# Patient Record
Sex: FEMALE | Race: WHITE | NOT HISPANIC OR LATINO | Employment: OTHER | URBAN - NONMETROPOLITAN AREA
[De-identification: names, ages, dates, MRNs, and addresses within clinical notes are randomized per-mention and may not be internally consistent; named-entity substitution may affect disease eponyms.]

---

## 2017-03-03 ENCOUNTER — COMMUNICATION - GICH (OUTPATIENT)
Dept: FAMILY MEDICINE | Facility: OTHER | Age: 57
End: 2017-03-03

## 2017-03-03 DIAGNOSIS — K21.9 GASTRO-ESOPHAGEAL REFLUX DISEASE WITHOUT ESOPHAGITIS: ICD-10-CM

## 2017-03-04 ENCOUNTER — COMMUNICATION - GICH (OUTPATIENT)
Dept: FAMILY MEDICINE | Facility: OTHER | Age: 57
End: 2017-03-04

## 2017-03-04 DIAGNOSIS — K21.9 GASTRO-ESOPHAGEAL REFLUX DISEASE WITHOUT ESOPHAGITIS: ICD-10-CM

## 2017-03-10 ENCOUNTER — COMMUNICATION - GICH (OUTPATIENT)
Dept: FAMILY MEDICINE | Facility: OTHER | Age: 57
End: 2017-03-10

## 2017-04-09 ENCOUNTER — COMMUNICATION - GICH (OUTPATIENT)
Dept: FAMILY MEDICINE | Facility: OTHER | Age: 57
End: 2017-04-09

## 2017-04-09 DIAGNOSIS — K21.9 GASTRO-ESOPHAGEAL REFLUX DISEASE WITHOUT ESOPHAGITIS: ICD-10-CM

## 2017-04-11 ENCOUNTER — COMMUNICATION - GICH (OUTPATIENT)
Dept: FAMILY MEDICINE | Facility: OTHER | Age: 57
End: 2017-04-11

## 2017-04-12 ENCOUNTER — AMBULATORY - GICH (OUTPATIENT)
Dept: LAB | Facility: OTHER | Age: 57
End: 2017-04-12

## 2017-04-12 DIAGNOSIS — R94.5 ABNORMAL RESULTS OF LIVER FUNCTION STUDIES: ICD-10-CM

## 2017-04-12 LAB
ALT (SGPT) - HISTORICAL: 70 IU/L (ref 7–52)
AST SERPL-CCNC: 49 IU/L (ref 13–39)

## 2017-04-29 ENCOUNTER — COMMUNICATION - GICH (OUTPATIENT)
Dept: FAMILY MEDICINE | Facility: OTHER | Age: 57
End: 2017-04-29

## 2017-04-29 DIAGNOSIS — E78.00 PURE HYPERCHOLESTEROLEMIA: ICD-10-CM

## 2017-07-31 ENCOUNTER — COMMUNICATION - GICH (OUTPATIENT)
Dept: FAMILY MEDICINE | Facility: OTHER | Age: 57
End: 2017-07-31

## 2017-07-31 DIAGNOSIS — E78.00 PURE HYPERCHOLESTEROLEMIA: ICD-10-CM

## 2017-10-01 ENCOUNTER — COMMUNICATION - GICH (OUTPATIENT)
Dept: FAMILY MEDICINE | Facility: OTHER | Age: 57
End: 2017-10-01

## 2017-10-01 DIAGNOSIS — K21.9 GASTRO-ESOPHAGEAL REFLUX DISEASE WITHOUT ESOPHAGITIS: ICD-10-CM

## 2017-10-04 ENCOUNTER — COMMUNICATION - GICH (OUTPATIENT)
Dept: FAMILY MEDICINE | Facility: OTHER | Age: 57
End: 2017-10-04

## 2017-10-04 DIAGNOSIS — I10 ESSENTIAL (PRIMARY) HYPERTENSION: ICD-10-CM

## 2017-10-10 ENCOUNTER — HISTORY (OUTPATIENT)
Dept: FAMILY MEDICINE | Facility: OTHER | Age: 57
End: 2017-10-10

## 2017-10-10 ENCOUNTER — COMMUNICATION - GICH (OUTPATIENT)
Dept: FAMILY MEDICINE | Facility: OTHER | Age: 57
End: 2017-10-10

## 2017-10-10 ENCOUNTER — OFFICE VISIT - GICH (OUTPATIENT)
Dept: FAMILY MEDICINE | Facility: OTHER | Age: 57
End: 2017-10-10

## 2017-10-10 DIAGNOSIS — R79.89 OTHER SPECIFIED ABNORMAL FINDINGS OF BLOOD CHEMISTRY: ICD-10-CM

## 2017-10-10 DIAGNOSIS — L92.0 GRANULOMA ANNULARE: ICD-10-CM

## 2017-10-10 DIAGNOSIS — J45.901 ASTHMA WITH ACUTE EXACERBATION: ICD-10-CM

## 2017-10-10 DIAGNOSIS — E78.00 PURE HYPERCHOLESTEROLEMIA: ICD-10-CM

## 2017-10-10 DIAGNOSIS — J45.20 MILD INTERMITTENT ASTHMA, UNCOMPLICATED: ICD-10-CM

## 2017-10-10 DIAGNOSIS — R79.0 ABNORMAL LEVEL OF BLOOD MINERAL: ICD-10-CM

## 2017-10-10 DIAGNOSIS — Z28.21 IMMUNIZATION NOT CARRIED OUT BECAUSE OF PATIENT REFUSAL: ICD-10-CM

## 2017-10-10 DIAGNOSIS — K21.9 GASTRO-ESOPHAGEAL REFLUX DISEASE WITHOUT ESOPHAGITIS: ICD-10-CM

## 2017-10-10 DIAGNOSIS — I10 ESSENTIAL (PRIMARY) HYPERTENSION: ICD-10-CM

## 2017-10-10 LAB
A/G RATIO - HISTORICAL: 2 (ref 1–2)
ALBUMIN SERPL-MCNC: 4.6 G/DL (ref 3.5–5.7)
ALP SERPL-CCNC: 97 IU/L (ref 34–104)
ALT (SGPT) - HISTORICAL: 83 IU/L (ref 7–52)
ANION GAP - HISTORICAL: 8 (ref 5–18)
AST SERPL-CCNC: 54 IU/L (ref 13–39)
BILIRUB SERPL-MCNC: 1 MG/DL (ref 0.3–1)
BUN SERPL-MCNC: 16 MG/DL (ref 7–25)
BUN/CREAT RATIO - HISTORICAL: 24
CALCIUM SERPL-MCNC: 9.7 MG/DL (ref 8.6–10.3)
CHLORIDE SERPLBLD-SCNC: 105 MMOL/L (ref 98–107)
CHOL/HDL RATIO - HISTORICAL: 2.96
CHOLESTEROL TOTAL: 201 MG/DL
CO2 SERPL-SCNC: 23 MMOL/L (ref 21–31)
CREAT SERPL-MCNC: 0.68 MG/DL (ref 0.7–1.3)
GFR IF NOT AFRICAN AMERICAN - HISTORICAL: >60 ML/MIN/1.73M2
GLOBULIN - HISTORICAL: 2.3 G/DL (ref 2–3.7)
GLUCOSE SERPL-MCNC: 104 MG/DL (ref 70–105)
HDLC SERPL-MCNC: 68 MG/DL (ref 23–92)
LDLC SERPL CALC-MCNC: 110 MG/DL
MAGNESIUM SERPL-MCNC: 1.8 MG/DL (ref 1.9–2.7)
NON-HDL CHOLESTEROL - HISTORICAL: 133 MG/DL
POTASSIUM SERPL-SCNC: 4 MMOL/L (ref 3.5–5.1)
PROT SERPL-MCNC: 6.9 G/DL (ref 6.4–8.9)
PROVIDER ORDERDED STATUS - HISTORICAL: ABNORMAL
SODIUM SERPL-SCNC: 136 MMOL/L (ref 133–143)
TRIGL SERPL-MCNC: 117 MG/DL

## 2017-10-17 ENCOUNTER — OFFICE VISIT - GICH (OUTPATIENT)
Dept: OTOLARYNGOLOGY | Facility: OTHER | Age: 57
End: 2017-10-17

## 2017-10-17 DIAGNOSIS — Z00.00 ENCOUNTER FOR GENERAL ADULT MEDICAL EXAMINATION WITHOUT ABNORMAL FINDINGS: ICD-10-CM

## 2017-11-02 ENCOUNTER — COMMUNICATION - GICH (OUTPATIENT)
Dept: FAMILY MEDICINE | Facility: OTHER | Age: 57
End: 2017-11-02

## 2017-11-02 DIAGNOSIS — R79.0 ABNORMAL LEVEL OF BLOOD MINERAL: ICD-10-CM

## 2017-12-27 NOTE — PROGRESS NOTES
Patient Information     Patient Name MRN Charleen Majano [id number] Female 1960      Progress Notes by Marian Loza at 10/17/2017  1:00 PM     Author:  Marian Loza Service:  (none) Author Type:  (none)     Filed:  10/24/2017  2:28 PM Encounter Date:  10/17/2017 Status:  Signed     :  Marian Loza            See scanned document.

## 2017-12-28 NOTE — TELEPHONE ENCOUNTER
Patient Information     Patient Name MRN Sex Charleen Arora [id number] Female 1960      Telephone Encounter by Marilin Velásquez RN at 2017  1:07 PM     Author:  Marilin Velásquez RN Service:  (none) Author Type:  NURS- Registered Nurse     Filed:  2017  1:15 PM Encounter Date:  2017 Status:  Signed     :  Marilin Velásquez RN (NURS- Registered Nurse)            This is a Refill request [facility]: [facility]   Name of Medication: Magnesium Oxide  Quantity requested: 90  Last fill date: 10/10/2017  Due for refill: yes  Last visit with TAYLER SEO was on: 10/10/2017 in [facility]  PCP:  Tayler Seo MD  Controlled Substance Agreement:  n/a   Diagnosis r/t this medication request: low magnesium level     MAGNESIUM      Date Value Ref Range Status   10/10/2017 1.8 (L) 1.9 - 2.7 mg/dL Final       Unable to complete prescription refill per RN Medication Refill Policy.................... Marilin Velásquez RN ....................  2017   1:07 PM

## 2017-12-28 NOTE — TELEPHONE ENCOUNTER
Patient Information     Patient Name MRN Charleen Majano [id number] Female 1960      Telephone Encounter by Elisa Perez RN at 2017  2:10 PM     Author:  Elisa Perez RN Service:  (none) Author Type:  NURS- Registered Nurse     Filed:  2017  2:16 PM Encounter Date:  2017 Status:  Signed     :  Elisa Perez RN (NURS- Registered Nurse)            Statins    Office visit in the past 12 months.    Last visit with TAYLER PAZ was on: 2016 in [facility]  Next visit with TAYLER PAZ is on: No future appointment listed with this provider  Next visit with Family Practice is on: No future appointment listed in this department    Lab testing requirements:  Lipids annually.  Repeat lipids 6-8 weeks after dosage or drug change.    Last Lipids:  Chol: 194    2016  T    2016  HDL:   64    2016  LDL:  112    2016  LDL DIRECT:  No results found in past 5 years    .    Concommitant use of fibrates and statins-If it is an addition to the medication list, review note and/or discuss with provider.  If already on medication list, refill.    Max refills 12 months from last office visit.      Patient is due for medication management appointment. Limited refill provided at this time. [facility] message and/or letter sent for reminder to patient. Prescription refilled per RN Medication Refill Policy.................... Elisa Perez RN ....................  2017   2:15 PM

## 2017-12-28 NOTE — PATIENT INSTRUCTIONS
Patient Information     Patient Name MRN Sex Charleen Arora [id number] Female 1960      Patient Instructions by Evelina Seo MD at 10/10/2017 11:25 AM     Author:  Evelina Seo MD  Service:  (none) Author Type:  Physician     Filed:  10/10/2017  6:21 PM  Encounter Date:  10/10/2017 Status:  Addendum     :  Evelina Seo MD (Physician)        Related Notes: Original Note by Evelina Seo MD (Physician) filed at 10/10/2017  6:20 PM            Car Safety tips:   Wear your seatbelt at all times.   Do not drive when tired.  If drinking alcohol, find a designated .  Do NOT EVER text and drive!   Consider DRIVE MODE carolyn for your phone.     Patient refused mammogram    Labs will be placed in your [employer] account  Work on following  a mediterranean diet; Use monosaturated fats ( olive , canola and peanut   oil; nuts, avocados), abundance of plant foods which are minimally processed, fish (salmon).  Exercise 30 minutes every day     Try to get 7-8 hours sleep each night.  Rest is important!    lipitor ; hold for 2 weeks   Referral placed to Dr. Bowman for hepatic steatosis and use of statin     Reviewed with patient problems with long term use of PPI including but not limited to change of microbia of gut sadaf due to acid changes, interstitial nephritis, decrease calcium and magnesium absorption.   Black box warning for individuals with Osteoporosis;  Omeprazole not refilled.  Trial zantac 300 mg a day

## 2017-12-28 NOTE — TELEPHONE ENCOUNTER
Patient Information     Patient Name MRN Charleen Majano [id number] Female 1960      Telephone Encounter by Elisa Perez RN at 10/3/2017  4:08 PM     Author:  Elisa Perez RN Service:  (none) Author Type:  NURS- Registered Nurse     Filed:  10/3/2017  4:19 PM Encounter Date:  10/1/2017 Status:  Signed     :  Elisa Perez RN (NURS- Registered Nurse)            Proton Pump Inhibitors    Office visit in the past 12 months or per provider note.    Last visit with TAYLER PAZ was on: 2016 in [facility]  Next visit with TAYLER PAZ is on: 10/10/2017 in [facility]  Next visit with Family Practice is on: 10/10/2017 in [facility]    Max refill for 12 months from last office visit or per provider note.    Patient is due for medication management and lab appointment. Limited refill provided at this time. [employer] message and/or letter sent for reminder to patient. Prescription refilled per RN Medication Refill Policy.................... Elisa Perez RN ....................  10/3/2017   4:15 PM

## 2017-12-28 NOTE — PROGRESS NOTES
Patient Information     Patient Name MRN Sex Charleen Arora [id number] Female 1960      Progress Notes by Evelina Seo MD at 10/10/2017 10:15 AM     Author:  Evelina Seo MD Service:  (none) Author Type:  Physician     Filed:  10/10/2017  6:24 PM Encounter Date:  10/10/2017 Status:  Signed     :  Evelina Seo MD (Physician)            Nursing Notes:   Yanet Virgen  10/10/2017 10:40 AM  Signed  Patient presents to the clinic to get her medications refilled.  She knows she's due for a mammogram and PAP but declines at this time, they don't have good insurance so she would have to pay a large amount out of pocket, she will have them done as soon as things turn around for her.    She declines a flu shot at this time as well.    Yanet Virgen LPN....................10/10/2017 10:39 AM        Subjective:  Charleen Huddleston is a 57 y.o. female who presents fo medication refill     High cholesterol   patient is here for follow-up of her cholesterol. She is on Lipitor 20 mg a day. She has history of elevated liver enzyme. Denies any chest pain or palpitations         HTN (hypertension)   Patient appears well, alert and oriented x 3, pleasant, cooperative.  MALLORIE. TM's clear, Oral pharynx with good dentition, without lesion, erythema or exudate. Moist mucous membranes. Neck supple and free of adenopathy, or masses. No thyromegaly. Lungs are clear, without wheezes, rhonchi or rales. Heart sounds are normal, no murmurs, clicks, gallops or rubs. Abdomen is soft, no tenderness, masses or organomegaly. No CVAT.  Extremities are without edema. Peripheral pulses are normal. Screening neurological exam is normal without focal findings. Skin is normal without suspicious lesions noted.    Elevated liver enzymes  patient has had elevated liver enzymes which prompted ultrasound last year which suggested hepatic steatosis as noted below.    COMPARISON: None.       FINDINGS:     LIVER: There is increased echogenicity to the liver parenchyma, suggesting hepatic steatosis. There is no biliary ductal dilatation.      GALLBLADDER: The gallbladder is unremarkable, without cholelithiasis, wall thickening or pericholecystic fluid.      COMMON BILE DUCT: 2 mm     PANCREAS: The visualized portions of the pancreas are unremarkable.     KIDNEYS: The right kidney is normal in echotexture without hydronephrosis.     ABDOMINAL AORTA AND IVC: Visualized portions of the IVC and abdominal aorta are unremarkable. Note that the distal abdominal aorta was obscured by bowel gas.        IMPRESSION: Hepatic steatosis.     Electronically Signed By: Dorothea Jones M.D. on 10/20/2016 10:19 AM          Low magnesium level     patient has had a history of low magnesium on a proton pump inhibitor. Denies any muscle aches or cramping     Gastroesophageal reflux disease without esophagitis   patient with a history of reflux disease she is on a proton pump inhibitor would like refill. While on PPI she is not having symptoms     Reactive airway disease with acute exacerbation, unspecified asthma severity, unspecified whether persistent  patient needs refill of her Advair and her Ventolin;  She refuses flu shot        Problem List/PMH: reviewed in EMR  No Known Allergies  Current Outpatient Prescriptions on File Prior to Visit       Medication  Sig Dispense Refill     atorvastatin (LIPITOR) 20 mg tablet TAKE ONE TABLET BY MOUTH NIGHTLY AT BEDTIME 90 tablet 0     omeprazole (PRILOSEC) 20 mg Delayed-Release capsule TAKE 1 CAPSULE BY MOUTH ONCE DAILY BEFORE A MEAL. 90 capsule 0     No current facility-administered medications on file prior to visit.        Social Hx:  Social History        Substance Use Topics          Smoking status:   Current Every Day Smoker      Packs/day:  1.00      Years:  25.00      Types:  Cigarettes      Last attempt to quit:  10/1/2012      Smokeless tobacco:   Never Used        "Comment: requesting chantex       Alcohol use   1.8 oz/week     3 Standard drinks or equivalent per week       Social History Narrative    .  Works at the school [employer].   works at [email or web address].      She has 3 grown children.    L[place] on [address]. Quit smoking 9/2012        **pre upload 02/11/2013**                                Family Hx:   Family History       Problem   Relation Age of Onset     Hyperlipidemia  Mother      Hyperlipidemia       Stroke  Mother      CVA 03/07       Heart Disease  Mother      aortic and mitral valve replacements, rheumatic fever       Diabetes  Father      Heart Disease  Sister      Valvular heart disease and diffuse athersclerosis on brain MRI       Other  Sister      rheumatic fever       Stroke  Maternal Grandmother      CVA       Cancer-prostate  Paternal Grandfather        Objective:  /84  Ht 1.575 m (5' 2\")  Wt 85 kg (187 lb 6.4 oz)  Breastfeeding? No  BMI 34.28 kg/m2   Patient appears well, alert and oriented x 3, pleasant, cooperative.  MALLORIE. TM's clear, Oral pharynx with good dentition, without lesion, erythema or exudate. Moist mucous membranes. Neck supple and free of adenopathy, or masses. No thyromegaly. Lungs are clear, without wheezes, rhonchi or rales. Heart sounds are normal, no murmurs, clicks, gallops or rubs. Abdomen is soft, obese, no tenderness, masses or organomegaly. No CVAT.  Extremities are without edema. Peripheral pulses are normal. Screening neurological exam is normal without focal findings. Skin is normal without suspicious lesions noted.        Results for orders placed or performed in visit on 10/10/17      LIPID PANEL      Result  Value Ref Range    CHOLESTEROL,TOTAL 201 (H) <200 mg/dL    TRIGLYCERIDES 117 <150 mg/dL    HDL CHOLESTEROL 68 23 - 92 mg/dL    NON-HDL CHOLESTEROL 133 <145 mg/dl    CHOL/HDL RATIO            2.96 <4.50                    LDL CHOLESTEROL 110 (H) <100 mg/dL    PROVIDER ORDERED STATUS RANDOM    COMP " METABOLIC PANEL      Result  Value Ref Range    SODIUM 136 133 - 143 mmol/L    POTASSIUM 4.0 3.5 - 5.1 mmol/L    CHLORIDE 105 98 - 107 mmol/L    CO2,TOTAL 23 21 - 31 mmol/L    ANION GAP 8 5 - 18                    GLUCOSE 104 70 - 105 mg/dL    CALCIUM 9.7 8.6 - 10.3 mg/dL    BUN 16 7 - 25 mg/dL    CREATININE 0.68 (L) 0.70 - 1.30 mg/dL    BUN/CREAT RATIO           24                    GFR if African American >60 >60 ml/min/1.73m2    GFR if not African American >60 >60 ml/min/1.73m2    ALBUMIN 4.6 3.5 - 5.7 g/dL    PROTEIN,TOTAL 6.9 6.4 - 8.9 g/dL    GLOBULIN                  2.3 2.0 - 3.7 g/dL    A/G RATIO 2.0 1.0 - 2.0                    BILIRUBIN,TOTAL 1.0 0.3 - 1.0 mg/dL    ALK PHOSPHATASE 97 34 - 104 IU/L    ALT (SGPT) 83 (H) 7 - 52 IU/L    AST (SGOT) 54 (H) 13 - 39 IU/L   MAGNESIUM      Result  Value Ref Range    MAGNESIUM 1.8 (L) 1.9 - 2.7 mg/dL         Assessment:    ICD-10-CM    1. High cholesterol E78.00 LIPID PANEL      COMP METABOLIC PANEL      LIPID PANEL      COMP METABOLIC PANEL   2. HTN (hypertension) I10 COMP METABOLIC PANEL      lisinopril-hydrochlorothiazide (10-12.5 mg) tablet (PRINZIDE; ZESTORETIC)      COMP METABOLIC PANEL      TSH   3. Elevated LFTs R79.89 AMB CONSULT TO INTERNAL MEDICINE   4. Low magnesium level R79.0 MAGNESIUM      MAGNESIUM      magnesium oxide 400 mg capsule   5. Gastroesophageal reflux disease without esophagitis K21.9 ranitidine (ZANTAC) 300 mg tablet   6. Reactive airway disease with acute exacerbation, unspecified asthma severity, unspecified whether persistent J45.901 fluticasone-salmeterol (ADVAIR DISKUS) 250-50 mcg/Dose diskus inhaler   7. Reactive airway disease, mild intermittent, uncomplicated J45.20 albuterol HFA (VENTOLIN HFA) 90 mcg/actuation inhaler   8. Granuloma annulare L92.0 TSH   9. Refused influenza vaccine Z28.21         Ms. Huddleston's Body mass index is 34.28 kg/(m^2). This is out of the normal range for a 57 y.o. Normal range for ages 18+ is between  18.5 and 24.9. To lose weight we reviewed risks and benefits of appropriate options such as diet, exercise, and medications. Patient's strategy will be  self-directed nutrition plan and self-directed exercise program  Referral to Dr. Bowman for persistent LFT/ hepatic steatosis  Encouraged flu shot, patient declined.  She declined breast/ pelvic exam.   Plan:   -- Expected clinical course discussed   -- Medications and their side effects discussed  Patient Instructions   Car Safety tips:   Wear your seatbelt at all times.   Do not drive when tired.  If drinking alcohol, find a designated .  Do NOTEVER text and drive!   Consider DRIVE MODE carolyn for your phone.     Patient refused mammogram    Labs will be placed in your [employer] account  Work on following  a mediterranean diet; Use monosaturated fats ( olive , canola and peanut   oil; nuts, avocados), abundance of plant foods which are minimally processed, fish (salmon).  Exercise 30 minutes every day     Try to get 7-8 hours sleep each night.  Rest is important!    lipitor ; hold for 2 weeks   Referral placed to Dr. Bowman for hepatic steatosis and use of statin     Reviewed with patient problems with long term use of PPI including but not limited to change of microbia of gut sadaf due to acid changes, interstitial nephritis, decrease calcium and magnesium absorption.   Black box warning for individuals with Osteoporosis;  Omeprazole not refilled.  Trial zantac 300 mg a day            Electronically signed by Evelina Seo MD

## 2017-12-28 NOTE — TELEPHONE ENCOUNTER
Patient Information     Patient Name MRN Charleen Majano [id number] Female 1960      Telephone Encounter by Elisa Perez RN at 10/5/2017  4:36 PM     Author:  Elisa Perez RN Service:  (none) Author Type:  NURS- Registered Nurse     Filed:  10/5/2017  4:37 PM Encounter Date:  10/4/2017 Status:  Signed     :  Elisa Perez RN (NURS- Registered Nurse)            Diuretic Combinations    Office visit in the past 12 months or per provider note.    Last visit with TAYLER PAZ was on: 2016 in [facility]  Next visit with TAYLER PAZ is on: 10/10/2017 in [facility]  Next visit with Family Practice is on: 10/10/2017 in [facility]    Lab test requirements:  Creatinine and Potassium annually, if ordering lab, order BMP.  CREATININE (mg/dL)    Date Value   2016 0.75     POTASSIUM (mmol/L)    Date Value   2016 4.3       Max refill for 12 months from last office visit or per provider note.  BP Readings from Last 4 Encounters:    16 138/80   16 138/76   16 138/80   08/20/15 140/78         Prescription refilled per RN Medication Refill Policy.................... Elisa Perez RN ....................  10/5/2017   4:36 PM

## 2017-12-28 NOTE — TELEPHONE ENCOUNTER
Patient Information     Patient Name MRN Charleen Majano [id number] Female 1960      Telephone Encounter by Elisa Perez RN at 10/11/2017  4:09 PM     Author:  Elisa Perez RN Service:  (none) Author Type:  NURS- Registered Nurse     Filed:  10/11/2017  4:11 PM Encounter Date:  10/10/2017 Status:  Signed     :  Elisa Perez RN (NURS- Registered Nurse)            Statins    Office visit in the past 12 months.    Last visit with TAYLER PAZ was on: 10/10/2017 in [facility]  Next visit with TAYLER PAZ is on: No future appointment listed with this provider  Next visit with Family Practice is on: No future appointment listed in this department    Lab testing requirements:  Lipids annually.  Repeat lipids 6-8 weeks after dosage or drug change.    Last Lipids:  Chol: 201    10/10/2017  T    10/10/2017  HDL:   68    10/10/2017  LDL:  110    10/10/2017  LDL DIRECT:  No results found in past 5 years    .    Concommitant use of fibrates and statins-If it is an addition to the medication list, review note and/or discuss with provider.  If already on medication list, refill.    Max refills 12 months from last office visit.      Prescription refilled per RN Medication Refill Policy.................... Elias Perez RN ....................  10/11/2017   4:09 PM

## 2017-12-30 NOTE — NURSING NOTE
Patient Information     Patient Name MRCharleen Dahl [id number] Female 1960      Nursing Note by Yanet Virgen at 10/10/2017 10:15 AM     Author:  Yanet Virgen Service:  (none) Author Type:  (none)     Filed:  10/10/2017 10:40 AM Encounter Date:  10/10/2017 Status:  Signed     :  Yanet Virgen            Patient presents to the clinic to get her medications refilled.  She knows she's due for a mammogram and PAP but declines at this time, they don't have good insurance so she would have to pay a large amount out of pocket, she will have them done as soon as things turn around for her.    She declines a flu shot at this time as well.    Yanet Virgen LPN....................10/10/2017 10:39 AM

## 2018-01-03 NOTE — TELEPHONE ENCOUNTER
Patient Information     Patient Name MRN Charleen Majano [id number] Female 1960      Telephone Encounter by Princess Gallo at 3/10/2017  4:04 PM     Author:  Princess Gallo Service:  (none) Author Type:  (none)     Filed:  3/10/2017  4:12 PM Encounter Date:  3/10/2017 Status:  Signed     :  Princess Gallo            Patient was calling in regards to her omeprazole. Explained she only received a 30 day supply due to needing to be seen. She states she will not come in until august.  Princess Gallo LPN.......................... 3/10/2017  4:12 PM

## 2018-01-03 NOTE — TELEPHONE ENCOUNTER
Patient Information     Patient Name MRN Charleen Majano [id number] Female 1960      Telephone Encounter by Angelika Desai RN at 3/3/2017  8:36 AM     Author:  Angelika Desai RN Service:  (none) Author Type:  (none)     Filed:  3/3/2017  8:43 AM Encounter Date:  3/3/2017 Status:  Signed     :  Angelika Desai RN (NURS- Registered Nurse)            Proton Pump Inhibitors    Office visit in the past 12 months or per provider note.    Last visit with TAYLER PAZ was on: 2016 in [facility]  Next visit with TAYLER PAZ is on: No future appointment listed with this provider  Next visit with Family Practice is on: No future appointment listed in this department    Max refill for 12 months from last office visit or per provider note.    Patient is due for medication management appointment. Limited refill provided at this time and letter sent for reminder to patient. Prescription refilled per RN Medication Refill Policy.................... Angelika Desai ....................  3/3/2017   8:43 AM

## 2018-01-03 NOTE — TELEPHONE ENCOUNTER
Patient Information     Patient Name MRN Charleen Majano [id number] Female 1960      Telephone Encounter by Angelika Desai RN at 3/6/2017 10:18 AM     Author:  Angelika Desai RN Service:  (none) Author Type:  (none)     Filed:  3/6/2017 10:20 AM Encounter Date:  3/4/2017 Status:  Signed     :  Angelika Desai RN (NURS- Registered Nurse)            Proton Pump Inhibitors    Office visit in the past 12 months or per provider note.    Last visit with TAYLER PAZ was on: 2016 in [facility]  Next visit with TAYLER PAZ is on: No future appointment listed with this provider  Next visit with Family Practice is on: No future appointment listed in this department    Max refill for 12 months from last office visit or per provider note.    Prescription was filled for a limited quantity and patient advised to be seen for further refills on 3/3/2017.    Unable to complete prescription refill per RN Medication Refill Policy.................... Angelika Desai ....................  3/6/2017   10:20 AM

## 2018-01-04 NOTE — TELEPHONE ENCOUNTER
Patient Information     Patient Name MRN Charleen Majano [id number] Female 1960      Telephone Encounter by Kelli Bahena at 2017  8:46 AM     Author:  Kelli Bahena Service:  (none) Author Type:  (none)     Filed:  2017  8:53 AM Encounter Date:  2017 Status:  Signed     :  Kelli Bahena            FYI:     Spoke with patient and she was told by our Triage/ RN nurse that she only needed to have a lab only to have her Liver Enzymes rechecked.    Current orders have . I did extend these if this is a problem just let me know.      Note from RN on previous Phone note.     Marilin Velásquez, RN       4:03 PM   Note      Patient returned call. She agreed to make a lab only appointment and was transferred to scheduling.  Marilin Velásquez RN........4/10/2017 4:03 PM           Kelli Bahena LPN........................2017  8:50 AM

## 2018-01-04 NOTE — TELEPHONE ENCOUNTER
Patient Information     Patient Name MRN Charleen Majano [id number] Female 1960      Telephone Encounter by Marilin Velásquez RN at 2017 10:18 AM     Author:  Marilin Velásquez RN Service:  (none) Author Type:  NURS- Registered Nurse     Filed:  2017 10:19 AM Encounter Date:  2017 Status:  Signed     :  Marilin Velásquez RN (NURS- Registered Nurse)            Statins    Office visit in the past 12 months.    Last visit with TAYLER PAZ was on: 2016 in [facility]  Next visit with TAYLER PAZ is on: No future appointment listed with this provider  Next visit with Family Practice is on: No future appointment listed in this department    Lab testing requirements:  Lipids annually.  Repeat lipids 6-8 weeks after dosage or drug change.    Last Lipids:  Chol: 194    2016  T    2016  HDL:   64    2016  LDL:  112    2016  LDL DIRECT:  No results found in past 5 years    .    Concommitant use of fibrates and statins-If it is an addition to the medication list, review note and/or discuss with provider.  If already on medication list, refill.    Max refills 12 months from last office visit.      Due for medication management in August. Filled until that time.    Marilin Velásquez RN........2017 10:19 AM

## 2018-01-04 NOTE — TELEPHONE ENCOUNTER
Patient Information     Patient Name MRN Charleen Majano [id number] Female 1960      Telephone Encounter by Marilin Velásquez RN at 4/10/2017  4:03 PM     Author:  Marilin Velásquez RN Service:  (none) Author Type:  NURS- Registered Nurse     Filed:  4/10/2017  4:03 PM Encounter Date:  2017 Status:  Signed     :  Marilin Velásquez RN (NURS- Registered Nurse)            Patient returned call. She agreed to make a lab only appointment and was transferred to scheduling.  Marilin Velásquez RN........4/10/2017 4:03 PM

## 2018-01-04 NOTE — TELEPHONE ENCOUNTER
Patient Information     Patient Name MRN Charleen Majano [id number] Female 1960      Telephone Encounter by Angelika Desai RN at 4/10/2017 10:39 AM     Author:  Angelika Desai RN Service:  (none) Author Type:  (none)     Filed:  4/10/2017 10:44 AM Encounter Date:  2017 Status:  Signed     :  Angelika Desai RN (NURS- Registered Nurse)            Proton Pump Inhibitors    Office visit in the past 12 months or per provider note.    [facility]  Next visit with TAYLER PAZ is on: No future appointment listed with this provider  Next visit with Family Practice is on: No future appointment listed in this department    Max refill for 12 months from last office visit or per provider note.    Patient is due for labs per last office visit (elevated LFTs). Refills will be provided as the medication is not related to these labs.    Attempted to reach patient, unable. Left message to call back.    Prescription refilled per RN Medication Refill Policy.................... Angelika Desai ....................  4/10/2017   10:43 AM

## 2018-01-04 NOTE — TELEPHONE ENCOUNTER
Patient Information     Patient Name MRN Sex Charleen Arora [id number] Female 1960      Telephone Encounter by Eboni Chino at 2017  7:29 AM     Author:  Eboni Chino Service:  (none) Author Type:  (none)     Filed:  2017  7:30 AM Encounter Date:  2017 Status:  Signed     :  Eboni Chino            Pt coming  @ [phone] for lab work. Please place orders. Thanks lab

## 2018-01-24 ENCOUNTER — DOCUMENTATION ONLY (OUTPATIENT)
Dept: FAMILY MEDICINE | Facility: OTHER | Age: 58
End: 2018-01-24

## 2018-01-24 PROBLEM — E66.9 OBESITY: Status: ACTIVE | Noted: 2018-01-24

## 2018-01-24 RX ORDER — ALBUTEROL SULFATE 90 UG/1
1-2 AEROSOL, METERED RESPIRATORY (INHALATION) EVERY 4 HOURS PRN
COMMUNITY
Start: 2017-10-10 | End: 2018-08-24

## 2018-01-24 RX ORDER — CALCIUM CARBONATE/VITAMIN D3 500-10/5ML
400 LIQUID (ML) ORAL DAILY
COMMUNITY
Start: 2017-11-02 | End: 2018-08-24

## 2018-01-24 RX ORDER — ATORVASTATIN CALCIUM 20 MG/1
20 TABLET, FILM COATED ORAL AT BEDTIME
COMMUNITY
Start: 2017-10-11 | End: 2018-08-24

## 2018-01-24 RX ORDER — LISINOPRIL/HYDROCHLOROTHIAZIDE 10-12.5 MG
1 TABLET ORAL DAILY
COMMUNITY
Start: 2017-10-10 | End: 2018-08-24

## 2018-01-26 VITALS
SYSTOLIC BLOOD PRESSURE: 128 MMHG | WEIGHT: 187.4 LBS | BODY MASS INDEX: 34.48 KG/M2 | DIASTOLIC BLOOD PRESSURE: 84 MMHG | HEIGHT: 62 IN

## 2018-02-02 ENCOUNTER — COMMUNICATION - GICH (OUTPATIENT)
Dept: FAMILY MEDICINE | Facility: OTHER | Age: 58
End: 2018-02-02

## 2018-02-02 DIAGNOSIS — R79.0 ABNORMAL LEVEL OF BLOOD MINERAL: ICD-10-CM

## 2018-02-03 ENCOUNTER — COMMUNICATION - GICH (OUTPATIENT)
Dept: FAMILY MEDICINE | Facility: OTHER | Age: 58
End: 2018-02-03

## 2018-02-03 DIAGNOSIS — K21.9 GASTRO-ESOPHAGEAL REFLUX DISEASE WITHOUT ESOPHAGITIS: ICD-10-CM

## 2018-02-13 NOTE — TELEPHONE ENCOUNTER
Patient Information     Patient Name MRN Charleen Majano [id number] Female 1960      Telephone Encounter by Isabel Cervantes RN at 2018 11:00 AM     Author:  Isabel Cervantes RN Service:  (none) Author Type:  NURS- Registered Nurse     Filed:  2018 11:02 AM Encounter Date:  2018 Status:  Signed     :  Isabel Cervantes RN (NURS- Registered Nurse)            [facility]  Name of Medication: magnesium oxide (MAG-) 400 mg tablet  TAKE 1 TABLET BY MOUTH ONCE DAILY.  Quantity requested: 90  Last fill date: 17  Due for refill: yes  Last visit with EVELINA SEO was on: 10/10/2017 in [facility]  PCP:  Evelina Seo MD  Controlled Substance Agreement:  na   Diagnosis r/t this medication request: low magnesium  Last med review appt was 10/10/17, refill appropriate, Mg was 1.8 on 10/10/17     Unable to complete prescription refill per RN Medication Refill Policy.................... ISABEL CERVANTES RN ....................  2018   11:00 AM

## 2018-02-13 NOTE — TELEPHONE ENCOUNTER
Patient Information     Patient Name MRN Charleen Majano [id number] Female 1960      Telephone Encounter by Isabel Cervantes RN at 2018 12:34 PM     Author:  Isabel Cervantes RN Service:  (none) Author Type:  NURS- Registered Nurse     Filed:  2018 12:36 PM Encounter Date:  2/3/2018 Status:  Signed     :  Isabel Cervantes RN (NURS- Registered Nurse)            This is a [facility]  Name of Medication: omeprazole (PRILOSEC) 20 mg Delayed-Release capsule  TAKE 1 CAPSULE BY MOUTH ONCE DAILY BEFORE A MEAL.  Quantity requested: 90  Last fill date: 10/3/17  Due for refill: unsure  Last visit with EVELINA SEO was on: 10/10/2017 in [facility]  PCP:  Evelina Seo MD  Controlled Substance Agreement:  na   Diagnosis r/t this medication request: GERD    Patient was seen for med review on 10/10/17 and Prilosec was not refilled and was given #90 day trial of Zantac instead to Evelina Seo MD to address refill     Unable to complete prescription refill per RN Medication Refill Policy.................... ISABEL CERVANTES RN ....................  2018   12:34 PM

## 2018-03-25 ENCOUNTER — HEALTH MAINTENANCE LETTER (OUTPATIENT)
Age: 58
End: 2018-03-25

## 2018-05-07 DIAGNOSIS — K21.9 GASTROESOPHAGEAL REFLUX DISEASE WITHOUT ESOPHAGITIS: Primary | ICD-10-CM

## 2018-05-10 NOTE — TELEPHONE ENCOUNTER
Prescription approved per [facility] Refill Protocol.  Elisa Perez RN.............................5/10/2018 2:59 PM

## 2018-05-11 DIAGNOSIS — K21.9 GASTROESOPHAGEAL REFLUX DISEASE WITHOUT ESOPHAGITIS: ICD-10-CM

## 2018-05-14 NOTE — TELEPHONE ENCOUNTER
Transmission failed on 5/10/18. Will resend. Prescription refilled per RN Medication RefillPolicy.................... Yuki Callejas ....................  5/14/2018   2:04 PM      omeprazole (PRILOSEC) 20 MG CR capsule 90 capsule 0 5/10/2018  No   Sig: TAKE 1 CAPSULE BY MOUTH ONCE DAILY BEFORE A MEAL.   Class: E-Prescribe   Order: [id number]   E-Prescribing Status: Transmission to pharmacy failed (5/10/2018  2:58 PM CDT)   Printout Tracking   External Result Report   Pharmacy   [facility] [id number] [facility] - [place], [place] - [address]

## 2018-07-23 NOTE — PROGRESS NOTES
Patient Information     Patient Name  Charleen Huddleston MRN  [id number] Sex  Female   1960      Letter by Evelina Seo MD at      Author:  Evelina Seo MD Service:  (none) Author Type:  (none)    Filed:   Encounter Date:  3/3/2017 Status:  (Other)           Charleen Huddleston  [address] [facility] [address]          March 3, 2017    Dear Ms. Huddleston:    A LIMITED refill of omeprazole (PRILOSEC) 20 mg Delayed-Release capsule has been called into your pharmacy.    Additional refills require a medication management and follow up lab appointment with Evelina Seo MD. Please call the clinic at [phone] to schedule your appointment.    Thank you,    The Refill Nurse  [facility]

## 2018-07-23 NOTE — PROGRESS NOTES
Patient Information     Patient Name  Charleen Huddleston MRN  [id number] Sex  Female   1960      Letter by Evelina Seo MD at      Author:  Evelina Seo MD Service:  (none) Author Type:  (none)    Filed:   Encounter Date:  2017 Status:  (Other)           Charleen Huddleston  [address] [facility] [address]          2017    Dear Ms. Huddleston:    This letter is to remind you that you are due for your annual exam with Evelina Seo MD. Your last comprehensive visit was more than 12 months ago.    A LIMITED refill of   Orders Placed This Encounter      atorvastatin (LIPITOR) 20 mg tablet has been called into your pharmacy. Additional refills require you to complete this appointment.    Please call the clinic at [phone] to schedule your appointment.    If you should require additional refills before your scheduled appointment, please contact your pharmacy and we will refill your medication until the date of your appointment.    If you are no longer seeing Evelina Seo MD for primary care, please call to let us know. Doing so will remove you from our call/contact list.      Thank you for choosing [facility] and [facility] for your health care needs.    Sincerely,    Refill RN  [facility]

## 2018-07-24 NOTE — PROGRESS NOTES
Patient Information     Patient Name  Charleen Huddleston MRN  [id number] Sex  Female   1960      Letter by Evelina Seo MD at      Author:  Evelina Seo MD Service:  (none) Author Type:  (none)    Filed:   Encounter Date:  10/1/2017 Status:  (Other)           Charleen Huddleston  [address] [facility] [address]          October 3, 2017    Dear Ms. Huddleston:    This letter is to remind you that you are due for your annual exam and labs with Evelina Seo MD. Your last comprehensive visit was more than 12 months ago.    A LIMITED refill of omeprazole (PRILOSEC) 20 mg Delayed-Release capsule has been called into your pharmacy. Additional refills require you to complete this appointment.    Please call the clinic at [phone] to schedule your appointment.    If you should require additional refills before your scheduled appointment, please contact your pharmacy and we will refill your medication until the date of your appointment.    If you are no longer seeing Evelina Seo MD for primary care, please call to let us know. Doing so will remove you from our call/contact list.      Thank you for choosing [facility] and [facility] for your health care needs.    Sincerely,    Refill RN  [facility]

## 2018-08-10 DIAGNOSIS — K21.9 GASTROESOPHAGEAL REFLUX DISEASE WITHOUT ESOPHAGITIS: ICD-10-CM

## 2018-08-14 NOTE — TELEPHONE ENCOUNTER
"Refill request from [facility] for:  omeprazole (PRILOSEC) 20 MG CR capsule  LOV 10/10/2017 with Evelina Seo MD  \"Reviewed with patient problems with long term use of PPI including but not limited to change of microbia of gut sadaf due to acid changes, interstitial nephritis, decrease calcium and magnesium absorption.   Black box warning for individuals with Osteoporosis;  Omeprazole not refilled.  Trial zantac 300 mg a day\"    Medication refused and note sent to pharmacy.  Requested Prescriptions   Pending Prescriptions Disp Refills     omeprazole (PRILOSEC) 20 MG CR capsule [Pharmacy Med Name: OMEPRAZOLE DR 20 MG CAPSULE] 90 capsule 0     Sig: TAKE 1 CAPSULE BY MOUTH EVERY DAY BEFORE A MEAL    PPI Protocol Passed    8/10/2018 10:01 AM       Passed - Not on Clopidogrel (unless Pantoprazole ordered)       Passed - No diagnosis of osteoporosis on record       Passed - Recent (12 mo) or future (30 days) visit within the authorizing provider's specialty    Patient had office visit in the last 12 months or has a visit in the next 30 days with authorizing provider or within the authorizing provider's specialty.  See \"Patient Info\" tab in inbasket, or \"Choose Columns\" in Meds & Orders section of the refill encounter.           Passed - Patient is age 18 or older       Passed - No active pregnacy on record       Passed - No positive pregnancy test in past 12 months        Unable to complete prescription refill per RN Medication Refill Policy.................... Virginia Castellanos ....................  8/14/2018   4:23 PM        "

## 2018-08-17 DIAGNOSIS — K21.9 GASTROESOPHAGEAL REFLUX DISEASE WITHOUT ESOPHAGITIS: ICD-10-CM

## 2018-08-17 NOTE — TELEPHONE ENCOUNTER
"Refill request from 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   Black box warning for individuals with Osteoporosis;  Omeprazole not refilled.  Trial zantac 300 mg a day\"    Patient is required to schedule an establish care appt/ f/u appt    Letter sent to patient     Requested Prescriptions   Pending Prescriptions Disp Refills     omeprazole (PRILOSEC) 20 MG CR capsule 90 capsule 0    PPI Protocol Passed    8/17/2018  8:55 AM       Passed - Not on Clopidogrel (unless Pantoprazole ordered)       Passed - No diagnosis of osteoporosis on record       Passed - Recent (12 mo) or future (30 days) visit within the authorizing provider's specialty    Patient had office visit in the last 12 months or has a visit in the next 30 days with authorizing provider or within the authorizing provider's specialty.  See \"Patient Info\" tab in inbasket, or \"Choose Columns\" in Meds & Orders section of the refill encounter.           Passed - Patient is age 18 or older       Passed - No active pregnacy on record       Passed - No positive pregnancy test in past 12 months        Unable to complete prescription refill per RN Medication Refill Policy.................... Virginia Castellanos ....................  8/17/2018   8:58 AM        "

## 2018-08-17 NOTE — LETTER
August 17, 2018      Charleen Huddleston  [address] [place] [address]        Dear Ms. Huddleston,      A refill of omeprazole (PRILOSEC) 20 MG CR capsule has been requested by your pharmacy and it appears you are a former patient of Evelina Seo MD.  As Dr. Seo  is no longer with the clinic, we ask that you please contact [facility] to establish care with another provider.    As per your last visit with Dr. Seo, the omeprazole was not refilled due to concerns of how it might negatively affect magnesium levels and osteoporosis.  You were given a trial of Zantac and instructed to f/u to determine its effectiveness.      At your establish care appointment, a refill request for this medication can then be discussed and addressed accordingly with your new primary care physician to determine the best course of action for your health.      Your health is very important to us.  Please call the clinic at [phone] to schedule your appointment.    If you are no longer using [facility] for your healthcare needs, please call to let us know. Doing so will remove you from our call/contact list.    Thank you for choosing [facility] and [facility] for your health care needs.    Sincerely,    Refill RN  [facility]

## 2018-08-22 NOTE — TELEPHONE ENCOUNTER
Refill request from [facility] for:  Omeprazole 20 mg caps.    Addressed in encounter [id number].    Patient's omeprazole was not refilled in LOV due to concerns with long term use of PPI.  Trial of Zantac given and pt to f/u.  Noted upcoming establish care appt 8/24/2018 with Rhona Doherty NP 8/24/2018.    Contacted patient and they state they have been buying omeprazole OTC as Zantac did not work at all and she experienced severe heartburn with the Zantac.  She would like an Rx for the medication and will have it refilled in her establish care appt Friday.  Understands and is aware of the risks but states it is the only medication that works for her.    Will notify nurse Sánchez for appt prep to address medication on Friday.    Also notified [facility]    Virginia Castellanos RN  ....................  8/22/2018   12:36 PM

## 2018-08-24 ENCOUNTER — OFFICE VISIT (OUTPATIENT)
Dept: INTERNAL MEDICINE | Facility: OTHER | Age: 58
End: 2018-08-24
Attending: NURSE PRACTITIONER
Payer: COMMERCIAL

## 2018-08-24 VITALS
HEIGHT: 62 IN | DIASTOLIC BLOOD PRESSURE: 82 MMHG | HEART RATE: 68 BPM | TEMPERATURE: 97.5 F | SYSTOLIC BLOOD PRESSURE: 120 MMHG | WEIGHT: 188 LBS | BODY MASS INDEX: 34.6 KG/M2

## 2018-08-24 DIAGNOSIS — K21.9 GASTROESOPHAGEAL REFLUX DISEASE WITHOUT ESOPHAGITIS: ICD-10-CM

## 2018-08-24 DIAGNOSIS — I10 ESSENTIAL HYPERTENSION: ICD-10-CM

## 2018-08-24 DIAGNOSIS — F17.200 NICOTINE USE DISORDER: ICD-10-CM

## 2018-08-24 DIAGNOSIS — R91.8 PULMONARY NODULES: ICD-10-CM

## 2018-08-24 DIAGNOSIS — R73.9 HYPERGLYCEMIA: ICD-10-CM

## 2018-08-24 DIAGNOSIS — R79.89 ELEVATED LFTS: ICD-10-CM

## 2018-08-24 DIAGNOSIS — E78.2 MIXED HYPERLIPIDEMIA: ICD-10-CM

## 2018-08-24 DIAGNOSIS — R79.0 LOW MAGNESIUM LEVEL: ICD-10-CM

## 2018-08-24 DIAGNOSIS — L93.0 LUPUS ERYTHEMATOSUS, UNSPECIFIED FORM: Primary | ICD-10-CM

## 2018-08-24 LAB
ALBUMIN SERPL-MCNC: 4.6 G/DL (ref 3.5–5.7)
ALP SERPL-CCNC: 108 U/L (ref 34–104)
ALT SERPL W P-5'-P-CCNC: 117 U/L (ref 7–52)
ANION GAP SERPL CALCULATED.3IONS-SCNC: 8 MMOL/L (ref 3–14)
AST SERPL W P-5'-P-CCNC: 76 U/L (ref 13–39)
BILIRUB SERPL-MCNC: 0.9 MG/DL (ref 0.3–1)
BUN SERPL-MCNC: 15 MG/DL (ref 7–25)
CALCIUM SERPL-MCNC: 9.8 MG/DL (ref 8.6–10.3)
CHLORIDE SERPL-SCNC: 105 MMOL/L (ref 98–107)
CHOLEST SERPL-MCNC: 195 MG/DL
CO2 SERPL-SCNC: 24 MMOL/L (ref 21–31)
CREAT SERPL-MCNC: 0.66 MG/DL (ref 0.6–1.2)
GFR SERPL CREATININE-BSD FRML MDRD: >90 ML/MIN/1.7M2
GLUCOSE SERPL-MCNC: 117 MG/DL (ref 70–105)
HBA1C MFR BLD: 5.8 % (ref 4–6)
HDLC SERPL-MCNC: 71 MG/DL (ref 23–92)
LDLC SERPL CALC-MCNC: 98 MG/DL
NONHDLC SERPL-MCNC: 124 MG/DL
POTASSIUM SERPL-SCNC: 3.8 MMOL/L (ref 3.5–5.1)
PROT SERPL-MCNC: 7.2 G/DL (ref 6.4–8.9)
SODIUM SERPL-SCNC: 137 MMOL/L (ref 134–144)
TRIGL SERPL-MCNC: 130 MG/DL
TSH SERPL DL<=0.05 MIU/L-ACNC: 4.58 IU/ML (ref 0.34–5.6)

## 2018-08-24 PROCEDURE — 83036 HEMOGLOBIN GLYCOSYLATED A1C: CPT | Performed by: NURSE PRACTITIONER

## 2018-08-24 PROCEDURE — 80061 LIPID PANEL: CPT | Performed by: NURSE PRACTITIONER

## 2018-08-24 PROCEDURE — 84443 ASSAY THYROID STIM HORMONE: CPT | Performed by: NURSE PRACTITIONER

## 2018-08-24 PROCEDURE — 80053 COMPREHEN METABOLIC PANEL: CPT | Performed by: NURSE PRACTITIONER

## 2018-08-24 PROCEDURE — 36415 COLL VENOUS BLD VENIPUNCTURE: CPT | Performed by: NURSE PRACTITIONER

## 2018-08-24 PROCEDURE — 99215 OFFICE O/P EST HI 40 MIN: CPT | Performed by: NURSE PRACTITIONER

## 2018-08-24 RX ORDER — NICOTINE 21 MG/24HR
1 PATCH, TRANSDERMAL 24 HOURS TRANSDERMAL EVERY 24 HOURS
Qty: 30 PATCH | Refills: 1 | Status: SHIPPED | OUTPATIENT
Start: 2018-08-24 | End: 2021-03-31

## 2018-08-24 RX ORDER — ATORVASTATIN CALCIUM 20 MG/1
20 TABLET, FILM COATED ORAL AT BEDTIME
Qty: 90 TABLET | Refills: 3 | Status: SHIPPED | OUTPATIENT
Start: 2018-08-24 | End: 2019-08-24

## 2018-08-24 RX ORDER — ALBUTEROL SULFATE 90 UG/1
1-2 AEROSOL, METERED RESPIRATORY (INHALATION) EVERY 4 HOURS PRN
Qty: 1 INHALER | Refills: 11 | Status: SHIPPED | OUTPATIENT
Start: 2018-08-24 | End: 2021-09-01

## 2018-08-24 RX ORDER — LISINOPRIL/HYDROCHLOROTHIAZIDE 10-12.5 MG
1 TABLET ORAL DAILY
Qty: 90 TABLET | Refills: 3 | Status: SHIPPED | OUTPATIENT
Start: 2018-08-24 | End: 2019-09-18

## 2018-08-24 ASSESSMENT — ANXIETY QUESTIONNAIRES
IF YOU CHECKED OFF ANY PROBLEMS ON THIS QUESTIONNAIRE, HOW DIFFICULT HAVE THESE PROBLEMS MADE IT FOR YOU TO DO YOUR WORK, TAKE CARE OF THINGS AT HOME, OR GET ALONG WITH OTHER PEOPLE: NOT DIFFICULT AT ALL
2. NOT BEING ABLE TO STOP OR CONTROL WORRYING: NOT AT ALL
1. FEELING NERVOUS, ANXIOUS, OR ON EDGE: NOT AT ALL
5. BEING SO RESTLESS THAT IT IS HARD TO SIT STILL: NOT AT ALL
6. BECOMING EASILY ANNOYED OR IRRITABLE: NOT AT ALL
GAD7 TOTAL SCORE: 0
7. FEELING AFRAID AS IF SOMETHING AWFUL MIGHT HAPPEN: NOT AT ALL
3. WORRYING TOO MUCH ABOUT DIFFERENT THINGS: NOT AT ALL

## 2018-08-24 ASSESSMENT — PATIENT HEALTH QUESTIONNAIRE - PHQ9: 5. POOR APPETITE OR OVEREATING: NOT AT ALL

## 2018-08-24 ASSESSMENT — PAIN SCALES - GENERAL: PAINLEVEL: NO PAIN (0)

## 2018-08-24 NOTE — LETTER
August 24, 2018      Charleen Huddleston  [address] [place] [address]        Dear ,    We are writing to inform you of your test results.    Your recent blood work shows that your liver enzymes are slightly increased from previous but overall stable.  Your fasting blood sugar is elevated at 117.  The A1c is normal but on the high side of normal.  This means that you have prediabetes.  I strongly encourage a low carbohydrate, low-fat diet, weight loss of 5-10% of total body weight and daily exercise and to check every 6 months for diabetes.    Resulted Orders   Comprehensive metabolic panel   Result Value Ref Range    Sodium 137 134 - 144 mmol/L    Potassium 3.8 3.5 - 5.1 mmol/L    Chloride 105 98 - 107 mmol/L    Carbon Dioxide 24 21 - 31 mmol/L    Anion Gap 8 3 - 14 mmol/L    Glucose 117 (H) 70 - 105 mg/dL    Urea Nitrogen 15 7 - 25 mg/dL    Creatinine 0.66 0.60 - 1.20 mg/dL    GFR Estimate >90 >60 mL/min/1.7m2    GFR Estimate If Black >90 >60 mL/min/1.7m2    Calcium 9.8 8.6 - 10.3 mg/dL    Bilirubin Total 0.9 0.3 - 1.0 mg/dL    Albumin 4.6 3.5 - 5.7 g/dL    Protein Total 7.2 6.4 - 8.9 g/dL    Alkaline Phosphatase 108 (H) 34 - 104 U/L     (H) 7 - 52 U/L    AST 76 (H) 13 - 39 U/L   Thyrotropin GH   Result Value Ref Range    Thyrotropin 4.58 0.34 - 5.60 IU/mL   Lipid Profile   Result Value Ref Range    Cholesterol 195 <200 mg/dL    Triglycerides 130 <150 mg/dL    HDL Cholesterol 71 23 - 92 mg/dL    LDL Cholesterol Calculated 98 <100 mg/dL      Comment:      Desirable:       <100 mg/dl    Non HDL Cholesterol 124 <130 mg/dL   Hemoglobin A1c   Result Value Ref Range    Hemoglobin A1C 5.8 4.0 - 6.0 %       If you have any questions or concerns, please call the clinic at the number listed above.       Sincerely,        Elisa Doherty NP

## 2018-08-24 NOTE — NURSING NOTE
"Chief Complaint   Patient presents with     Recheck Medication     Medication Check        Initial /82 (BP Location: Right arm, Patient Position: Chair, Cuff Size: Adult Large)  Pulse 68  Temp 97.5  F (36.4  C) (Tympanic)  Ht 5' 2\" (1.575 m)  Wt 188 lb (85.3 kg)  Breastfeeding? No  BMI 34.39 kg/m2 Estimated body mass index is 34.39 kg/(m^2) as calculated from the following:    Height as of this encounter: 5' 2\" (1.575 m).    Weight as of this encounter: 188 lb (85.3 kg).  Medication Reconciliation: complete     Patient is wondering about getting Omeprazole refilled. Hasn't been taking Zantac-states Zantac never worked. Has been buyine Omeprazole OTC. Aware of potential side effects, but says its the only one that works for her.       Jazzy Mcneil LPN on 8/24/2018 at 9:47 AM    "

## 2018-08-24 NOTE — MR AVS SNAPSHOT
After Visit Summary   8/24/2018    Charleen Huddleston    MRN: [id number]           Patient Information     Date Of Birth          1960        Visit Information        Provider Department      8/24/2018 9:40 AM Elisa Doherty NP [facility]        Today's Diagnoses     Lupus erythematosus, unspecified form    -  1    Gastroesophageal reflux disease without esophagitis        Mixed hyperlipidemia        Essential hypertension        Elevated LFTs        Hyperglycemia        Pulmonary nodules        Low magnesium level        Nicotine use disorder           Follow-ups after your visit        Your next 10 appointments already scheduled     Sep 06, 2018  3:00 PM CDT   (Arrive by 2:45 PM)   CT CHEST W/O CONTRAST [facility]   [facility]    [address]  [place] [address] [phone]           Please bring any scans or X-rays taken at other hospitals, if similar tests were done. Also bring a list of your medicines, including vitamins, minerals and over-the-counter drugs. It is safest to leave personal items at home.  Be sure to tell your doctor:   If you have any allergies.   If there s any chance you are pregnant.   If you are breastfeeding.  You do not need to do anything special to prepare for this exam.  Please wear loose clothing, such as a sweat suit or jogging clothes. Avoid snaps, zippers and other metal. We may ask you to undress and put on a hospital gown.              Future tests that were ordered for you today     Open Future Orders        Priority Expected Expires Ordered    CT Chest w/o Contrast Routine  8/24/2019 8/24/2018            Who to contact     If you have questions or need follow up information about today's clinic visit or your schedule please contact [facility] directly at [phone].  Normal or non-critical lab and imaging results will be communicated  "to you by MyChart, letter or phone within 4 business days after the clinic has received the results. If you do not hear from us within 7 days, please contact the clinic through MyChart or phone. If you have a critical or abnormal lab result, we will notify you by phone as soon as possible.  Submit refill requests through [employer] or call your pharmacy and they will forward the refill request to us. Please allow 3 business days for your refill to be completed.          Additional Information About Your Visit        Care EveryWhere ID     This is your Care EveryWhere ID. This could be used by other organizations to access your [place] medical records  [id number]        Your Vitals Were     Pulse Temperature Height Breastfeeding? BMI (Body Mass Index)       68 97.5  F (36.4  C) (Tympanic) 1.575 m (5' 2\") No 34.39 kg/m2        Blood Pressure from Last 3 Encounters:   08/24/18 120/82   10/10/17 128/84   08/30/16 138/80    Weight from Last 3 Encounters:   08/24/18 85.3 kg (188 lb)   10/10/17 85 kg (187 lb 6.4 oz)   08/30/16 82.7 kg (182 lb 6.4 oz)              We Performed the Following     Comprehensive metabolic panel     Hemoglobin A1c     Lipid Profile     SMOKING CESSATION COUNSELING 3-10 MIN     Thyrotropin GH          Today's Medication Changes          These changes are accurate as of 8/24/18 10:31 AM.  If you have any questions, ask your nurse or doctor.               Start taking these medicines.        Dose/Directions    nicotine 14 MG/24HR 24 hr patch   Commonly known as:  NICODERM CQ   Used for:  Nicotine use disorder   Started by:  Elisa Doherty, NP        Dose:  1 patch   Place 1 patch onto the skin every 24 hours   Quantity:  30 patch   Refills:  1         These medicines have changed or have updated prescriptions.        Dose/Directions    fluticasone-salmeterol 250-50 MCG/DOSE diskus inhaler   Commonly known as:  ADVAIR DISKUS   This may have changed:    - when to take this  - reasons to take " this   Used for:  Lupus erythematosus, unspecified form   Changed by:  Elisa Doherty NP        Dose:  1 puff   Inhale 1 puff into the lungs 2 times daily as needed   Quantity:  1 Inhaler   Refills:  11            Where to get your medicines      These medications were sent to [facility] [address] - [place], [place] - [address]  [address] [facility] [address]     Phone:  [phone]     albuterol 108 (90 Base) MCG/ACT inhaler    atorvastatin 20 MG tablet    fluticasone-salmeterol 250-50 MCG/DOSE diskus inhaler    lisinopril-hydrochlorothiazide 10-12.5 MG per tablet    nicotine 14 MG/24HR 24 hr patch    omeprazole 20 MG CR capsule                Primary Care Provider Office Phone # Fax #    [facility] [phone]       [address]  [facility] [address]        Equal Access to Services     PENNY EARL AH: Hadii jimy mcdonough hadasho Soomaali, waaxda luqadaha, qaybta kaalmada adeegyada, keaton smith . So [facility] [phone].    ATENCIÓN: Si duranla esphimanshu, tiene a joyce disposición servicios gratuitos de asistencia lingüística. Llame al [phone].    We comply with applicable federal civil rights laws and [place] laws. We do not discriminate on the basis of race, color, national origin, age, disability, sex, sexual orientation, or gender identity.            Thank you!     Thank you for choosing [facility] AND [facility]  for your care. Our goal is always to provide you with excellent care. Hearing back from our patients is one way we can continue to improve our services. Please take a few minutes to complete the written survey that you may receive in the mail after your visit with us. Thank you!             Your Updated Medication List - Protect others around you: Learn how to safely use, store and throw away your medicines at www.disposemymeds.org.          This list is accurate as of 8/24/18 10:31 AM.  Always  use your most recent med list.                   Brand Name Dispense Instructions for use Diagnosis    albuterol 108 (90 Base) MCG/ACT inhaler    PROAIR HFA/PROVENTIL HFA/VENTOLIN HFA    1 Inhaler    Inhale 1-2 puffs into the lungs every 4 hours as needed    Lupus erythematosus, unspecified form       atorvastatin 20 MG tablet    LIPITOR    90 tablet    Take 1 tablet (20 mg) by mouth At Bedtime    Hyperglycemia       fluticasone-salmeterol 250-50 MCG/DOSE diskus inhaler    ADVAIR DISKUS    1 Inhaler    Inhale 1 puff into the lungs 2 times daily as needed    Lupus erythematosus, unspecified form       lisinopril-hydrochlorothiazide 10-12.5 MG per tablet    PRINZIDE/ZESTORETIC    90 tablet    Take 1 tablet by mouth daily    Essential hypertension       nicotine 14 MG/24HR 24 hr patch    NICODERM CQ    30 patch    Place 1 patch onto the skin every 24 hours    Nicotine use disorder       * omeprazole 20 MG CR capsule    priLOSEC     Take 1 capsule (20 mg) by mouth daily    Gastroesophageal reflux disease without esophagitis       * omeprazole 20 MG CR capsule    priLOSEC    90 capsule    TAKE 1 CAPSULE BY MOUTH ONCE DAILY BEFORE A MEAL.    Gastroesophageal reflux disease without esophagitis       other medical supplies      Magnesium spray as needed    Low magnesium level       * Notice:  This list has 2 medication(s) that are the same as other medications prescribed for you. Read the directions carefully, and ask your doctor or other care provider to review them with you.

## 2018-08-24 NOTE — PROGRESS NOTES
Subjective:  She is here today for chronic disease management.  She has her routine healthcare maintenance completed in [place] due to her 's employer.  She has free routine health examinations.  Fasting labs were completed at that appointment.  She brings those then.  Liver enzymes have remained stable with an ALT of 110, AST of 54 however glucose elevated at 122.  She has history of hepatic steatosis.  Has hyperlipidemia.  Is on Lipitor.  All other labs completed that day were normal.  She did not have TSH.  She had a normal Pap smear and mammogram.  She is not due for colonoscopy until 2023.  She does continue to smoke tobacco.  She has a 19.5 pack per year history.  She is interested in tobacco cessation.  She did not tolerate Chantix nor Wellbutrin.  She did well on nicotine patch in the past.  She does have lupus that is an active.  She has some scarring in her lungs from lupus many years ago.  She states that when she has a URI or there is smoke in the air she will have a flare.  Last week she did have some wheezing and needed to use Advair and albuterol.  She has not used it this week.  No wheezing at this time.  She also has history of pulmonary nodule in the right upper and mid lobe.  Last evaluated in 2016.  She has history of low magnesium level.  Occasionally she will take a magnesium tablet but uses a magnesium spray on her calf and it prevents calf from [place] pink.    Patient Active Problem List   Diagnosis     Elevated LFTs     HTN (hypertension)     Lupus     Nicotine use disorder     Obesity     Disorder of bursae and tendons in shoulder region     Other affections of shoulder region, not elsewhere classified     Mixed hyperlipidemia     Hyperglycemia     Pulmonary nodules     Past Medical History:   Diagnosis Date     Bacterial pneumonia     2/2/2010     Bitten by dog     9/2012,hospitalized     Bitten by dog     10/2/2012     Chronic sinusitis     Chronic Sinusitis     Nonrheumatic mitral  valve insufficiency     2/2005,Trace Mitral Regurgitation, Echo.  Does not require SBE     Other shoulder lesions, left shoulder     L shoulder RTC tendonitis     Personal history of other medical treatment (CODE)     prophylaxis     Personal history of other medical treatment (CODE)     03/11/2005,Echocardiogram 3/11/05 for family history of mitral valve and aortic valve replacement.  Trace mitral regurgitation.  Mitral valve appeared normal.  Tricuspid aortic valve without AI or AS.  No focal wall motion abnormalities.  Ejection fraction 72% 3/12/05.     Primary osteoarthritis of right knee     No Comments Provided     Tobacco use     quit 9/2012     Past Surgical History:   Procedure Laterality Date     ARTHROSCOPY KNEE      08/2001,Right, Dr. 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     ARTHROSCOPY SHOULDER      2011 / 2012,Left, supraspinatus repair     COLONOSCOPY      1/10/2013     ECHOCARDIOGRAM INTRAOPERATIVE IN OR      3/11/05,for family history of mitral valve and aortic valve replacement.  Trace mitral regurgitation.  Mitral valve appeared normal.  Tricuspid aortic valve without AI or AS.  No focal wall motion abnormalities.  Ejection fraction 72%     OTHER SURGICAL HISTORY      03/13/2006,[id number],PREMALIG/BENIGN SKIN LESION EXCISION,Excision of a benign verrucose keratosis with actinic damage and lentiginous change.     OTHER SURGICAL HISTORY      12/2006,.0,([place]) [place] SOD[place] HYALONURATE PER INJECTION,Synvisc injection     TONSILLECTOMY      No Comments Provided     Social History     Social History     Marital status:      Spouse name: N/A     Number of children: N/A     Years of education: N/A     Occupational History     Not on file.     Social History Main Topics     Smoking status: Current Every Day Smoker     Packs/day: 0.50     Years: 39.00     Types: Cigarettes     Last attempt to quit: 10/1/2012     Smokeless tobacco: Never Used     Alcohol use 1.8 oz/week      Drug use: No     Sexual activity: Yes     Partners: Male     Other Topics Concern     Not on file     Social History Narrative    .  Works at the school district.   works at [employer].      She has 3 grown children.    Lives [place] [address]. Quit smoking 9/2012    **pre upload 02/11/2013**     Family History   Problem Relation Age of Onset     Hyperlipidemia Mother      Hyperlipidemia,Hyperlipidemia     Other - See Comments Mother      Stroke,CVA 03/07     HEART DISEASE Mother      Heart Disease,aortic and mitral valve replacements, rheumatic fever     Diabetes Father      Diabetes     HEART DISEASE Sister      Heart Disease,Valvular heart disease and diffuse athersclerosis on brain MRI     Other - See Comments Sister      rheumatic fever     Other - See Comments Maternal Grandmother      Stroke,CVA     Prostate Cancer Paternal Grandfather      Cancer-prostate     Current Outpatient Prescriptions   Medication Sig Dispense Refill     albuterol (PROAIR HFA/PROVENTIL HFA/VENTOLIN HFA) 108 (90 Base) MCG/ACT inhaler Inhale 1-2 puffs into the lungs every 4 hours as needed 1 Inhaler 11     atorvastatin (LIPITOR) 20 MG tablet Take 1 tablet (20 mg) by mouth At Bedtime 90 tablet 3     fluticasone-salmeterol (ADVAIR DISKUS) 250-50 MCG/DOSE diskus inhaler Inhale 1 puff into the lungs 2 times daily as needed 1 Inhaler 11     lisinopril-hydrochlorothiazide (PRINZIDE/ZESTORETIC) 10-12.5 MG per tablet Take 1 tablet by mouth daily 90 tablet 3     nicotine (NICODERM CQ) 14 MG/24HR 24 hr patch Place 1 patch onto the skin every 24 hours 30 patch 1     omeprazole (PRILOSEC) 20 MG CR capsule Take 1 capsule (20 mg) by mouth daily       omeprazole (PRILOSEC) 20 MG CR capsule TAKE 1 CAPSULE BY MOUTH ONCE DAILY BEFORE A MEAL. 90 capsule 3     other medical supplies Magnesium spray as needed  0     [DISCONTINUED] albuterol (PROAIR HFA/PROVENTIL HFA/VENTOLIN HFA) 108 (90 BASE) MCG/ACT Inhaler Inhale 1-2 puffs into the lungs  "every 4 hours as needed       [DISCONTINUED] atorvastatin (LIPITOR) 20 MG tablet Take 20 mg by mouth At Bedtime       [DISCONTINUED] fluticasone-salmeterol (ADVAIR DISKUS) 250-50 MCG/DOSE diskus inhaler Inhale 1 puff into the lungs every 12 hours       [DISCONTINUED] lisinopril-hydrochlorothiazide (PRINZIDE/ZESTORETIC) 10-12.5 MG per tablet Take 1 tablet by mouth daily       Review of patient's allergies indicates no known allergies.      Review of Systems:  Review of Systems  12 point complete review of systems discussed with patient, positive discussed in HPI otherwise unremarkable.    Objective:   /82 (BP Location: Right arm, Patient Position: Chair, Cuff Size: Adult Large)  Pulse 68  Temp 97.5  F (36.4  C) (Tympanic)  Ht 5' 2\" (1.575 m)  Wt 188 lb (85.3 kg)  Breastfeeding? No  BMI 34.39 kg/m2  Physical Exam  Pleasant female no acute distress.  Affect normal.  Alert and oriented ×4.  Sclera nonicteric.  Conjunctival noninflamed.  Skin color pink.  Mucous membranes moist.  Neck supple and without adenopathy.  No thyromegaly.  No carotid bruits.  Lung fields clear to auscultation throughout.  Cardiovascular regular rate and rhythm.  No S3 auscultated.  Abdomen soft without masses, tenderness and organomegaly.  No abdominal bruits or pulsatile masses.  No inguinal adenopathy.  Extremities without edema.  Exposed skin without rashes.  DPPT intact.  Capillary refill less than 3 seconds.  Gait is stable.  Normal strength and sensation of lower extremities bilaterally.    Assessment:    ICD-10-CM    1. Lupus erythematosus, unspecified form L93.0 albuterol (PROAIR HFA/PROVENTIL HFA/VENTOLIN HFA) 108 (90 Base) MCG/ACT inhaler     fluticasone-salmeterol (ADVAIR DISKUS) 250-50 MCG/DOSE diskus inhaler   2. Gastroesophageal reflux disease without esophagitis K21.9 omeprazole (PRILOSEC) 20 MG CR capsule     omeprazole (PRILOSEC) 20 MG CR capsule   3. Mixed hyperlipidemia E78.2 Comprehensive metabolic panel     " Thyrotropin GH     Lipid Profile     Comprehensive metabolic panel     Thyrotropin GH     Lipid Profile   4. Essential hypertension I10 lisinopril-hydrochlorothiazide (PRINZIDE/ZESTORETIC) 10-12.5 MG per tablet     Comprehensive metabolic panel     Comprehensive metabolic panel   5. Elevated LFTs R79.89    6. Hyperglycemia R73.9 atorvastatin (LIPITOR) 20 MG tablet     Hemoglobin A1c     Hemoglobin A1c   7. Pulmonary nodules R91.8 CT Chest w/o Contrast   8. Low magnesium level R79.0 other medical supplies   9. Nicotine use disorder F17.200 CT Chest w/o Contrast     nicotine (NICODERM CQ) 14 MG/24HR 24 hr patch     SMOKING CESSATION COUNSELING 3-10 MIN       Plan:   1.  Inactive lupus with history of scarring of the lungs.  She uses inhalers intermittently.    2.  Chronic reflux disease.  She takes omeprazole daily.  She is try tapering down on the medication but it has not been effective.  Also tried H2 blockers that were ineffective.  Aware of risk associated with PPIs.  3.  Will check fasting lipids.  Continue with the Lipitor.  She has hepatic steatosis.  Weight loss encouraged.  Daughter was recently diagnosed with hypothyroidism.  Patient has not had TSH checked since 2012.  4.  Blood pressure well controlled.  Continue with same medications  .  Refills provided  5.  Patient had elevated glucose level at fasting labs in January and [place].  Will check fasting glucose and A1c at this visit.  She is asymptomatic otherwise.  She does have a family history of diabetes.  6.  She will be set up for CT chest without contrast for follow-up of pulmonary nodules.  7.  Tobacco cessation reviewed and discussed.  Counseling provided.  3 minutes of time spent with tobacco cessation counseling.  Nicotine patch prescribed.  Taper as appropriate.  8.  May continue the magnesium spray on her calves as long as it helps with the cramping.  Occasionally she will take magnesium tablets but no more than once weekly.    Elisa ROSS  ANAYA Doherty   8/24/2018  10:21 AM

## 2018-08-25 ASSESSMENT — PATIENT HEALTH QUESTIONNAIRE - PHQ9: SUM OF ALL RESPONSES TO PHQ QUESTIONS 1-9: 0

## 2018-08-25 ASSESSMENT — ANXIETY QUESTIONNAIRES: GAD7 TOTAL SCORE: 0

## 2018-09-06 ENCOUNTER — HOSPITAL ENCOUNTER (OUTPATIENT)
Dept: CT IMAGING | Facility: OTHER | Age: 58
Discharge: HOME | End: 2018-09-06
Attending: NURSE PRACTITIONER | Admitting: NURSE PRACTITIONER
Payer: COMMERCIAL

## 2018-09-06 DIAGNOSIS — R91.8 PULMONARY NODULES: ICD-10-CM

## 2018-09-06 DIAGNOSIS — F17.200 NICOTINE USE DISORDER: ICD-10-CM

## 2018-09-06 PROCEDURE — 71250 CT THORAX DX C-: CPT

## 2019-01-08 ENCOUNTER — HOSPITAL ENCOUNTER (OUTPATIENT)
Dept: GENERAL RADIOLOGY | Facility: OTHER | Age: 59
Discharge: HOME | End: 2019-01-08
Attending: NURSE PRACTITIONER | Admitting: NURSE PRACTITIONER
Payer: COMMERCIAL

## 2019-01-08 ENCOUNTER — OFFICE VISIT (OUTPATIENT)
Dept: INTERNAL MEDICINE | Facility: OTHER | Age: 59
End: 2019-01-08
Attending: NURSE PRACTITIONER
Payer: COMMERCIAL

## 2019-01-08 VITALS
TEMPERATURE: 97.7 F | RESPIRATION RATE: 16 BRPM | DIASTOLIC BLOOD PRESSURE: 82 MMHG | BODY MASS INDEX: 35.74 KG/M2 | WEIGHT: 195.4 LBS | SYSTOLIC BLOOD PRESSURE: 138 MMHG | HEART RATE: 72 BPM

## 2019-01-08 DIAGNOSIS — R79.89 ELEVATED LFTS: ICD-10-CM

## 2019-01-08 DIAGNOSIS — M77.01 MEDIAL EPICONDYLITIS OF ELBOW, RIGHT: ICD-10-CM

## 2019-01-08 DIAGNOSIS — M77.01 MEDIAL EPICONDYLITIS OF ELBOW, RIGHT: Primary | ICD-10-CM

## 2019-01-08 PROCEDURE — 99213 OFFICE O/P EST LOW 20 MIN: CPT | Performed by: NURSE PRACTITIONER

## 2019-01-08 PROCEDURE — 73080 X-RAY EXAM OF ELBOW: CPT | Mod: RT

## 2019-01-08 ASSESSMENT — PATIENT HEALTH QUESTIONNAIRE - PHQ9: SUM OF ALL RESPONSES TO PHQ QUESTIONS 1-9: 0

## 2019-01-08 NOTE — PROGRESS NOTES
Subjective:  She is here today for right elbow pain which has been occurring for the past 5 months.  It is painful especially if she pushes on the area.  She denies numbness and tingling.  No weakness.  She has not taken ibuprofen or Tylenol.  She has applied ice with little relief.  She has a history of elevated liver enzymes since 2013.  These have waxed and waned but have always been elevated.  She had ultrasound which showed hepatic steatosis.  She has been immunized against hepatitis B.  She has not been checked for hepatitis C.  She has not had CT of the abdomen and pelvis but did have abdominal ultrasound a few years ago.  She uses alcohol maybe once every 2 weeks with a couple of glasses of wine.  She states she has a lot on her plate right now with her  being diagnosed with bladder cancer and has a lot of bills and does not want to have any further evaluation at this time.  She does have known lupus.  Her daughter also has elevated liver enzymes and hepatic steatosis.  Patient takes Lipitor.  Denies nausea, corinne colored stools, abdominal pain and jaundice.    Patient Active Problem List   Diagnosis     Elevated LFTs     HTN (hypertension)     Lupus     Nicotine use disorder     Obesity     Disorder of bursae and tendons in shoulder region     Other affections of shoulder region, not elsewhere classified     Mixed hyperlipidemia     Hyperglycemia     Pulmonary nodules     Past Medical History:   Diagnosis Date     Bacterial pneumonia     2/2/2010     Bitten by dog     9/2012,hospitalized     Bitten by dog     10/2/2012     Chronic sinusitis     Chronic Sinusitis     Nonrheumatic mitral valve insufficiency     2/2005,Trace Mitral Regurgitation, Echo.  Does not require SBE     Other shoulder lesions, left shoulder     L shoulder RTC tendonitis     Personal history of other medical treatment (CODE)     prophylaxis     Personal history of other medical treatment (CODE)     03/11/2005,Echocardiogram  3/11/05 for family history of mitral valve and aortic valve replacement.  Trace mitral regurgitation.  Mitral valve appeared normal.  Tricuspid aortic valve without AI or AS.  No focal wall motion abnormalities.  Ejection fraction 72% 3/12/05.     Primary osteoarthritis of right knee     No Comments Provided     Tobacco use     quit 2012     Past Surgical History:   Procedure Laterality Date     ARTHROSCOPY KNEE      2001,Right, Dr. 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     ARTHROSCOPY SHOULDER      2011,Left, supraspinatus repair     COLONOSCOPY  01/10/2013    1/10/2013     ECHOCARDIOGRAM INTRAOPERATIVE IN OR      3/11/05,for family history of mitral valve and aortic valve replacement.  Trace mitral regurgitation.  Mitral valve appeared normal.  Tricuspid aortic valve without AI or AS.  No focal wall motion abnormalities.  Ejection fraction 72%     OTHER SURGICAL HISTORY      2006,ARU238,PREMALIG/BENIGN SKIN LESION EXCISION,Excision of a benign verrucose keratosis with actinic damage and lentiginous change.     OTHER SURGICAL HISTORY      2006,.0,([place]) [place] SODIUM HYALONURATE PER INJECTION,Synvisc injection     TONSILLECTOMY      No Comments Provided     Social History     Socioeconomic History     Marital status:      Spouse name: Not on file     Number of children: Not on file     Years of education: Not on file     Highest education level: Not on file   Social Needs     Financial resource strain: Not on file     Food insecurity - worry: Not on file     Food insecurity - inability: Not on file     Transportation needs - medical: Not on file     Transportation needs - non-medical: Not on file   Occupational History     Not on file   Tobacco Use     Smoking status: Current Every Day Smoker     Packs/day: 0.50     Years: 39.00     Pack years: 19.50     Types: Cigarettes     Last attempt to quit: 10/1/2012     Years since quittin.2     Smokeless  tobacco: Never Used   Substance and Sexual Activity     Alcohol use: Yes     Alcohol/week: 1.8 oz     Drug use: No     Sexual activity: Yes     Partners: Male   Other Topics Concern     Not on file   Social History Narrative    .  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      She has 3 grown children.    L[place] [address]. Quit smoking 9/2012    **pre upload 02/11/2013**     Family History   Problem Relation Age of Onset     Hyperlipidemia Mother         Hyperlipidemia,Hyperlipidemia     Other - See Comments Mother         Stroke,CVA 03/07     Heart Disease Mother         Heart Disease,aortic and mitral valve replacements, rheumatic fever     Diabetes Father         Diabetes     Heart Disease Sister         Heart Disease,Valvular heart disease and diffuse athersclerosis on brain MRI     Other - See Comments Sister         rheumatic fever     Other - See Comments Maternal Grandmother         Stroke,CVA     Prostate Cancer Paternal Grandfather         Cancer-prostate     Current Outpatient Medications   Medication Sig Dispense Refill     albuterol (PROAIR HFA/PROVENTIL HFA/VENTOLIN HFA) 108 (90 Base) MCG/ACT inhaler Inhale 1-2 puffs into the lungs every 4 hours as needed 1 Inhaler 11     atorvastatin (LIPITOR) 20 MG tablet Take 1 tablet (20 mg) by mouth At Bedtime 90 tablet 3     fluticasone-salmeterol (ADVAIR DISKUS) 250-50 MCG/DOSE diskus inhaler Inhale 1 puff into the lungs 2 times daily as needed 1 Inhaler 11     lisinopril-hydrochlorothiazide (PRINZIDE/ZESTORETIC) 10-12.5 MG per tablet Take 1 tablet by mouth daily 90 tablet 3     nicotine (NICODERM CQ) 14 MG/24HR 24 hr patch Place 1 patch onto the skin every 24 hours 30 patch 1     omeprazole (PRILOSEC) 20 MG CR capsule TAKE 1 CAPSULE BY MOUTH ONCE DAILY BEFORE A MEAL. 90 capsule 3     other medical supplies Magnesium spray as needed  0     Patient has no known allergies.      Review of Systems:  Review of Systems  See HPI  Objective:    /82 (BP Location: Right arm, Patient Position: Chair, Cuff Size: Adult Regular)   Pulse 72   Temp 97.7  F (36.5  C) (Tympanic)   Resp 16   Wt 88.6 kg (195 lb 6.4 oz)   Breastfeeding? No   BMI 35.74 kg/m    Physical Exam  Female no acute distress.  Alert and oriented x4.  Affect normal except she does become tearful with discussing her 's bladder cancer.  There is tenderness with palpation to the medial epicondyles.  No lateral epicondyle tenderness.  Normal range of motion is intact.  Right elbow x-ray: No acute findings, no osteoarthritic abnormalities    Assessment:    ICD-10-CM    1. Medial epicondylitis of elbow, right M77.01 XR Elbow Right G/E 3 Views   2. Elevated LFTs R94.5        Plan:   Recommend Aleve 1 tablet twice daily or 2 pills once daily with food for the next 2 weeks, ice to the affected area for 15 minutes 3 times daily, offload pressure and prevent activity which can flare her symptoms.  She is given a tennis elbow strap.  If she is not finding improvement in her symptoms in the next couple of weeks and recommend that she schedule an appointment with orthopedics a steroid injection may be beneficial.  Side effects of Aleve use reviewed and discussed with patient she expresses understanding.  She has chronically elevated liver enzymes.  I see that she has not had evaluation for hepatitis C.  She states she has been immunized for hepatitis B.  Would recommend being checked for hepatitis C even though her risk factors are low and possibly other workup as needed.    ANAYA Zuniga   1/8/2019  1:04 PM

## 2019-08-24 DIAGNOSIS — K21.9 GASTROESOPHAGEAL REFLUX DISEASE WITHOUT ESOPHAGITIS: ICD-10-CM

## 2019-08-24 DIAGNOSIS — R73.9 HYPERGLYCEMIA: ICD-10-CM

## 2019-08-24 NOTE — LETTER
August 28, 2019      Charleen Huddleston  [id number]  [facility] [id number]        A refill request was received from your pharmacy for Omeprazole.    Additional refills require an office visit with Elisa ZAMBRANO for annual review and labs .    Please call [phone] to schedule appointment.        Sincerely,      Refill Nurse

## 2019-08-28 RX ORDER — ATORVASTATIN CALCIUM 20 MG/1
20 TABLET, FILM COATED ORAL AT BEDTIME
Qty: 90 TABLET | Refills: 0 | Status: SHIPPED | OUTPATIENT
Start: 2019-08-28 | End: 2019-09-18

## 2019-08-28 NOTE — TELEPHONE ENCOUNTER
Routing refill request to provider for review/approval because:  Patient has no PCP noted but has seen Elisa ZAMBRANO in past will route for her review and consideration.        LOV: 1/8/19  Last labs; 8/24/18  Patient is due for annual review and labs.    Letter and my chart message sent.    Amelia Lopez RN on 8/28/2019 at 1:40 PM

## 2019-09-06 ENCOUNTER — TELEPHONE (OUTPATIENT)
Dept: INTERNAL MEDICINE | Facility: OTHER | Age: 59
End: 2019-09-06

## 2019-09-06 DIAGNOSIS — Z11.59 NEED FOR HEPATITIS C SCREENING TEST: ICD-10-CM

## 2019-09-06 DIAGNOSIS — R79.89 ELEVATED LFTS: ICD-10-CM

## 2019-09-06 DIAGNOSIS — I10 ESSENTIAL HYPERTENSION: ICD-10-CM

## 2019-09-06 DIAGNOSIS — E78.2 MIXED HYPERLIPIDEMIA: Primary | ICD-10-CM

## 2019-09-06 NOTE — TELEPHONE ENCOUNTER
Patient is scheduled to see V on Wednesday September 18, 2019 with labs prior to appointment. Could you please place orders for lab work that she will be due for. Thank you!

## 2019-09-10 ENCOUNTER — HOSPITAL ENCOUNTER (OUTPATIENT)
Dept: MAMMOGRAPHY | Facility: OTHER | Age: 59
Discharge: HOME | End: 2019-09-10
Attending: NURSE PRACTITIONER | Admitting: NURSE PRACTITIONER
Payer: COMMERCIAL

## 2019-09-10 DIAGNOSIS — Z12.31 VISIT FOR SCREENING MAMMOGRAM: ICD-10-CM

## 2019-09-10 PROCEDURE — 77063 BREAST TOMOSYNTHESIS BI: CPT

## 2019-09-18 ENCOUNTER — OFFICE VISIT (OUTPATIENT)
Dept: INTERNAL MEDICINE | Facility: OTHER | Age: 59
End: 2019-09-18
Attending: NURSE PRACTITIONER
Payer: COMMERCIAL

## 2019-09-18 VITALS
TEMPERATURE: 96.5 F | HEIGHT: 63 IN | SYSTOLIC BLOOD PRESSURE: 140 MMHG | DIASTOLIC BLOOD PRESSURE: 78 MMHG | HEART RATE: 79 BPM | BODY MASS INDEX: 34.5 KG/M2 | OXYGEN SATURATION: 95 % | WEIGHT: 194.7 LBS | RESPIRATION RATE: 16 BRPM

## 2019-09-18 DIAGNOSIS — F17.200 NICOTINE USE DISORDER: ICD-10-CM

## 2019-09-18 DIAGNOSIS — J45.20 MILD INTERMITTENT ASTHMA WITHOUT COMPLICATION: ICD-10-CM

## 2019-09-18 DIAGNOSIS — E78.2 MIXED HYPERLIPIDEMIA: ICD-10-CM

## 2019-09-18 DIAGNOSIS — Z78.9 ALCOHOL USE: ICD-10-CM

## 2019-09-18 DIAGNOSIS — K21.9 GASTROESOPHAGEAL REFLUX DISEASE WITHOUT ESOPHAGITIS: ICD-10-CM

## 2019-09-18 DIAGNOSIS — M65.4 TENOSYNOVITIS, DE QUERVAIN: ICD-10-CM

## 2019-09-18 DIAGNOSIS — R79.89 ELEVATED LFTS: ICD-10-CM

## 2019-09-18 DIAGNOSIS — R91.8 PULMONARY NODULES: ICD-10-CM

## 2019-09-18 DIAGNOSIS — I10 ESSENTIAL HYPERTENSION: ICD-10-CM

## 2019-09-18 DIAGNOSIS — R73.9 HYPERGLYCEMIA: ICD-10-CM

## 2019-09-18 DIAGNOSIS — I10 ESSENTIAL HYPERTENSION: Primary | ICD-10-CM

## 2019-09-18 DIAGNOSIS — Z11.59 NEED FOR HEPATITIS C SCREENING TEST: ICD-10-CM

## 2019-09-18 DIAGNOSIS — K76.0 HEPATIC STEATOSIS: ICD-10-CM

## 2019-09-18 DIAGNOSIS — R73.03 PREDIABETES: ICD-10-CM

## 2019-09-18 LAB
ALBUMIN SERPL-MCNC: 4.5 G/DL (ref 3.5–5.7)
ALP SERPL-CCNC: 103 U/L (ref 34–104)
ALT SERPL W P-5'-P-CCNC: 136 U/L (ref 7–52)
ANION GAP SERPL CALCULATED.3IONS-SCNC: 11 MMOL/L (ref 3–14)
AST SERPL W P-5'-P-CCNC: 99 U/L (ref 13–39)
BILIRUB SERPL-MCNC: 0.7 MG/DL (ref 0.3–1)
BUN SERPL-MCNC: 12 MG/DL (ref 7–25)
CALCIUM SERPL-MCNC: 9.4 MG/DL (ref 8.6–10.3)
CHLORIDE SERPL-SCNC: 102 MMOL/L (ref 98–107)
CHOLEST SERPL-MCNC: 206 MG/DL
CO2 SERPL-SCNC: 24 MMOL/L (ref 21–31)
CREAT SERPL-MCNC: 0.7 MG/DL (ref 0.6–1.2)
GFR SERPL CREATININE-BSD FRML MDRD: 85 ML/MIN/{1.73_M2}
GLUCOSE SERPL-MCNC: 111 MG/DL (ref 70–105)
HDLC SERPL-MCNC: 64 MG/DL (ref 23–92)
LDLC SERPL CALC-MCNC: 104 MG/DL
NONHDLC SERPL-MCNC: 142 MG/DL
POTASSIUM SERPL-SCNC: 4 MMOL/L (ref 3.5–5.1)
PROT SERPL-MCNC: 7.2 G/DL (ref 6.4–8.9)
SODIUM SERPL-SCNC: 137 MMOL/L (ref 134–144)
TRIGL SERPL-MCNC: 189 MG/DL

## 2019-09-18 PROCEDURE — 80053 COMPREHEN METABOLIC PANEL: CPT | Mod: ZL | Performed by: NURSE PRACTITIONER

## 2019-09-18 PROCEDURE — 99215 OFFICE O/P EST HI 40 MIN: CPT | Performed by: NURSE PRACTITIONER

## 2019-09-18 PROCEDURE — 36415 COLL VENOUS BLD VENIPUNCTURE: CPT | Mod: ZL | Performed by: NURSE PRACTITIONER

## 2019-09-18 PROCEDURE — 86803 HEPATITIS C AB TEST: CPT | Mod: ZL | Performed by: NURSE PRACTITIONER

## 2019-09-18 PROCEDURE — 80061 LIPID PANEL: CPT | Mod: ZL | Performed by: NURSE PRACTITIONER

## 2019-09-18 RX ORDER — ATORVASTATIN CALCIUM 20 MG/1
20 TABLET, FILM COATED ORAL AT BEDTIME
Qty: 90 TABLET | Refills: 3 | Status: SHIPPED | OUTPATIENT
Start: 2019-09-18 | End: 2019-10-10

## 2019-09-18 RX ORDER — LISINOPRIL/HYDROCHLOROTHIAZIDE 10-12.5 MG
1 TABLET ORAL DAILY
Qty: 90 TABLET | Refills: 3 | Status: CANCELLED | OUTPATIENT
Start: 2019-09-18

## 2019-09-18 RX ORDER — LOSARTAN POTASSIUM AND HYDROCHLOROTHIAZIDE 12.5; 5 MG/1; MG/1
1 TABLET ORAL DAILY
Qty: 90 TABLET | Refills: 3 | Status: SHIPPED | OUTPATIENT
Start: 2019-09-18 | End: 2020-02-28

## 2019-09-18 SDOH — HEALTH STABILITY: MENTAL HEALTH: HOW OFTEN DO YOU HAVE A DRINK CONTAINING ALCOHOL?: MONTHLY OR LESS

## 2019-09-18 ASSESSMENT — ENCOUNTER SYMPTOMS
WOUND: 0
EYE REDNESS: 0
DIARRHEA: 0
MYALGIAS: 0
ABDOMINAL DISTENTION: 0
VOMITING: 0
ABDOMINAL PAIN: 0
SORE THROAT: 0
DYSURIA: 0
SHORTNESS OF BREATH: 0
CONSTIPATION: 0
HEARTBURN: 0
COUGH: 1
CONFUSION: 0
HEMATURIA: 0
NAUSEA: 0
EYE DISCHARGE: 0
DYSPHORIC MOOD: 0
HEMATOCHEZIA: 0
ANAL BLEEDING: 0
ADENOPATHY: 0
POLYPHAGIA: 0
FEVER: 0
PALPITATIONS: 0
NERVOUS/ANXIOUS: 0
ARTHRALGIAS: 0
DIZZINESS: 0
PARESTHESIAS: 0
SLEEP DISTURBANCE: 0
UNEXPECTED WEIGHT CHANGE: 0
FREQUENCY: 0
WHEEZING: 0
POLYDIPSIA: 0
CHILLS: 0
HEADACHES: 0
NUMBNESS: 0

## 2019-09-18 ASSESSMENT — PAIN SCALES - GENERAL: PAINLEVEL: SEVERE PAIN (7)

## 2019-09-18 ASSESSMENT — MIFFLIN-ST. JEOR: SCORE: 1428.4

## 2019-09-18 NOTE — NURSING NOTE
Chief Complaint   Patient presents with     Medication Therapy Management     labs       Medication Reconciliation: complete  Morenita Rudolph LPN  ..................9/18/2019   7:42 AM

## 2019-09-18 NOTE — PROGRESS NOTES
Subjective:  She is here today for chronic disease management.  She has hypertension, hyperlipidemia and prediabetes.  She was planned to work on diet, exercise and weight loss over the past year but was not able to follow through.  She is had a very busy year.  She does admit that she is been eating out a lot and has been drinking more alcohol than she should especially over the summer.  He had labs checked earlier today.  This did show that she had a climb in her liver function enzymes from her baseline which is a mild elevation.  She has been diagnosed in the past with hepatic steatosis through ultrasound.  She has never seen gastroenterology.  She does also have a lupus and that has been an active and stable.  She uses tobacco and has greater than a 30 pack/year history.  She has had pulmonary nodules with last lung CT being a year ago showing stability.  She does meet criteria for lung cancer screening with low-dose lung CT.  She does have a dry cough.  She feels this is related to lisinopril.  She would like to try alternative.  She does have history of mild intermittent asthma but has not needed albuterol nor Advair in quite some time.  She does not want immunization update.  She does not receive influenza vaccine.  Up-to-date on colonoscopy.  Had mammogram.  She is having some pain at the left thumb with movement.  She was skinning chickens with her son and his family a couple of months ago and this is still painful.  A couple weeks ago she did purchase an over-the-counter thumb wrist splint and that has helped.  It is still mildly uncomfortable.  She did not have any acute injury or trauma.  No decreased strength.    Patient Active Problem List   Diagnosis     Elevated LFTs     HTN (hypertension)     Lupus (H)     Nicotine use disorder     Obesity     Disorder of bursae and tendons in shoulder region     Other affections of shoulder region, not elsewhere classified     Mixed hyperlipidemia     Hyperglycemia      Pulmonary nodules     Hepatic steatosis     Prediabetes     Gastroesophageal reflux disease without esophagitis     Past Medical History:   Diagnosis Date     Bacterial pneumonia     2/2/2010     Bitten by dog     9/2012,hospitalized     Bitten by dog     10/2/2012     Chronic sinusitis     Chronic Sinusitis     Nonrheumatic mitral valve insufficiency     2/2005,Trace Mitral Regurgitation, Echo.  Does not require SBE     Other shoulder lesions, left shoulder     L shoulder RTC tendonitis     Personal history of other medical treatment (CODE)     prophylaxis     Personal history of other medical treatment (CODE)     03/11/2005,Echocardiogram 3/11/05 for family history of mitral valve and aortic valve replacement.  Trace mitral regurgitation.  Mitral valve appeared normal.  Tricuspid aortic valve without AI or AS.  No focal wall motion abnormalities.  Ejection fraction 72% 3/12/05.     Primary osteoarthritis of right knee     No Comments Provided     Tobacco use     quit 9/2012     Past Surgical History:   Procedure Laterality Date     ARTHROSCOPY KNEE      08/2001,Right, Dr. 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     ARTHROSCOPY SHOULDER      2011 / 2012,Left, supraspinatus repair     COLONOSCOPY  01/10/2013    hyperplastic, 1/10/23     ECHOCARDIOGRAM INTRAOPERATIVE IN OR      3/11/05,for family history of mitral valve and aortic valve replacement.  Trace mitral regurgitation.  Mitral valve appeared normal.  Tricuspid aortic valve without AI or AS.  No focal wall motion abnormalities.  Ejection fraction 72%     OTHER SURGICAL HISTORY      03/13/2006,[id number],PREMALIG/BENIGN SKIN LESION EXCISION,Excision of a benign verrucose keratosis with actinic damage and lentiginous change.     OTHER SURGICAL HISTORY      12/2006,.0,([place]) [place] SODIUM HYALONURATE PER INJECTION,Synvisc injection     TONSILLECTOMY      No Comments Provided     Social History     Socioeconomic History     Marital  status:      Spouse name: Not on file     Number of children: Not on file     Years of education: Not on file     Highest education level: Not on file   Occupational History     Not on file   Social Needs     Financial resource strain: Not on file     Food insecurity:     Worry: Not on file     Inability: Not on file     Transportation needs:     Medical: Not on file     Non-medical: Not on file   Tobacco Use     Smoking status: Current Every Day Smoker     Packs/day: 0.75     Years: 45.00     Pack years: 33.75     Types: Cigarettes     Last attempt to quit: 10/1/2012     Years since quittin.9     Smokeless tobacco: Never Used   Substance and Sexual Activity     Alcohol use: Yes     Alcohol/week: 1.8 oz     Frequency: Monthly or less     Drug use: No     Sexual activity: Yes     Partners: Male   Lifestyle     Physical activity:     Days per week: Not on file     Minutes per session: Not on file     Stress: Not on file   Relationships     Social connections:     Talks on phone: Not on file     Gets together: Not on file     Attends Latter day service: Not on file     Active member of club or organization: Not on file     Attends meetings of clubs or organizations: Not on file     Relationship status: Not on file     Intimate partner violence:     Fear of current or ex partner: Not on file     Emotionally abused: Not on file     Physically abused: Not on file     Forced sexual activity: Not on file   Other Topics Concern     Not on file   Social History Narrative    .  Works at the school district.   works at [employer].      She has 3 grown children.    L[place] on [address]. Quit smoking 2012    **pre upload 2013**     Family History   Problem Relation Age of Onset     Hyperlipidemia Mother         Hyperlipidemia,Hyperlipidemia     Other - See Comments Mother         Stroke,CVA      Heart Disease Mother         Heart Disease,aortic and mitral valve replacements, rheumatic fever      Diabetes Father         Diabetes     Heart Disease Sister         Heart Disease,Valvular heart disease and diffuse athersclerosis on brain MRI     Other - See Comments Sister         rheumatic fever     Other - See Comments Maternal Grandmother         Stroke,CVA     Prostate Cancer Paternal Grandfather         Cancer-prostate     Current Outpatient Medications   Medication Sig Dispense Refill     atorvastatin (LIPITOR) 20 MG tablet Take 1 tablet (20 mg) by mouth At Bedtime 90 tablet 3     losartan-hydrochlorothiazide (HYZAAR) 50-12.5 MG tablet Take 1 tablet by mouth daily 90 tablet 3     omeprazole (PRILOSEC) 20 MG DR capsule TAKE 1 CAPSULE BY MOUTH EVERY DAY BEFORE A MEAL 90 capsule 3     albuterol (PROAIR HFA/PROVENTIL HFA/VENTOLIN HFA) 108 (90 Base) MCG/ACT inhaler Inhale 1-2 puffs into the lungs every 4 hours as needed 1 Inhaler 11     fluticasone-salmeterol (ADVAIR DISKUS) 250-50 MCG/DOSE diskus inhaler Inhale 1 puff into the lungs 2 times daily as needed 1 Inhaler 11     nicotine (NICODERM CQ) 14 MG/24HR 24 hr patch Place 1 patch onto the skin every 24 hours 30 patch 1     other medical supplies Magnesium spray as needed  0     Lisinopril      Review of Systems:  Review of Systems   Constitutional: Negative for chills, fever and unexpected weight change.   HENT: Negative for congestion, ear pain and sore throat.    Eyes: Negative for discharge and redness.   Respiratory: Positive for cough. Negative for shortness of breath and wheezing.    Cardiovascular: Negative for chest pain, palpitations and peripheral edema.   Gastrointestinal: Negative for abdominal distention, abdominal pain, anal bleeding, constipation, diarrhea, heartburn, hematochezia, nausea and vomiting.   Endocrine: Negative for cold intolerance, heat intolerance, polydipsia, polyphagia and polyuria.   Breasts:  negative.    Genitourinary: Negative for dysuria, frequency, hematuria and vaginal bleeding.   Musculoskeletal: Negative for  "arthralgias and myalgias.        Pain at left thumb   Skin: Negative for pallor, rash and wound.   Allergic/Immunologic: Negative for immunocompromised state.   Neurological: Negative for dizziness, syncope, numbness, headaches and paresthesias.   Hematological: Negative for adenopathy.   Psychiatric/Behavioral: Negative for confusion, dysphoric mood and sleep disturbance. The patient is not nervous/anxious.        Objective:   BP (!) 140/78 (BP Location: Right arm, Patient Position: Sitting, Cuff Size: Adult Regular)   Pulse 79   Temp 96.5  F (35.8  C) (Tympanic)   Resp 16   Ht 1.61 m (5' 3.39\")   Wt 88.3 kg (194 lb 11.2 oz)   SpO2 95%   BMI 34.07 kg/m    Physical Exam  Pleasant morbidly obese female in no acute distress.  Affect normal.  Alert and oriented x4.  Sclera nonicteric.  Conjunctival noninflamed.  Mucous memories moist.  Neck supple without adenopathy.  No thyromegaly.  No carotid bruits.  Lung fields clear to auscultation throughout.  Cardiovascular regular rate and rhythm with a 2/6 systolic murmur.  Abdomen is soft without masses, tenderness and organomegaly.  No abdominal bruits or pulsatile masses.  No axillary or inguinal adenopathy.  Extremities with trace bilateral edema.  DP PT intact.  Capillary refill less than 3 seconds.  Left thumb without swelling and decreased range of motion.  Positive Finkelstein test.    Assessment:    ICD-10-CM    1. Essential hypertension I10 losartan-hydrochlorothiazide (HYZAAR) 50-12.5 MG tablet   2. Gastroesophageal reflux disease without esophagitis K21.9 omeprazole (PRILOSEC) 20 MG DR capsule   3. Prediabetes R73.03    4. Hyperglycemia R73.9 atorvastatin (LIPITOR) 20 MG tablet   5. Mixed hyperlipidemia E78.2    6. Nicotine use disorder F17.200    7. Pulmonary nodules R91.8    8. Elevated LFTs R94.5    9. Hepatic steatosis K76.0    10. Alcohol use Z78.9    11. Lupus (H) M32.9    12. Tenosynovitis, de Quervain M65.4        Plan:   1.  Blood pressure " borderline elevated even with medication.  Discontinue the lisinopril HCTZ and start losartan 50/HCTZ 12.5 mg daily.  Goal blood pressure is 135/90 or better.  Follow-up if not controlled.  2.  Discussed with patient that she has evidence of prediabetes with fasting hyperglycemia.  Recommended checking A1c today but she would like to hold off.  She wants to work on diet and lifestyle modifications.  She is going to work on exercise and trying to lose between 5 and 10% of her body weight and following a low-cholesterol, low carbohydrate diet.  3.  GERD well controlled.  Continue omeprazole.  4.  She is going to work on tobacco cessation.  She purchased nicotine patches.  Discussed concomitant use with Nicotrol Inhaler if needed.  If these options are not effective she will let me know.  She also does meet criteria for lung cancer screening.  Recommended that she have another chest CT but patient would like to hold off as she will be getting new insurance after the beginning of the year.  She will let me know.  In the meantime she can work on tobacco cessation.  Risk and benefits of tobacco use reviewed and discussed with patient and the importance of lung cancer screening is also discussed.  5.  Lupus has been stable.  6.  Liver enzymes are slightly higher than last checked a year ago.  She has been using alcohol frequently.  I have asked that she discontinue alcohol or at a minimum reduced significantly down to no more than 1 ounce once or twice weekly.  She will work on this.  She has had an ultrasound which has indicated hepatic steatosis.  She is at risk due to her prediabetes and obesity however would recommend that she be evaluated by gastroenterology to work-up further.  Hepatitis C screening is pending.  She otherwise is asymptomatic.  She was to hold off on gastroenterology consult at this time but will consider after beginning of the year with new insurance.  7.  Patient has evidence of tenosynovitis.  She  will continue with the thumb spica splint.  Wear this for at least the next month.  She will try Aleve 1 or 2 tablets daily only for the next week to be taken with food.  Explained this could elevate her blood pressure and other potential side effects of the medication.  She will use sparingly.  If not improved symptoms within 1 month of wearing the splint in the 7-day course of NSAID she will let me know and we can refer her to orthopedics.  8.  She will be due for colonoscopy or Cologuard in 2023.  Pap will be due next year as her last Pap was in 2017.  Mammogram is up-to-date.  Recommended Prevnar 13 and influenza vaccine however she declines at this time.    ANAYA Zuniga   9/18/2019  8:24 AM

## 2019-09-19 DIAGNOSIS — L93.0 LUPUS ERYTHEMATOSUS, UNSPECIFIED FORM: ICD-10-CM

## 2019-09-19 LAB — HCV AB SERPL QL IA: NONREACTIVE

## 2019-09-23 NOTE — TELEPHONE ENCOUNTER
Prescription approved per [facility] Refill Protocol.  Asthma reviewed in office visit notes on 9-18-18, med continued. Dorothea Saeed RN on 9/23/2019 at 4:13 PM

## 2019-10-07 DIAGNOSIS — I10 ESSENTIAL HYPERTENSION: ICD-10-CM

## 2019-10-09 RX ORDER — LISINOPRIL/HYDROCHLOROTHIAZIDE 10-12.5 MG
TABLET ORAL
Qty: 90 TABLET | Refills: 3 | OUTPATIENT
Start: 2019-10-09

## 2019-10-10 DIAGNOSIS — I10 ESSENTIAL HYPERTENSION: Primary | ICD-10-CM

## 2019-10-10 DIAGNOSIS — R73.9 HYPERGLYCEMIA: ICD-10-CM

## 2019-10-10 DIAGNOSIS — I10 ESSENTIAL HYPERTENSION: ICD-10-CM

## 2019-10-10 RX ORDER — ATORVASTATIN CALCIUM 20 MG/1
20 TABLET, FILM COATED ORAL AT BEDTIME
Qty: 90 TABLET | Refills: 3 | Status: SHIPPED | OUTPATIENT
Start: 2019-10-10 | End: 2020-02-28

## 2019-10-10 RX ORDER — HYDROCHLOROTHIAZIDE 12.5 MG/1
12.5 CAPSULE ORAL DAILY
Qty: 90 CAPSULE | Refills: 0 | Status: SHIPPED | OUTPATIENT
Start: 2019-10-10 | End: 2020-01-13

## 2019-10-10 RX ORDER — LOSARTAN POTASSIUM 50 MG/1
50 TABLET ORAL DAILY
Qty: 90 TABLET | Refills: 0 | Status: SHIPPED | OUTPATIENT
Start: 2019-10-10 | End: 2020-02-28

## 2019-10-10 NOTE — TELEPHONE ENCOUNTER
Patient seen by PCP on 9-18-19, Prinzide was changed to Hyzaar.   Per patient, pharmacist at [facility] said they can't get losartan -hydrochlorothiazide due to recall. Does not want to go back to previous med.     Pharmacy suggested ordering as two separate Rx in order to avoid recalled formulation. Set up orders as requested for review/approval.     Atorvastatin approved per [facility] refill protocol, will be out tomorrow. Dorothea Saeed RN on 10/10/2019 at 9:35 AM

## 2019-10-11 RX ORDER — LISINOPRIL/HYDROCHLOROTHIAZIDE 10-12.5 MG
TABLET ORAL
Qty: 90 TABLET | Refills: 3 | Status: SHIPPED | OUTPATIENT
Start: 2019-10-11 | End: 2019-10-15

## 2019-10-11 NOTE — TELEPHONE ENCOUNTER
Lisinopril/Hydrochlorothiazide      Last Written Prescription Date:  ?  Last Fill Quantity: ?,   # refills: ?  Last Office Visit: 9/18/19  Future Office visit:       Routing refill request to provider for review/approval because:  Drug not active on patient's medication list  Brenda J. Goodell, RN on 10/11/2019 at 11:50 AM

## 2019-10-13 ENCOUNTER — MYC REFILL (OUTPATIENT)
Dept: INTERNAL MEDICINE | Facility: OTHER | Age: 59
End: 2019-10-13

## 2019-10-13 ENCOUNTER — MYC MEDICAL ADVICE (OUTPATIENT)
Dept: INTERNAL MEDICINE | Facility: OTHER | Age: 59
End: 2019-10-13

## 2019-10-13 DIAGNOSIS — I10 ESSENTIAL HYPERTENSION: ICD-10-CM

## 2019-10-13 RX ORDER — LISINOPRIL/HYDROCHLOROTHIAZIDE 10-12.5 MG
1 TABLET ORAL DAILY
Qty: 90 TABLET | Refills: 3 | Status: CANCELLED | OUTPATIENT
Start: 2019-10-13

## 2019-10-14 NOTE — TELEPHONE ENCOUNTER
I am confused about her medications.  Looks like she has an allergy to lisinopril and that she was suppose to be on losartan instead of lisinopril.  Which one is she needing a refill?  Also, I did not know that atorvastatin is on recall, please confirm this with pharmacy and patient. Also, atorvastatin does not cause a cough.  Lisinoopril would have possibly caused a cough.

## 2019-10-15 NOTE — TELEPHONE ENCOUNTER
Spoke with Saul at [facility]. They state that all are on recall. However they have just received the losartan today. Patient may bring lisinopril back and they will give her new med.  Called and spoke with patient she will  medication at [facility]. Morenita Rudolph LPN .............10/15/2019  9:58 AM

## 2019-10-15 NOTE — TELEPHONE ENCOUNTER
Called and spoke with [facility]. They state that losartan is on back order so they temporarily gave lisinopril.  There is no recall on atorvastatin. Morenita Rudolph LPN .............10/15/2019  9:26 AM

## 2020-01-13 ENCOUNTER — MYC REFILL (OUTPATIENT)
Dept: INTERNAL MEDICINE | Facility: OTHER | Age: 60
End: 2020-01-13

## 2020-01-13 DIAGNOSIS — I10 ESSENTIAL HYPERTENSION: ICD-10-CM

## 2020-01-13 RX ORDER — HYDROCHLOROTHIAZIDE 12.5 MG/1
12.5 CAPSULE ORAL DAILY
Qty: 90 CAPSULE | Refills: 0 | Status: SHIPPED | OUTPATIENT
Start: 2020-01-13 | End: 2020-02-28

## 2020-01-13 NOTE — TELEPHONE ENCOUNTER
hydrochlorothiazide        Last written prescription date: 10-        Last fill quantity: 90, # refills: 0        Last office visit: 9-        Future office visit: none        Is this a controlled substance?  No       Morenita Rudolph LPN .............1/13/2020  10:06 AM

## 2020-02-28 ENCOUNTER — HOSPITAL ENCOUNTER (OUTPATIENT)
Dept: GENERAL RADIOLOGY | Facility: OTHER | Age: 60
End: 2020-02-28
Attending: NURSE PRACTITIONER
Payer: COMMERCIAL

## 2020-02-28 ENCOUNTER — OFFICE VISIT (OUTPATIENT)
Dept: INTERNAL MEDICINE | Facility: OTHER | Age: 60
End: 2020-02-28
Attending: NURSE PRACTITIONER
Payer: COMMERCIAL

## 2020-02-28 VITALS
OXYGEN SATURATION: 97 % | TEMPERATURE: 96.8 F | HEIGHT: 63 IN | DIASTOLIC BLOOD PRESSURE: 80 MMHG | RESPIRATION RATE: 16 BRPM | SYSTOLIC BLOOD PRESSURE: 130 MMHG | BODY MASS INDEX: 35.47 KG/M2 | WEIGHT: 200.2 LBS | HEART RATE: 84 BPM

## 2020-02-28 DIAGNOSIS — G89.29 CHRONIC MIDLINE LOW BACK PAIN WITHOUT SCIATICA: ICD-10-CM

## 2020-02-28 DIAGNOSIS — M54.50 CHRONIC MIDLINE LOW BACK PAIN WITHOUT SCIATICA: ICD-10-CM

## 2020-02-28 DIAGNOSIS — K21.9 GASTROESOPHAGEAL REFLUX DISEASE WITHOUT ESOPHAGITIS: ICD-10-CM

## 2020-02-28 DIAGNOSIS — I10 ESSENTIAL HYPERTENSION: ICD-10-CM

## 2020-02-28 DIAGNOSIS — R73.9 HYPERGLYCEMIA: Primary | ICD-10-CM

## 2020-02-28 PROCEDURE — 72100 X-RAY EXAM L-S SPINE 2/3 VWS: CPT

## 2020-02-28 PROCEDURE — 99214 OFFICE O/P EST MOD 30 MIN: CPT | Performed by: NURSE PRACTITIONER

## 2020-02-28 RX ORDER — LISINOPRIL/HYDROCHLOROTHIAZIDE 10-12.5 MG
1 TABLET ORAL DAILY
Qty: 90 TABLET | Refills: 3 | Status: CANCELLED | OUTPATIENT
Start: 2020-02-28

## 2020-02-28 RX ORDER — LOSARTAN POTASSIUM AND HYDROCHLOROTHIAZIDE 12.5; 5 MG/1; MG/1
1 TABLET ORAL DAILY
Qty: 90 TABLET | Refills: 3 | Status: SHIPPED | OUTPATIENT
Start: 2020-02-28 | End: 2021-03-23

## 2020-02-28 RX ORDER — ATORVASTATIN CALCIUM 20 MG/1
20 TABLET, FILM COATED ORAL AT BEDTIME
Qty: 90 TABLET | Refills: 3 | Status: SHIPPED | OUTPATIENT
Start: 2020-02-28 | End: 2021-03-22

## 2020-02-28 RX ORDER — LISINOPRIL/HYDROCHLOROTHIAZIDE 10-12.5 MG
1 TABLET ORAL DAILY
Refills: 3 | COMMUNITY
Start: 2019-07-06 | End: 2020-02-28

## 2020-02-28 ASSESSMENT — PAIN SCALES - GENERAL: PAINLEVEL: SEVERE PAIN (7)

## 2020-02-28 ASSESSMENT — MIFFLIN-ST. JEOR: SCORE: 1448.35

## 2020-02-28 NOTE — PROGRESS NOTES
Subjective:  She is here today for chronic low back pain.  She states this is been occurring for many years but is noticed it is gotten worse over the past 2 years.  She did see a chiropractor and had about 10 sessions.  They did manipulation and ultrasound.  She only found minimal improvement.  He did do an x-ray and reported to her that she is shows degeneration and narrowing of the sacrum.  She has been doing ice, Aleve 2 pills daily most days and trying to do home stretches.  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  She does fine when she does heavy lifting that she feels a pressure in her low back.  Denies change in bowel and bladder function, lower extremity weakness and pain into her legs oral buttocks.  She has hypertension and GERD.  She needs refills of medications.  She is switching pharmacies.  Denies chest pain, chest pressure and shortness of breath.    Patient Active Problem List   Diagnosis     Elevated LFTs     HTN (hypertension)     Lupus (H)     Nicotine use disorder     Obesity     Disorder of bursae and tendons in shoulder region     Other affections of shoulder region, not elsewhere classified     Mixed hyperlipidemia     Hyperglycemia     Pulmonary nodules     Hepatic steatosis     Prediabetes     Gastroesophageal reflux disease without esophagitis     Mild intermittent asthma without complication     Past Medical History:   Diagnosis Date     Bacterial pneumonia     2/2/2010     Bitten by dog     9/2012,hospitalized     Bitten by dog     10/2/2012     Chronic sinusitis     Chronic Sinusitis     Nonrheumatic mitral valve insufficiency     2/2005,Trace Mitral Regurgitation, Echo.  Does not require SBE     Other shoulder lesions, left shoulder     L shoulder RTC tendonitis     Personal history of other medical treatment (CODE)     prophylaxis     Personal history of other medical  treatment (CODE)     03/11/2005,Echocardiogram 3/11/05 for family history of mitral valve and aortic valve replacement.  Trace mitral regurgitation.  Mitral valve appeared normal.  Tricuspid aortic valve without AI or AS.  No focal wall motion abnormalities.  Ejection fraction 72% 3/12/05.     Primary osteoarthritis of right knee     No Comments Provided     Tobacco use     quit 9/2012     Past Surgical History:   Procedure Laterality Date     ARTHROSCOPY KNEE      08/2001,Right, Dr. 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     ARTHROSCOPY SHOULDER      2011 / 2012,Left, supraspinatus repair     COLONOSCOPY  01/10/2013    hyperplastic, 1/10/23     ECHOCARDIOGRAM INTRAOPERATIVE IN OR      3/11/05,for family history of mitral valve and aortic valve replacement.  Trace mitral regurgitation.  Mitral valve appeared normal.  Tricuspid aortic valve without AI or AS.  No focal wall motion abnormalities.  Ejection fraction 72%     OTHER SURGICAL HISTORY      03/13/2006,[id number],PREMALIG/BENIGN SKIN LESION EXCISION,Excision of a benign verrucose keratosis with actinic damage and lentiginous change.     OTHER SURGICAL HISTORY      12/2006,.0,([place]) [place] SODIUM HYALONURATE PER INJECTION,Synvisc injection     TONSILLECTOMY      No Comments Provided     Social History     Socioeconomic History     Marital status:      Spouse name: Not on file     Number of children: Not on file     Years of education: Not on file     Highest education level: Not on file   Occupational History     Not on file   Social Needs     Financial resource strain: Not on file     Food insecurity:     Worry: Not on file     Inability: Not on file     Transportation needs:     Medical: Not on file     Non-medical: Not on file   Tobacco Use     Smoking status: Current Every Day Smoker     Packs/day: 0.75     Years: 45.00     Pack years: 33.75     Types: Cigarettes     Last attempt to quit: 10/1/2012     Years since quitting:  7.4     Smokeless tobacco: Never Used   Substance and Sexual Activity     Alcohol use: Yes     Alcohol/week: 3.0 standard drinks     Frequency: Monthly or less     Drug use: No     Sexual activity: Yes     Partners: Male   Lifestyle     Physical activity:     Days per week: Not on file     Minutes per session: Not on file     Stress: Not on file   Relationships     Social connections:     Talks on phone: Not on file     Gets together: Not on file     Attends Mandaen service: Not on file     Active member of club or organization: Not on file     Attends meetings of clubs or organizations: Not on file     Relationship status: Not on file     Intimate partner violence:     Fear of current or ex partner: Not on file     Emotionally abused: Not on file     Physically abused: Not on file     Forced sexual activity: Not on file   Other Topics Concern     Not on file   Social History Narrative    .  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      She has 3 grown children.    Lives on [address]. Quit smoking 9/2012    **pre upload 02/11/2013**     Family History   Problem Relation Age of Onset     Hyperlipidemia Mother         Hyperlipidemia,Hyperlipidemia     Other - See Comments Mother         Stroke,CVA 03/07     Heart Disease Mother         Heart Disease,aortic and mitral valve replacements, rheumatic fever     Diabetes Father         Diabetes     Heart Disease Sister         Heart Disease,Valvular heart disease and diffuse athersclerosis on brain MRI     Other - See Comments Sister         rheumatic fever     Other - See Comments Maternal Grandmother         Stroke,CVA     Prostate Cancer Paternal Grandfather         Cancer-prostate     Current Outpatient Medications   Medication Sig Dispense Refill     albuterol (PROAIR HFA/PROVENTIL HFA/VENTOLIN HFA) 108 (90 Base) MCG/ACT inhaler Inhale 1-2 puffs into the lungs every 4 hours as needed 1 Inhaler 11     atorvastatin (LIPITOR) 20 MG tablet Take  "1 tablet (20 mg) by mouth At Bedtime 90 tablet 3     losartan-hydrochlorothiazide (HYZAAR) 50-12.5 MG tablet Take 1 tablet by mouth daily 90 tablet 3     omeprazole (PRILOSEC) 20 MG DR capsule TAKE 1 CAPSULE BY MOUTH EVERY DAY BEFORE A MEAL 90 capsule 3     nicotine (NICODERM CQ) 14 MG/24HR 24 hr patch Place 1 patch onto the skin every 24 hours (Patient not taking: Reported on 2/28/2020) 30 patch 1     other medical supplies Magnesium spray as needed  0     Lisinopril      Review of Systems:  Review of Systems  See HPI  Objective:   /80 (BP Location: Right arm, Patient Position: Sitting, Cuff Size: Adult Regular)   Pulse 84   Temp 96.8  F (36  C) (Tympanic)   Resp 16   Ht 1.61 m (5' 3.39\")   Wt 90.8 kg (200 lb 3.2 oz)   SpO2 97%   Breastfeeding No   BMI 35.03 kg/m    Physical Exam  Pleasant female without acute distress.  Affect normal.  Alert and oriented x4.  Skin color pink.  Mucous membranes moist.  Lung fields clear to auscultation.  Cardiovascular regular rate and rhythm.  There is tenderness with palpation to the L4-L5 region especially slightly to the right.  No sacroiliac discomfort.  Normal strength, sensation, range of motion and DTRs of lower extremities.  Gait is stable.    Assessment:    ICD-10-CM    1. Hyperglycemia R73.9 atorvastatin (LIPITOR) 20 MG tablet   2. Essential hypertension I10 losartan-hydrochlorothiazide (HYZAAR) 50-12.5 MG tablet   3. Gastroesophageal reflux disease without esophagitis K21.9 omeprazole (PRILOSEC) 20 MG DR capsule   4. Chronic midline low back pain without sciatica M54.5 PHYSICAL THERAPY REFERRAL    G89.29 XR Lumbar Spine 2/3 Views       Plan:   1.  Medications are refilled.  She was last evaluated in September and had labs completed at that time.  Follow-up in the fall.  2.  Chronic midline low back pain.  Progressively getting worse over the years.  Recommend physical therapy.  Will obtain an x-ray of her lower back for a baseline.  If she develops " worsening of her symptoms to include radicular symptoms recommend follow-up otherwise we will plan to see her back in 4-6 weeks after completion of physical therapy.  May continue with the Aleve 2 pills once daily with food, ice and also recommended topical analgesic.    ANAYA Zuniga   2/28/2020  8:09 AM

## 2020-02-28 NOTE — NURSING NOTE
"Chief Complaint   Patient presents with     Back Pain       Initial /80 (BP Location: Right arm, Patient Position: Sitting, Cuff Size: Adult Regular)   Pulse 84   Temp 96.8  F (36  C) (Tympanic)   Resp 16   Ht 1.61 m (5' 3.39\")   Wt 90.8 kg (200 lb 3.2 oz)   SpO2 97%   Breastfeeding No   BMI 35.03 kg/m   Estimated body mass index is 35.03 kg/m  as calculated from the following:    Height as of this encounter: 1.61 m (5' 3.39\").    Weight as of this encounter: 90.8 kg (200 lb 3.2 oz).  Medication Reconciliation: complete  Berta Brooks LPN  "

## 2020-02-29 ASSESSMENT — ASTHMA QUESTIONNAIRES: ACT_TOTALSCORE: 23

## 2020-03-11 ENCOUNTER — HOSPITAL ENCOUNTER (OUTPATIENT)
Dept: PHYSICAL THERAPY | Facility: OTHER | Age: 60
Setting detail: THERAPIES SERIES
End: 2020-03-11
Attending: NURSE PRACTITIONER
Payer: COMMERCIAL

## 2020-03-11 ENCOUNTER — HEALTH MAINTENANCE LETTER (OUTPATIENT)
Age: 60
End: 2020-03-11

## 2020-03-11 DIAGNOSIS — G89.29 CHRONIC MIDLINE LOW BACK PAIN WITHOUT SCIATICA: ICD-10-CM

## 2020-03-11 DIAGNOSIS — M54.50 CHRONIC MIDLINE LOW BACK PAIN WITHOUT SCIATICA: ICD-10-CM

## 2020-03-11 PROCEDURE — 97112 NEUROMUSCULAR REEDUCATION: CPT | Mod: GP

## 2020-03-11 PROCEDURE — 97161 PT EVAL LOW COMPLEX 20 MIN: CPT | Mod: GP

## 2020-03-11 NOTE — PROGRESS NOTES
03/11/20 1500   General Information   Type of Visit Initial OP Ortho PT Evaluation   Start of Care Date 03/11/20   Referring Physician Rhona Doherty NP   Patient/Family Goals Statement decrease back pain   Orders Evaluate and Treat   Certification Required? No   Medical Diagnosis chronic midline low back pain without sciatica   Surgical/Medical history reviewed Yes  (R TKA 6 yrs ago; L shoulder sxs)   Body Part(s)   Body Part(s) Lumbar Spine/SI   Presentation and Etiology   Pertinent history of current problem (include personal factors and/or comorbidities that impact the POC) Pt reports chronic issues with back pain over last 23 or so years. Worse since last spring. 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 Went to chiro last spring and xrays showed some SI and low lumbar degeneration; then saw Rhona and she redid xrays. Sitting, prolonged sitting and walking bother.   Impairments A. Pain;H. Impaired gait   Functional Limitations perform activities of daily living;perform required work activities;perform desired leisure / sports activities   Symptom Location whole low back   How/Where did it occur From insidious onset;With repetition/overuse;From Degenerative Joint Disease   Onset date of current episode/exacerbation 03/01/19   Chronicity Chronic   Pain rating (0-10 point scale) Best (/10);Worst (/10)   Best (/10) 1-2/10   Worst (/10) 10/10   Pain quality A. Sharp;C. Aching;F. Stabbing   Frequency of pain/symptoms A. Constant   Pain/symptoms are: Worse during the day   Pain/symptoms exacerbated by A. Sitting;B. Walking;C. Lifting;D. Carrying;F. Nothing;I. Bending;K. Home tasks   Pain/symptoms eased by C. Rest;H. Cold;I. OTC medication(s)  (salonpas)   Progression of symptoms since onset: Worsened   Current / Previous Interventions   Diagnostic Tests: X-ray   X-ray Results Results   X-ray results sacralization L5   Prior Level of Function   Prior Level of Function-Mobility  "Independent   Current Level of Function   Current Community Support Family/friend caregiver   Patient role/employment history A. Employed   Employment Comments print shop at high school   Living environment House/townhome   Home/community accessibility 1 flight of stairs   Fall Risk Screen   Fall screen completed by PT   Have you fallen 2 or more times in the past year? No   Have you fallen and had an injury in the past year? No   Is patient a fall risk? No   Abuse Screen (yes response referral indicated)   Feels Unsafe at Home or Work/School no   Feels Threatened by Someone no   Does Anyone Try to Keep You From Having Contact with Others or Doing Things Outside Your Home? no   Physical Signs of Abuse Present no   Lumbar Spine/SI Objective Findings   Flexion ROM 3\" past knees   Extension ROM 5-10 degrees   Pelvic Screen left sacral base slightly posterior; neg FFT, L stork mild; equal leg length and pelvis   Hip Screen mild GIANA, neg FADIR   Hip Flexor Flexibility impaired   Piriformis Flexibility impaired   SLR negative   Crossover SLR negative   Segmental Mobility L5 FRS L   Palpation tender L SIJ and low lumbar facets   Planned Therapy Interventions   Planned Therapy Interventions bed mobility training;gait training;joint mobilization;manual therapy;neuromuscular re-education;ROM;strengthening;stretching   Planned Modality Interventions   Planned Modality Interventions Cryotherapy;Hot packs;Traction;Ultrasound   Clinical Impression   Criteria for Skilled Therapeutic Interventions Met yes, treatment indicated   PT Diagnosis impaired mobility and strength   Influenced by the following impairments flexibility, joint mobility, core and hip strength and endurance   Functional limitations due to impairments sitting, standing, transfers, walking   Clinical Presentation Stable/Uncomplicated   Clinical Decision Making (Complexity) Low complexity   Therapy Frequency 2 times/Week   Predicted Duration of Therapy " Intervention (days/wks) up to 6 months   Risk & Benefits of therapy have been explained Yes   Patient, Family & other staff in agreement with plan of care Yes   Clinical Impression Comments Pt demonstrates impaired lumbosacral mobility and will benefit from skilled PT services.   Education Assessment   Preferred Learning Style Listening;Reading;Demonstration   Barriers to Learning No barriers   ORTHO GOALS   PT Ortho Eval Goals 1;2;3   Ortho Goal 1   Goal Identifier mobility   Goal Description Pt will have improved lumbosacral mobility to allow sitting 15 minutes with pain less than 5/10.   Target Date 05/06/20   Ortho Goal 2   Goal Identifier strength   Goal Description Pt will have improved core and hip strength to allow standing 30 minutes with appropriate posture and pain less than 5/10.   Target Date 05/20/20   Ortho Goal 3   Goal Identifier HEP   Goal Description Pt will be independent and consistent with performance of a comprehensive home exercise program.   Target Date 06/03/20   Total Evaluation Time   PT Eval, Low Complexity Minutes (17113) 30

## 2020-03-16 ENCOUNTER — HOSPITAL ENCOUNTER (OUTPATIENT)
Dept: PHYSICAL THERAPY | Facility: OTHER | Age: 60
Setting detail: THERAPIES SERIES
End: 2020-03-16
Attending: NURSE PRACTITIONER
Payer: COMMERCIAL

## 2020-03-16 PROCEDURE — 97112 NEUROMUSCULAR REEDUCATION: CPT | Mod: GP

## 2020-03-16 PROCEDURE — 97110 THERAPEUTIC EXERCISES: CPT | Mod: GP

## 2020-03-18 ENCOUNTER — HOSPITAL ENCOUNTER (OUTPATIENT)
Dept: PHYSICAL THERAPY | Facility: OTHER | Age: 60
Setting detail: THERAPIES SERIES
End: 2020-03-18
Attending: NURSE PRACTITIONER
Payer: COMMERCIAL

## 2020-03-18 PROCEDURE — 97112 NEUROMUSCULAR REEDUCATION: CPT | Mod: GP

## 2020-03-18 PROCEDURE — 97110 THERAPEUTIC EXERCISES: CPT | Mod: GP

## 2020-10-13 ENCOUNTER — TELEPHONE (OUTPATIENT)
Dept: INTERNAL MEDICINE | Facility: OTHER | Age: 60
End: 2020-10-13

## 2020-10-13 NOTE — TELEPHONE ENCOUNTER
Spoke with patient and transferred to scheduling to make an appointment for paper work. Morenita Rudolph LPN .............10/13/2020  11:04 AM

## 2020-10-16 ENCOUNTER — TELEPHONE (OUTPATIENT)
Dept: INTERNAL MEDICINE | Facility: OTHER | Age: 60
End: 2020-10-16

## 2020-10-16 NOTE — LETTER
My Asthma Action Plan    Name: Charleen Huddleston   YOB: 1960  Date: 10/16/2020   My doctor: Elisa Doherty NP   My clinic: [facility]        My Rescue Medicine:   Albuterol inhaler (Proair/Ventolin/Proventil HFA)  2-4 puffs EVERY 4 HOURS as needed. Use a spacer if recommended by your provider.   My Asthma Severity:   Intermittent / Exercise Induced  Know your asthma triggers          GREEN ZONE   Good Control    I feel good    No cough or wheeze    Can work, sleep and play without asthma symptoms       Take your asthma control medicine every day.     1. If exercise triggers your asthma, take your rescue medication    15 minutes before exercise or sports, and    During exercise if you have asthma symptoms  2. Spacer to use with inhaler: If you have a spacer, make sure to use it with your inhaler             YELLOW ZONE Getting Worse  I have ANY of these:    I do not feel good    Cough or wheeze    Chest feels tight    Wake up at night   1. Keep taking your Green Zone medications  2. Start taking your rescue medicine:    every 20 minutes for up to 1 hour. Then every 4 hours for 24-48 hours.  3. If you stay in the Yellow Zone for more than 12-24 hours, contact your doctor.  4. If you do not return to the Green Zone in 12-24 hours or you get worse, start taking your oral steroid medicine if prescribed by your provider.           RED ZONE Medical Alert - Get Help  I have ANY of these:    I feel awful    Medicine is not helping    Breathing getting harder    Trouble walking or talking    Nose opens wide to breathe       1. Take your rescue medicine NOW  2. If your provider has prescribed an oral steroid medicine, start taking it NOW  3. Call your doctor NOW  4. If you are still in the Red Zone after 20 minutes and you have not reached your doctor:    Take your rescue medicine again and    Call 911 or go to the emergency room right away    See your regular doctor within 2 weeks  of an Emergency Room or Urgent Care visit for follow-up treatment.          Annual Reminders:  Meet with Asthma Educator,  Flu Shot in the Fall, consider Pneumonia Vaccination for patients with asthma (aged 19 and older).    Pharmacy: [id number] - [place], [place] - [address]    Electronically signed by Elisa Doherty NP   Date: 10/16/20                    Asthma Triggers  How To Control Things That Make Your Asthma Worse    Triggers are things that make your asthma worse.  Look at the list below to help you find your triggers and   what you can do about them. You can help prevent asthma flare-ups by staying away from your triggers.      Trigger                                                          What you can do   Cigarette Smoke  Tobacco smoke can make asthma worse. Do not allow smoking in your home, car or around you.  Be sure no one smokes at a child s day care or school.  If you smoke, ask your health care provider for ways to help you quit.  Ask family members to quit too.  Ask your health care provider for a referral to Quit Plan to help you quit smoking, or call 7-773-036-PLAN.     Colds, Flu, Bronchitis  These are common triggers of asthma. Wash your hands often.  Don t touch your eyes, nose or mouth.  Get a flu shot every year.     Dust Mites  These are tiny bugs that live in cloth or carpet. They are too small to see. Wash sheets and blankets in hot water every week.   Encase pillows and mattress in dust mite proof covers.  Avoid having carpet if you can. If you have carpet, vacuum weekly.   Use a dust mask and HEPA vacuum.   Pollen and Outdoor Mold  Some people are allergic to trees, grass, or weed pollen, or molds. Try to keep your windows closed.  Limit time out doors when pollen count is high.   Ask you health care provider about taking medicine during allergy season.     Animal Dander  Some people are allergic to skin flakes, urine or saliva from pets with fur  or feathers. Keep pets with fur or feathers out of your home.    If you can t keep the pet outdoors, then keep the pet out of your bedroom.  Keep the bedroom door closed.  Keep pets off cloth furniture and away from stuffed toys.     Mice, Rats, and Cockroaches  Some people are allergic to the waste from these pests.   Cover food and garbage.  Clean up spills and food crumbs.  Store grease in the refrigerator.   Keep food out of the bedroom.   Indoor Mold  This can be a trigger if your home has high moisture. Fix leaking faucets, pipes, or other sources of water.   Clean moldy surfaces.  Dehumidify basement if it is damp and smelly.   Smoke, Strong Odors, and Sprays  These can reduce air quality. Stay away from strong odors and sprays, such as perfume, powder, hair spray, paints, smoke incense, paint, cleaning products, candles and new carpet.   Exercise or Sports  Some people with asthma have this trigger. Be active!  Ask your doctor about taking medicine before sports or exercise to prevent symptoms.    Warm up for 5-10 minutes before and after sports or exercise.     Other Triggers of Asthma  Cold air:  Cover your nose and mouth with a scarf.  Sometimes laughing or crying can be a trigger.  Some medicines and food can trigger asthma.

## 2020-10-19 ENCOUNTER — OFFICE VISIT (OUTPATIENT)
Dept: INTERNAL MEDICINE | Facility: OTHER | Age: 60
End: 2020-10-19
Attending: NURSE PRACTITIONER
Payer: COMMERCIAL

## 2020-10-19 VITALS — RESPIRATION RATE: 18 BRPM | TEMPERATURE: 97.6 F

## 2020-10-19 DIAGNOSIS — R73.03 PREDIABETES: ICD-10-CM

## 2020-10-19 DIAGNOSIS — L93.0 LUPUS ERYTHEMATOSUS, UNSPECIFIED FORM: Primary | ICD-10-CM

## 2020-10-19 DIAGNOSIS — J45.20 MILD INTERMITTENT ASTHMA WITHOUT COMPLICATION: ICD-10-CM

## 2020-10-19 PROCEDURE — 99213 OFFICE O/P EST LOW 20 MIN: CPT | Performed by: NURSE PRACTITIONER

## 2020-10-19 ASSESSMENT — PAIN SCALES - GENERAL: PAINLEVEL: NO PAIN (0)

## 2020-10-19 NOTE — NURSING NOTE
Chief Complaint   Patient presents with     Other     paper work       Medication Reconciliation complete.   Morenita Rudolph LPN  ..................10/19/2020   9:52 AM

## 2020-10-19 NOTE — PROGRESS NOTES
Subjective:  She is here today for completion of paperwork for her place of employee.  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  Her potential plans to farm all of the Para's to different duties such as riding the bus with children, providing  to children and also working in other positions where she would be in closer contact with other employees and those outside of the school district.  She is quite concerned because her  has bladder cancer and she also has increased risk with her mild intermittent asthma, prediabetes and lupus.  She is very concerned of melinda COVID-19.  Her lupus has been in remission since the 1980s.  She was told by the rheumatologist she did not need ongoing management as long as she remains stable.  She states that stress seems to flare her symptoms of joint pain with lupus but she has learned how to manage stress so that has not been a factor.  Her asthma has been well controlled.  She is working to improve her hyperglycemia.  Her  has bladder cancer and there is not really anything she can do to change reducing his risk of melinda COVID-19 besides limiting contacts with others who potentially have illness.    Patient Active Problem List   Diagnosis     Elevated LFTs     HTN (hypertension)     Lupus (H)     Nicotine use disorder     Obesity     Disorder of bursae and tendons in shoulder region     Other affections of shoulder region, not elsewhere classified     Mixed hyperlipidemia     Hyperglycemia     Pulmonary nodules     Hepatic steatosis     Prediabetes     Gastroesophageal reflux disease without esophagitis     Mild intermittent asthma without complication     Past Medical History:   Diagnosis Date     Bacterial pneumonia     2/2/2010     Bitten by dog     9/2012,hospitalized     Bitten by dog     10/2/2012     Chronic sinusitis     Chronic Sinusitis     Nonrheumatic mitral valve insufficiency     2/2005,Trace Mitral Regurgitation,  Echo.  Does not require SBE     Other shoulder lesions, left shoulder     L shoulder RTC tendonitis     Personal history of other medical treatment (CODE)     prophylaxis     Personal history of other medical treatment (CODE)     03/11/2005,Echocardiogram 3/11/05 for family history of mitral valve and aortic valve replacement.  Trace mitral regurgitation.  Mitral valve appeared normal.  Tricuspid aortic valve without AI or AS.  No focal wall motion abnormalities.  Ejection fraction 72% 3/12/05.     Primary osteoarthritis of right knee     No Comments Provided     Tobacco use     quit 9/2012     Past Surgical History:   Procedure Laterality Date     ARTHROSCOPY KNEE      08/2001,Right, Dr. 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     ARTHROSCOPY SHOULDER      2011 / 2012,Left, supraspinatus repair     COLONOSCOPY  01/10/2013    hyperplastic, 1/10/23     ECHOCARDIOGRAM INTRAOPERATIVE IN OR      3/11/05,for family history of mitral valve and aortic valve replacement.  Trace mitral regurgitation.  Mitral valve appeared normal.  Tricuspid aortic valve without AI or AS.  No focal wall motion abnormalities.  Ejection fraction 72%     OTHER SURGICAL HISTORY      03/13/2006,QNU673,PREMALIG/BENIGN SKIN LESION EXCISION,Excision of a benign verrucose keratosis with actinic damage and lentiginous change.     OTHER SURGICAL HISTORY      12/2006,.0,(IA) PA SODIUM HYALONURATE PER INJECTION,Synvisc injection     TONSILLECTOMY      No Comments Provided     Social History     Socioeconomic History     Marital status:      Spouse name: Not on file     Number of children: Not on file     Years of education: Not on file     Highest education level: Not on file   Occupational History     Not on file   Social Needs     Financial resource strain: Not on file     Food insecurity     Worry: Not on file     Inability: Not on file     Transportation needs     Medical: Not on file     Non-medical: Not on  file   Tobacco Use     Smoking status: Current Every Day Smoker     Packs/day: 0.75     Years: 45.00     Pack years: 33.75     Types: Cigarettes     Last attempt to quit: 10/1/2012     Years since quittin.0     Smokeless tobacco: Never Used   Substance and Sexual Activity     Alcohol use: Yes     Alcohol/week: 3.0 standard drinks     Frequency: Monthly or less     Drug use: No     Sexual activity: Yes     Partners: Male   Lifestyle     Physical activity     Days per week: Not on file     Minutes per session: Not on file     Stress: Not on file   Relationships     Social connections     Talks on phone: Not on file     Gets together: Not on file     Attends Evangelical service: Not on file     Active member of club or organization: Not on file     Attends meetings of clubs or organizations: Not on file     Relationship status: Not on file     Intimate partner violence     Fear of current or ex partner: Not on file     Emotionally abused: Not on file     Physically abused: Not on file     Forced sexual activity: Not on file   Other Topics Concern     Not on file   Social History Narrative    .  Works at the school district.   works at [employer].      She has 3 grown children.    [place] [address]. Quit smoking 2012    **pre upload 2013**     Family History   Problem Relation Age of Onset     Hyperlipidemia Mother         Hyperlipidemia,Hyperlipidemia     Other - See Comments Mother         Stroke,CVA      Heart Disease Mother         Heart Disease,aortic and mitral valve replacements, rheumatic fever     Diabetes Father         Diabetes     Heart Disease Sister         Heart Disease,Valvular heart disease and diffuse athersclerosis on brain MRI     Other - See Comments Sister         rheumatic fever     Other - See Comments Maternal Grandmother         Stroke,CVA     Prostate Cancer Paternal Grandfather         Cancer-prostate     Current Outpatient Medications   Medication Sig Dispense  Refill     albuterol (PROAIR HFA/PROVENTIL HFA/VENTOLIN HFA) 108 (90 Base) MCG/ACT inhaler Inhale 1-2 puffs into the lungs every 4 hours as needed 1 Inhaler 11     atorvastatin (LIPITOR) 20 MG tablet Take 1 tablet (20 mg) by mouth At Bedtime 90 tablet 3     losartan-hydrochlorothiazide (HYZAAR) 50-12.5 MG tablet Take 1 tablet by mouth daily 90 tablet 3     nicotine (NICODERM CQ) 14 MG/24HR 24 hr patch Place 1 patch onto the skin every 24 hours (Patient not taking: Reported on 2/28/2020) 30 patch 1     omeprazole (PRILOSEC) 20 MG DR capsule TAKE 1 CAPSULE BY MOUTH EVERY DAY BEFORE A MEAL 90 capsule 3     other medical supplies Magnesium spray as needed  0     Lisinopril      Review of Systems:  Review of Systems  See above  Objective:   Temp 97.6  F (36.4  C) (Tympanic)   Resp 18   Physical Exam  Pleasant female no acute distress.  Affect normal.  Alert and oriented x4.  Speech is fluent.  Assessment:    ICD-10-CM    1. Lupus erythematosus, unspecified form  L93.0    2. Mild intermittent asthma without complication  J45.20    3. Prediabetes  R73.03        Plan:   Patient does have realistic concerns and fears about melinda COVID-19.  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  She is not exposed to outside contacts or others within the school district with her current duties.  Paperwork was completed today to help her to be able to maintain her current job duties that allow her to have a lower risk of melinda COVID-19.  See scanned paperwork.  Reminded patient that she is due for chronic disease management and physical.  She would like to hold off on this until after the winter due to the risk of COVID-19.  I also offered for her to follow-up with rheumatology and she declined since her lupus has been stable for many years.  Risk  associated with disease progression of lupus is reviewed and discussed with patient and she continues to decline rheumatology consult.    20 minutes spent with patient ant record review for paperwork completion    ANAYA Zuniga   10/19/2020  10:16 AM

## 2021-02-24 NOTE — PROGRESS NOTES
Outpatient Physical Therapy Discharge Note     Patient: Charleen Huddleston  : 1960    Beginning/End Dates of Reporting Period:  3/11/2020 to 2021    Referring Provider: Rhona Doherty NP    Therapy Diagnosis: impaired mobility and strength       Client Self Report: The past few days the sharp radiating pain was so much better. Slept so good Monday night and did ice when she got home. The new stretch is going well and likes being able to do it at work. Rates pain 2-3/10 even after being on her feet a lot today.     Objective Measurements:  Objective Measure: segmental mobility  Details: L4-5 FRS R  Objective Measure: pelvic screen  Details: L stork (improved); Neg FFT; left sacral base post, L MING post/inf                               Outcome Measures (most recent score):  Ana STarT    Ana STarT         Goals:  Goal Identifier mobility   Goal Description Pt will have improved lumbosacral mobility to allow sitting 15 minutes with pain less than 5/10.   Target Date 20   Date Met      Progress:     Goal Identifier strength   Goal Description Pt will have improved core and hip strength to allow standing 30 minutes with appropriate posture and pain less than 5/10.   Target Date 20   Date Met      Progress:     Goal Identifier HEP   Goal Description Pt will be independent and consistent with performance of a comprehensive home exercise program.   Target Date 20   Date Met      Progress:       Progress Toward Goals:   Not assessed this period.          Plan:  Discharge from therapy.    Discharge:    Reason for Discharge: Patient chooses to discontinue therapy.  Patient has failed to schedule further appointments.    Equipment Issued: n/a    Discharge Plan: Patient to continue home program.

## 2021-03-18 ENCOUNTER — OFFICE VISIT (OUTPATIENT)
Dept: INTERNAL MEDICINE | Facility: OTHER | Age: 61
End: 2021-03-18
Attending: NURSE PRACTITIONER
Payer: COMMERCIAL

## 2021-03-18 VITALS
DIASTOLIC BLOOD PRESSURE: 80 MMHG | TEMPERATURE: 97.6 F | OXYGEN SATURATION: 96 % | RESPIRATION RATE: 16 BRPM | HEART RATE: 75 BPM | SYSTOLIC BLOOD PRESSURE: 150 MMHG

## 2021-03-18 DIAGNOSIS — E83.42 HYPOMAGNESEMIA: ICD-10-CM

## 2021-03-18 DIAGNOSIS — R00.0 RAPID HEART RATE: ICD-10-CM

## 2021-03-18 DIAGNOSIS — F17.200 NICOTINE USE DISORDER: ICD-10-CM

## 2021-03-18 DIAGNOSIS — R03.0 ELEVATED BP WITHOUT DIAGNOSIS OF HYPERTENSION: Primary | ICD-10-CM

## 2021-03-18 DIAGNOSIS — E78.2 MIXED HYPERLIPIDEMIA: ICD-10-CM

## 2021-03-18 LAB
ALBUMIN SERPL-MCNC: 4.4 G/DL (ref 3.5–5.7)
ALP SERPL-CCNC: 92 U/L (ref 34–104)
ALT SERPL W P-5'-P-CCNC: 99 U/L (ref 7–52)
ANION GAP SERPL CALCULATED.3IONS-SCNC: 9 MMOL/L (ref 3–14)
AST SERPL W P-5'-P-CCNC: 93 U/L (ref 13–39)
BASOPHILS # BLD AUTO: 0 10E9/L (ref 0–0.2)
BASOPHILS NFR BLD AUTO: 0.7 %
BILIRUB SERPL-MCNC: 0.9 MG/DL (ref 0.3–1)
BUN SERPL-MCNC: 16 MG/DL (ref 7–25)
CALCIUM SERPL-MCNC: 9.9 MG/DL (ref 8.6–10.3)
CHLORIDE SERPL-SCNC: 99 MMOL/L (ref 98–107)
CHOLEST SERPL-MCNC: 187 MG/DL
CO2 SERPL-SCNC: 28 MMOL/L (ref 21–31)
CREAT SERPL-MCNC: 0.77 MG/DL (ref 0.6–1.2)
DIFFERENTIAL METHOD BLD: ABNORMAL
EOSINOPHIL # BLD AUTO: 0.1 10E9/L (ref 0–0.7)
EOSINOPHIL NFR BLD AUTO: 1.7 %
ERYTHROCYTE [DISTWIDTH] IN BLOOD BY AUTOMATED COUNT: 12.5 % (ref 10–15)
GFR SERPL CREATININE-BSD FRML MDRD: 76 ML/MIN/{1.73_M2}
GLUCOSE SERPL-MCNC: 108 MG/DL (ref 70–105)
HCT VFR BLD AUTO: 44.3 % (ref 35–47)
HDLC SERPL-MCNC: 75 MG/DL (ref 23–92)
HGB BLD-MCNC: 15.2 G/DL (ref 11.7–15.7)
IMM GRANULOCYTES # BLD: 0 10E9/L (ref 0–0.4)
IMM GRANULOCYTES NFR BLD: 0.2 %
LDLC SERPL CALC-MCNC: 93 MG/DL
LYMPHOCYTES # BLD AUTO: 1.4 10E9/L (ref 0.8–5.3)
LYMPHOCYTES NFR BLD AUTO: 23 %
MAGNESIUM SERPL-MCNC: 1.6 MG/DL (ref 1.9–2.7)
MCH RBC QN AUTO: 33.2 PG (ref 26.5–33)
MCHC RBC AUTO-ENTMCNC: 34.3 G/DL (ref 31.5–36.5)
MCV RBC AUTO: 97 FL (ref 78–100)
MONOCYTES # BLD AUTO: 0.5 10E9/L (ref 0–1.3)
MONOCYTES NFR BLD AUTO: 7.7 %
NEUTROPHILS # BLD AUTO: 3.9 10E9/L (ref 1.6–8.3)
NEUTROPHILS NFR BLD AUTO: 66.7 %
NONHDLC SERPL-MCNC: 112 MG/DL
PLATELET # BLD AUTO: 87 10E9/L (ref 150–450)
POTASSIUM SERPL-SCNC: 4 MMOL/L (ref 3.5–5.1)
PROT SERPL-MCNC: 7.3 G/DL (ref 6.4–8.9)
RBC # BLD AUTO: 4.58 10E12/L (ref 3.8–5.2)
SODIUM SERPL-SCNC: 136 MMOL/L (ref 134–144)
TRIGL SERPL-MCNC: 95 MG/DL
TSH SERPL DL<=0.05 MIU/L-ACNC: 3.66 IU/ML (ref 0.34–5.6)
WBC # BLD AUTO: 5.9 10E9/L (ref 4–11)

## 2021-03-18 PROCEDURE — 84443 ASSAY THYROID STIM HORMONE: CPT | Mod: ZL | Performed by: NURSE PRACTITIONER

## 2021-03-18 PROCEDURE — 80061 LIPID PANEL: CPT | Mod: ZL | Performed by: NURSE PRACTITIONER

## 2021-03-18 PROCEDURE — 85025 COMPLETE CBC W/AUTO DIFF WBC: CPT | Mod: ZL | Performed by: NURSE PRACTITIONER

## 2021-03-18 PROCEDURE — 93000 ELECTROCARDIOGRAM COMPLETE: CPT | Performed by: INTERNAL MEDICINE

## 2021-03-18 PROCEDURE — 80053 COMPREHEN METABOLIC PANEL: CPT | Mod: ZL | Performed by: NURSE PRACTITIONER

## 2021-03-18 PROCEDURE — 36415 COLL VENOUS BLD VENIPUNCTURE: CPT | Mod: ZL | Performed by: NURSE PRACTITIONER

## 2021-03-18 PROCEDURE — 83735 ASSAY OF MAGNESIUM: CPT | Mod: ZL | Performed by: NURSE PRACTITIONER

## 2021-03-18 PROCEDURE — 99406 BEHAV CHNG SMOKING 3-10 MIN: CPT | Performed by: NURSE PRACTITIONER

## 2021-03-18 PROCEDURE — 99214 OFFICE O/P EST MOD 30 MIN: CPT | Performed by: NURSE PRACTITIONER

## 2021-03-18 ASSESSMENT — ENCOUNTER SYMPTOMS
SHORTNESS OF BREATH: 0
CHEST TIGHTNESS: 1
PALPITATIONS: 1
BACK PAIN: 0
NERVOUS/ANXIOUS: 1

## 2021-03-18 ASSESSMENT — PAIN SCALES - GENERAL: PAINLEVEL: NO PAIN (0)

## 2021-03-18 NOTE — PROGRESS NOTES
troy Huddleston  : 1960 Age: 61 year old Sex: female MRN: [id number]    CC:   Chief Complaint   Patient presents with     Hypertension      SARAH Score:    SARAH-7 SCORE 2018   Total Score 0        PHQ-2 Score:     PHQ-2 (  [employer]) 3/18/2021 10/19/2020   Q1: Little interest or pleasure in doing things 0 0   Q2: Feeling down, depressed or hopeless 0 0   PHQ-2 Score 0 0         Tobacco Use      Smoking status: Current Every Day Smoker        Packs/day: 0.75        Years: 45.00        Pack years: 33.75        Types: Cigarettes        Quit date: 10/1/2012        Years since quittin.4      Smokeless tobacco: Never Used    NURSE'S NOTES:    Nursing Notes:   Esther Fleming LPN  3/18/2021  3:11 PM  Signed  Chief Complaint   Patient presents with     Hypertension   patient presents to the clinic today for blood pressure and pulse being high. Patient states that this am her pulse went to 140 and 150 level that lasted for over 20 minutes. Blood pressure was 161/83 this afternoon    Medication Reconciliation: completed   Esther Fleming LPN  3/18/2021 2:53 PM      Nursing note reviewed with patient.  Accuracy and completeness verified.    Charleen ERIC SellersKaro also presents with:    Patient Active Problem List   Diagnosis     Elevated LFTs     HTN (hypertension)     Lupus (H)     Nicotine use disorder     Obesity     Disorder of bursae and tendons in shoulder region     Other affections of shoulder region, not elsewhere classified     Mixed hyperlipidemia     Hyperglycemia     Pulmonary nodules     Hepatic steatosis     Prediabetes     Gastroesophageal reflux disease without esophagitis     Mild intermittent asthma without complication     HPI:   Patient is a 61-year-old female who presents to the clinic with concerns of elevated blood pressure and a pounding rapid heartbeat.  She reports over the past 1-1/2 years, she has had a few episodes.  Last episode prior to the most recent was in November.  This morning  she reports that her heart rate just starts pounding like it is pounding out of her chest, she watched as her heart rate went from 76 to 150's for 20 minutes.  Reports hot flashes and diaphoresis with episodes. 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  Reports pounding in her ears with episodes. Denies any known triggers, unsure if anxiety or cardiac related. Reports only 1 cup of coffee per day, no other caffeine.  Is currently on Hyzaar for BP control.     Patient is still smoking , 1 cigarette in a.m. and 4-5 in evening.    SUBJECTIVE:                                                      Current Code Status:  No Order    REVIEW OF SYSTEMS:    Review of Systems   Eyes:        Overdue for eye exam   Respiratory: Positive for chest tightness. Negative for shortness of breath.    Cardiovascular: Positive for chest pain and palpitations.        Heavy chest pressure, rapid heart rate for 20 minutes, HR 140s-150s, BP elevated 161/83 during this episode. Last episode was November, 2020.   Musculoskeletal: Negative for back pain.   Psychiatric/Behavioral: The patient is nervous/anxious.    All other systems reviewed and are negative.    Problem List/PMH: Reviewed in EMR, and made relevant updates today.  Medications: Reviewed in EMR, and made relevant updates today.  Allergies: Reviewed in EMR, and made relevant updates today.    OBJECTIVE:                                                      BP (!) 150/80 (BP Location: Right arm, Patient Position: Sitting, Cuff Size: Adult Large)   Pulse 75   Temp 97.6  F (36.4  C) (Tympanic)   Resp 16   SpO2 96%     Current Pain Score: No Pain (0)     Physical Exam  Vitals signs and nursing note reviewed.   Constitutional:       Appearance: Normal appearance.   HENT:      Head: Normocephalic and atraumatic.      Mouth/Throat:      Mouth: Mucous membranes are moist.      Pharynx: Oropharynx is clear.   Eyes:      Extraocular Movements: Extraocular  movements intact.      Conjunctiva/sclera: Conjunctivae normal.      Pupils: Pupils are equal, round, and reactive to light.   Neck:      Musculoskeletal: Normal range of motion and neck supple.   Cardiovascular:      Rate and Rhythm: Normal rate and regular rhythm.      Pulses: Normal pulses.      Heart sounds: Normal heart sounds.   Pulmonary:      Effort: Pulmonary effort is normal.      Breath sounds: Normal breath sounds.   Abdominal:      General: Bowel sounds are normal.      Palpations: Abdomen is soft.   Musculoskeletal: Normal range of motion.   Skin:     General: Skin is warm and dry.   Neurological:      Mental Status: She is alert and oriented to person, place, and time. Mental status is at baseline.   Psychiatric:         Mood and Affect: Mood normal.         Thought Content: Thought content normal.         Judgment: Judgment normal.       BP Readings from Last 3 Encounters:   03/18/21 (!) 150/80   02/28/20 130/80   09/18/19 (!) 140/78        Vitals:        The 10-year ASCVD risk score (Efra ROSA Jr., et al., 2013) is: 11.7%    Values used to calculate the score:      Age: 61 years      Sex: Female      Is Non- : No      Diabetic: No      Tobacco smoker: Yes      Systolic Blood Pressure: 150 mmHg      Is BP treated: Yes      HDL Cholesterol: 75 mg/dL      Total Cholesterol: 187 mg/dL    Diagnostics Completed at this Visit:    Results for orders placed or performed in visit on 03/18/21   Magnesium     Status: Abnormal   Result Value Ref Range    Magnesium 1.6 (L) 1.9 - 2.7 mg/dL   Thyrotropin GH     Status: None   Result Value Ref Range    Thyrotropin 3.66 0.34 - 5.60 IU/mL   CBC with platelets differential     Status: Abnormal   Result Value Ref Range    WBC 5.9 4.0 - 11.0 10e9/L    RBC Count 4.58 3.8 - 5.2 10e12/L    Hemoglobin 15.2 11.7 - 15.7 g/dL    Hematocrit 44.3 35.0 - 47.0 %    MCV 97 78 - 100 fl    MCH 33.2 (H) 26.5 - 33.0 pg    MCHC 34.3 31.5 - 36.5 g/dL    RDW 12.5 10.0  - 15.0 %    Platelet Count 87 (L) 150 - 450 10e9/L    Diff Method Automated Method     % Neutrophils 66.7 %    % Lymphocytes 23.0 %    % Monocytes 7.7 %    % Eosinophils 1.7 %    % Basophils 0.7 %    % Immature Granulocytes 0.2 %    Absolute Neutrophil 3.9 1.6 - 8.3 10e9/L    Absolute Lymphocytes 1.4 0.8 - 5.3 10e9/L    Absolute Monocytes 0.5 0.0 - 1.3 10e9/L    Absolute Eosinophils 0.1 0.0 - 0.7 10e9/L    Absolute Basophils 0.0 0.0 - 0.2 10e9/L    Abs Immature Granulocytes 0.0 0 - 0.4 10e9/L   Comprehensive metabolic panel     Status: Abnormal   Result Value Ref Range    Sodium 136 134 - 144 mmol/L    Potassium 4.0 3.5 - 5.1 mmol/L    Chloride 99 98 - 107 mmol/L    Carbon Dioxide 28 21 - 31 mmol/L    Anion Gap 9 3 - 14 mmol/L    Glucose 108 (H) 70 - 105 mg/dL    Urea Nitrogen 16 7 - 25 mg/dL    Creatinine 0.77 0.60 - 1.20 mg/dL    GFR Estimate 76 >60 mL/min/[1.73_m2]    GFR Estimate If Black >90 >60 mL/min/[1.73_m2]    Calcium 9.9 8.6 - 10.3 mg/dL    Bilirubin Total 0.9 0.3 - 1.0 mg/dL    Albumin 4.4 3.5 - 5.7 g/dL    Protein Total 7.3 6.4 - 8.9 g/dL    Alkaline Phosphatase 92 34 - 104 U/L    ALT 99 (H) 7 - 52 U/L    AST 93 (H) 13 - 39 U/L   Lipid Profile     Status: None   Result Value Ref Range    Cholesterol 187 <200 mg/dL    Triglycerides 95 <150 mg/dL    HDL Cholesterol 75 23 - 92 mg/dL    LDL Cholesterol Calculated 93 <100 mg/dL    Non HDL Cholesterol 112 <130 mg/dL        ASSESSMENT AND PLAN:    Elevated BP without diagnosis of hypertension  - Lipid Profile, within normal range  - Comprehensive metabolic panel, elevated ALT at 99 and AST at 93 otherwise unremarkable  - CBC with platelets differential, MCH slightly elevated and platelets are low at 87  - Thyrotropin GH, in normal range today at 3.66  - Echo Stress Echocardiogram, future  - Magnesium, Level is low today. Rx for supplement sent in to her pharmacy. Recheck this in 3 months.  - magnesium oxide (MAG-OX) 400 (241.3 Mg) MG tablet  Dispense: 90  tablet; Refill: 0    Rapid heart rate  - Lipid Profile, within normal range  - Comprehensive metabolic panel, elevated ALT at 99 and AST at 93 otherwise unremarkable  - CBC with platelets differential, MCH slightly elevated and platelets are low at 87  - Thyrotropin GH, in normal range today at 3.66  - Echo Stress Echocardiogram, future  - Magnesium, Level is low today. Rx for supplement sent in to her pharmacy. Recheck this in 3 months.  - magnesium oxide (MAG-OX) 400 (241.3 Mg) MG tablet  Dispense: 90 tablet; Refill: 0    Nicotine use disorder  Spoke with patient regarding options for smoking cessation.  Discussed the use of : Nicotine patch, Nicotine gum, Nicotine nasal inhaler and [place].   Patient chooses to try to stop smoking.  - Greater than 3 minutes were spent on smoking cessation including encouragement to reduce cigarette use and discussion of modalities to assist with this.  - Cessation was encouraged in order to improve pain, reduce mortality, improve quality of life, and long term outcomes.    Mixed hyperlipidemia  - Magnesium, Level is low today. Rx for supplement sent in to her pharmacy. Recheck this in 3 months.  - magnesium oxide (MAG-OX) 400 (241.3 Mg) MG tablet  Dispense: 90 tablet; Refill: 0     Hypomagnesemia  Level is low today. Rx for supplement sent in to her pharmacy. Recheck this in 3 months.  - magnesium oxide (MAG-OX) 400 (241.3 Mg) MG tablet  Dispense: 90 tablet; Refill: 0     I explained my diagnostic considerations and recommendations to the patient, who voiced understanding and agreement with the treatment plan. All questions were answered. We discussed potential side effects of any prescribed or recommended therapies, as well as expectations for response to treatments.    FOLLOW-UP:    Return in about 3 months (around 6/18/2021) for Lab Work, Recheck. Recommend following up with PCP if she has any further concerns or worsening of symptoms or ED presentation if symptoms present  again.  Recommended stop smoking, dietary improvement and daily exercise.    Clinic : [phone]  Appointment line: [phone]     SMITHA Cain, AGNP-C  Internal Medicine  03/22/2021 1:22 PM  _________________________________________________________________________________________  Disclaimer:  This note consists of words and symbols derived from keyboarding, dictation, or using voice recognition software. As a result, there may be errors in the script that have gone undetected. Please consider this when interpreting information found in this note. Please contact me if there are any concerns regarding the accuracy of the dictation.     Total time spent with this patient was 45 minutes which included chart review, visualization and interpretation of labs and/or images, time spent with patient, and documentation.    In an effort to reduce environmental footprint, your After Visit Summary for today's visit will be available on [employer] for your review.

## 2021-03-18 NOTE — NURSING NOTE
Chief Complaint   Patient presents with     Hypertension   patient presents to the clinic today for blood pressure and pulse being high. Patient states that this am her pulse went to 140 and 150 level that lasted for over 20 minutes. Blood pressure was 161/83 this afternoon    Medication Reconciliation: completed   Esther Fleming LPN  3/18/2021 2:53 PM

## 2021-03-22 DIAGNOSIS — I10 ESSENTIAL HYPERTENSION: ICD-10-CM

## 2021-03-22 DIAGNOSIS — R73.9 HYPERGLYCEMIA: ICD-10-CM

## 2021-03-22 DIAGNOSIS — K21.9 GASTROESOPHAGEAL REFLUX DISEASE WITHOUT ESOPHAGITIS: ICD-10-CM

## 2021-03-22 RX ORDER — ATORVASTATIN CALCIUM 20 MG/1
20 TABLET, FILM COATED ORAL AT BEDTIME
Qty: 90 TABLET | Refills: 3 | Status: SHIPPED | OUTPATIENT
Start: 2021-03-22 | End: 2021-12-15

## 2021-03-22 NOTE — TELEPHONE ENCOUNTER
"Requested Prescriptions   Pending Prescriptions Disp Refills     atorvastatin (LIPITOR) 20 MG tablet [Pharmacy Med Name: ATORVASTATIN 20MG TABLET] 90 tablet 3     Sig: TAKE 1 TABLET (20 MG) BY MOUTH AT BEDTIME       Statins Protocol Passed - 3/22/2021  2:43 PM        Passed - LDL on file in past 12 months     Recent Labs   Lab Test 03/18/21  1545   LDL 93             Passed - No abnormal creatine kinase in past 12 months     No lab results found.             Passed - Recent (12 mo) or future (30 days) visit within the authorizing provider's specialty     Patient has had an office visit with the authorizing provider or a provider within the authorizing providers department within the previous 12 mos or has a future within next 30 days. See \"Patient Info\" tab in inbasket, or \"Choose Columns\" in Meds & Orders section of the refill encounter.    LOV 3/18/2021          Passed - Medication is active on med list        Passed - Patient is age 18 or older        Passed - No active pregnancy on record        Passed - No positive pregnancy test in past 12 months         Prescription approved per [facility] Refill Protocol.  Isabel Cervantes RN on 3/22/2021 at 2:49 PM      "

## 2021-03-22 NOTE — PATIENT INSTRUCTIONS
If symptoms present again, please go to ED for further (and faster) evaluation.    Start magnesium supplement. Recheck this in 3 months.    Stop smoking.    Daily exercise (30 minutes walking) and limit caffeine. Increase daily water intake.

## 2021-03-23 LAB — INTERPRETATION ECG - MUSE: NORMAL

## 2021-03-23 RX ORDER — LOSARTAN POTASSIUM AND HYDROCHLOROTHIAZIDE 12.5; 5 MG/1; MG/1
1 TABLET ORAL DAILY
Qty: 90 TABLET | Refills: 0 | Status: SHIPPED | OUTPATIENT
Start: 2021-03-23 | End: 2021-06-17

## 2021-03-23 NOTE — TELEPHONE ENCOUNTER
Routing refill request to provider for review/approval because:  Last blood pressure under 140/90 in past 12 months        BP Readings from Last 3 Encounters:   03/18/21 (!) 150/80   02/28/20 130/80   09/18/19 (!) 140/78        LO:V 3/18/2021        Amelia Lopez RN on 3/23/2021 at 9:11 AM

## 2021-03-31 ENCOUNTER — HOSPITAL ENCOUNTER (OUTPATIENT)
Dept: MAMMOGRAPHY | Facility: OTHER | Age: 61
End: 2021-03-31
Attending: NURSE PRACTITIONER
Payer: COMMERCIAL

## 2021-03-31 ENCOUNTER — OFFICE VISIT (OUTPATIENT)
Dept: INTERNAL MEDICINE | Facility: OTHER | Age: 61
End: 2021-03-31
Attending: NURSE PRACTITIONER
Payer: COMMERCIAL

## 2021-03-31 ENCOUNTER — HOSPITAL ENCOUNTER (OUTPATIENT)
Dept: GENERAL RADIOLOGY | Facility: OTHER | Age: 61
End: 2021-03-31
Attending: NURSE PRACTITIONER
Payer: COMMERCIAL

## 2021-03-31 VITALS
SYSTOLIC BLOOD PRESSURE: 148 MMHG | HEART RATE: 76 BPM | DIASTOLIC BLOOD PRESSURE: 60 MMHG | WEIGHT: 186.6 LBS | HEIGHT: 62 IN | RESPIRATION RATE: 20 BRPM | TEMPERATURE: 97.4 F | OXYGEN SATURATION: 97 % | BODY MASS INDEX: 34.34 KG/M2

## 2021-03-31 DIAGNOSIS — D69.6 THROMBOCYTOPENIA (H): ICD-10-CM

## 2021-03-31 DIAGNOSIS — R01.1 HEART MURMUR: ICD-10-CM

## 2021-03-31 DIAGNOSIS — Z82.49 FAMILY HISTORY OF VALVULAR HEART DISEASE: ICD-10-CM

## 2021-03-31 DIAGNOSIS — Z23 NEED FOR PNEUMOCOCCAL VACCINATION: ICD-10-CM

## 2021-03-31 DIAGNOSIS — R74.8 ELEVATED LIVER ENZYMES: ICD-10-CM

## 2021-03-31 DIAGNOSIS — Z12.31 VISIT FOR SCREENING MAMMOGRAM: ICD-10-CM

## 2021-03-31 DIAGNOSIS — I10 ESSENTIAL HYPERTENSION: ICD-10-CM

## 2021-03-31 DIAGNOSIS — Z00.00 HEALTH CARE MAINTENANCE: Primary | ICD-10-CM

## 2021-03-31 DIAGNOSIS — R00.0 RACING HEART BEAT: ICD-10-CM

## 2021-03-31 DIAGNOSIS — K76.0 HEPATIC STEATOSIS: Primary | ICD-10-CM

## 2021-03-31 DIAGNOSIS — J45.20 MILD INTERMITTENT ASTHMA WITHOUT COMPLICATION: ICD-10-CM

## 2021-03-31 DIAGNOSIS — K76.0 HEPATIC STEATOSIS: ICD-10-CM

## 2021-03-31 DIAGNOSIS — R73.03 PREDIABETES: ICD-10-CM

## 2021-03-31 LAB
ALBUMIN SERPL-MCNC: 4.2 G/DL (ref 3.5–5.7)
ALBUMIN UR-MCNC: NEGATIVE MG/DL
ALP SERPL-CCNC: 106 U/L (ref 34–104)
ALT SERPL W P-5'-P-CCNC: 127 U/L (ref 7–52)
APPEARANCE UR: CLEAR
AST SERPL W P-5'-P-CCNC: 86 U/L (ref 13–39)
BILIRUB DIRECT SERPL-MCNC: 0.2 MG/DL (ref 0–0.2)
BILIRUB SERPL-MCNC: 1.3 MG/DL (ref 0.3–1)
BILIRUB UR QL STRIP: NEGATIVE
COLOR UR AUTO: NORMAL
ERYTHROCYTE [DISTWIDTH] IN BLOOD BY AUTOMATED COUNT: 12.6 % (ref 10–15)
GLUCOSE UR STRIP-MCNC: NEGATIVE MG/DL
HBA1C MFR BLD: 5.3 % (ref 4–6)
HCT VFR BLD AUTO: 45.8 % (ref 35–47)
HGB BLD-MCNC: 15.5 G/DL (ref 11.7–15.7)
HGB UR QL STRIP: NEGATIVE
KETONES UR STRIP-MCNC: NEGATIVE MG/DL
LEUKOCYTE ESTERASE UR QL STRIP: NEGATIVE
MCH RBC QN AUTO: 33.2 PG (ref 26.5–33)
MCHC RBC AUTO-ENTMCNC: 33.8 G/DL (ref 31.5–36.5)
MCV RBC AUTO: 98 FL (ref 78–100)
NITRATE UR QL: NEGATIVE
PH UR STRIP: 6.5 PH (ref 5–7)
PLATELET # BLD AUTO: 112 10E9/L (ref 150–450)
PROT SERPL-MCNC: 7.4 G/DL (ref 6.4–8.9)
RBC # BLD AUTO: 4.67 10E12/L (ref 3.8–5.2)
SOURCE: NORMAL
SP GR UR STRIP: 1.01 (ref 1–1.03)
UROBILINOGEN UR STRIP-MCNC: NORMAL MG/DL (ref 0–2)
WBC # BLD AUTO: 6.6 10E9/L (ref 4–11)

## 2021-03-31 PROCEDURE — 88142 CYTOPATH C/V THIN LAYER: CPT | Performed by: NURSE PRACTITIONER

## 2021-03-31 PROCEDURE — 36415 COLL VENOUS BLD VENIPUNCTURE: CPT | Mod: ZL | Performed by: NURSE PRACTITIONER

## 2021-03-31 PROCEDURE — 80076 HEPATIC FUNCTION PANEL: CPT | Mod: ZL | Performed by: NURSE PRACTITIONER

## 2021-03-31 PROCEDURE — 77063 BREAST TOMOSYNTHESIS BI: CPT

## 2021-03-31 PROCEDURE — 85027 COMPLETE CBC AUTOMATED: CPT | Mod: ZL | Performed by: NURSE PRACTITIONER

## 2021-03-31 PROCEDURE — 90471 IMMUNIZATION ADMIN: CPT | Performed by: NURSE PRACTITIONER

## 2021-03-31 PROCEDURE — 87624 HPV HI-RISK TYP POOLED RSLT: CPT | Mod: ZL | Performed by: NURSE PRACTITIONER

## 2021-03-31 PROCEDURE — 99396 PREV VISIT EST AGE 40-64: CPT | Mod: 25 | Performed by: NURSE PRACTITIONER

## 2021-03-31 PROCEDURE — 71046 X-RAY EXAM CHEST 2 VIEWS: CPT

## 2021-03-31 PROCEDURE — 83036 HEMOGLOBIN GLYCOSYLATED A1C: CPT | Mod: ZL | Performed by: NURSE PRACTITIONER

## 2021-03-31 PROCEDURE — 90670 PCV13 VACCINE IM: CPT | Performed by: NURSE PRACTITIONER

## 2021-03-31 PROCEDURE — G0123 SCREEN CERV/VAG THIN LAYER: HCPCS | Performed by: NURSE PRACTITIONER

## 2021-03-31 PROCEDURE — 81003 URINALYSIS AUTO W/O SCOPE: CPT | Mod: ZL | Performed by: NURSE PRACTITIONER

## 2021-03-31 RX ORDER — METOPROLOL SUCCINATE 25 MG/1
25 TABLET, EXTENDED RELEASE ORAL DAILY
Qty: 90 TABLET | Refills: 3 | Status: SHIPPED | OUTPATIENT
Start: 2021-03-31 | End: 2021-12-15

## 2021-03-31 ASSESSMENT — ENCOUNTER SYMPTOMS
DIFFICULTY URINATING: 0
CHEST TIGHTNESS: 0
TROUBLE SWALLOWING: 0
FATIGUE: 0
LIGHT-HEADEDNESS: 0
ABDOMINAL PAIN: 0
SHORTNESS OF BREATH: 0
CONSTIPATION: 0
UNEXPECTED WEIGHT CHANGE: 0
NAUSEA: 0
SLEEP DISTURBANCE: 0
DIZZINESS: 0
PALPITATIONS: 1
COUGH: 0
DIARRHEA: 0
MYALGIAS: 0
ANAL BLEEDING: 0
CHILLS: 0
ARTHRALGIAS: 0
HEMATOCHEZIA: 0
ADENOPATHY: 0
VOMITING: 0
ENDOCRINE NEGATIVE: 1

## 2021-03-31 ASSESSMENT — MIFFLIN-ST. JEOR: SCORE: 1359.66

## 2021-03-31 ASSESSMENT — PAIN SCALES - GENERAL: PAINLEVEL: NO PAIN (0)

## 2021-03-31 NOTE — NURSING NOTE
Chief Complaint   Patient presents with     Physical     annual     Pt has been having Afib episodes documented with [facility]. BP has been elevated. Stress test 4/16 scheduled. Last EKG with [facility] on 3/18    Medication Reconciliation: complete    Sofia Carpio LPN

## 2021-03-31 NOTE — PROGRESS NOTES
Subjective:  She is here today for routine healthcare maintenance.  She is due for annual Pap.  Had mammogram this morning.  Colonoscopy due in 2 years.  She continues to use tobacco with greater than a 30 pack/year history.  She has had lung CT in the past but declines this year.  She has history of stable pulmonary nodules.  She is not interested in tobacco cessation.  She is not using a nicotine patch at this time that had been prescribed in the past.  She has not had any alcohol in the last couple of weeks.  She has chronic mild elevation of liver enzyme.  History of hepatic steatosis noted on previous ultrasound from 2016.  CBC at recent appointment with platelets at 87,000.  On March 18 she was seen in clinic by Samantha Nava NP for racing heartbeat.  Her heart rate got as high as the 150s.  She wears a fit bit.  She did not have any chest pain or chest pressure or shortness of breath but reported that when she was seen in the clinic that day when the nurse put her stethoscope on her chest it was tender.  Her blood pressures were running high.  She is on losartan HCTZ 50/12.5 mg which she takes once daily.  Blood pressures continue to range 140-150 systolic over 80 diastolic.  She was not using caffeine that day nor did she feel anxious.  She was scheduled for a stress echocardiogram during that visit.  Had lab work completed as well.  EKG with normal sinus rhythm.  Immunizations: She is due for Prevnar 13.    Patient Active Problem List   Diagnosis     Elevated LFTs     HTN (hypertension)     Lupus (H)     Nicotine use disorder     Obesity     Disorder of bursae and tendons in shoulder region     Other affections of shoulder region, not elsewhere classified     Mixed hyperlipidemia     Hyperglycemia     Pulmonary nodules     Hepatic steatosis     Prediabetes     Gastroesophageal reflux disease without esophagitis     Mild intermittent asthma without complication     Past Medical History:   Diagnosis Date      Bacterial pneumonia 02/02/2010     Bitten by dog 09/2012    hospitalized     Chronic sinusitis     Chronic Sinusitis     Nonrheumatic mitral valve insufficiency 02/2005    Trace Mitral Regurgitation, Echo.  Does not require SBE     Other shoulder lesions, left shoulder     L shoulder RTC tendonitis     Personal history of other medical treatment (CODE)     prophylaxis     Personal history of other medical treatment (CODE) 03/11/2005    Echocardiogram 3/11/05 for family history of mitral valve and aortic valve replacement.  Trace mitral regurgitation.  Mitral valve appeared normal.  Tricuspid aortic valve without AI or AS.  No focal wall motion abnormalities.  Ejection fraction 72% 3/12/05.     Primary osteoarthritis of right knee     No Comments Provided     Tobacco use     quit 9/2012     Past Surgical History:   Procedure Laterality Date     ARTHROSCOPY KNEE Right 08/2001    Dr. 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     ARTHROSCOPY SHOULDER  2012    2011 / 2012,Left, supraspinatus repair     COLONOSCOPY  01/10/2013    hyperplastic, 1/10/23     ECHOCARDIOGRAM INTRAOPERATIVE IN OR  03/11/2005    for family history of mitral valve and aortic valve replacement.  Trace mitral regurgitation.  Mitral valve appeared normal.  Tricuspid aortic valve without AI or AS.  No focal wall motion abnormalities.  Ejection fraction 72%     OTHER SURGICAL HISTORY  03/13/2006    PREMALIG/BENIGN SKIN LESION EXCISION,Excision of a benign verrucose keratosis with actinic damage and lentiginous change.     OTHER SURGICAL HISTORY  12/2006    OH SODIUM HYALONURATE PER INJECTION,Synvisc injection     TONSILLECTOMY      No Comments Provided     Social History     Socioeconomic History     Marital status:      Spouse name: Not on file     Number of children: Not on file     Years of education: Not on file     Highest education level: Not on file   Occupational History     Not on file   Social Needs      Financial resource strain: Not on file     Food insecurity     Worry: Not on file     Inability: Not on file     Transportation needs     Medical: Not on file     Non-medical: Not on file   Tobacco Use     Smoking status: Current Every Day Smoker     Packs/day: 0.75     Years: 45.00     Pack years: 33.75     Types: Cigarettes     Last attempt to quit: 10/1/2012     Years since quittin.5     Smokeless tobacco: Never Used   Substance and Sexual Activity     Alcohol use: Yes     Alcohol/week: 3.0 standard drinks     Frequency: Monthly or less     Drug use: No     Sexual activity: Yes     Partners: Male   Lifestyle     Physical activity     Days per week: Not on file     Minutes per session: Not on file     Stress: Not on file   Relationships     Social connections     Talks on phone: Not on file     Gets together: Not on file     Attends [employer] service: Not on file     Active member of club or organization: Not on file     Attends meetings of clubs or organizations: Not on file     Relationship status: Not on file     Intimate partner violence     Fear of current or ex partner: Not on file     Emotionally abused: Not on file     Physically abused: Not on file     Forced sexual activity: Not on file   Other Topics Concern     Not on file   Social History Narrative    .  Works at the school district.   works at [email or web address].      She has 3 grown children.    [place] on [place] [facility]. Quit smoking 2012     Family History   Problem Relation Age of Onset     Hyperlipidemia Mother         Hyperlipidemia,Hyperlipidemia     Other - See Comments Mother         Stroke,CVA      Heart Disease Mother         Heart Disease,aortic and mitral valve replacements, rheumatic fever     Mitral Valve Replacement Mother      Diabetes Father         Diabetes     Mitral Valve Replacement Father      Rheumatic fever Father      Diabetes Sister      Polycystic ovary syndrome Child      Thyroid Disease Child      Heart  Disease Sister         Heart Disease,Valvular heart disease and diffuse athersclerosis on brain MRI     Other - See Comments Sister         rheumatic fever     Rheumatic fever Sister      Cerebrovascular Disease Sister      Other - See Comments Maternal Grandmother         Stroke,CVA     Prostate Cancer Paternal Grandfather         Cancer-prostate     Hypertension Sister      Hyperlipidemia Sister      Current Outpatient Medications   Medication Sig Dispense Refill     atorvastatin (LIPITOR) 20 MG tablet TAKE 1 TABLET (20 MG) BY MOUTH AT BEDTIME 90 tablet 3     losartan-hydrochlorothiazide (HYZAAR) 50-12.5 MG tablet Take 1 tablet by mouth daily 90 tablet 0     magnesium oxide (MAG-OX) 400 (241.3 Mg) MG tablet Take 1 tablet (400 mg) by mouth daily 90 tablet 0     metoprolol succinate ER (TOPROL-XL) 25 MG 24 hr tablet Take 1 tablet (25 mg) by mouth daily 90 tablet 3     omeprazole (PRILOSEC) 20 MG DR capsule TAKE 1 CAPSULE BY MOUTH EVERY DAY BEFORE A MEAL 90 capsule 0     other medical supplies Magnesium spray as needed  0     albuterol (PROAIR HFA/PROVENTIL HFA/VENTOLIN HFA) 108 (90 Base) MCG/ACT inhaler Inhale 1-2 puffs into the lungs every 4 hours as needed (Patient not taking: Reported on 3/31/2021) 1 Inhaler 11     Lisinopril      Review of Systems:  Review of Systems   Constitutional: Negative for chills, fatigue and unexpected weight change.   HENT: Negative for mouth sores and trouble swallowing.    Respiratory: Negative for cough, chest tightness and shortness of breath.    Cardiovascular: Positive for palpitations. Negative for chest pain and peripheral edema.   Gastrointestinal: Negative for abdominal pain, anal bleeding, constipation, diarrhea, hematochezia, nausea and vomiting.   Endocrine: Negative.    Genitourinary: Negative for difficulty urinating, pelvic pain, vaginal bleeding and vaginal discharge.   Musculoskeletal: Negative for arthralgias and myalgias.   Neurological: Negative for dizziness,  "syncope and light-headedness.   Hematological: Negative for adenopathy.   Psychiatric/Behavioral: Negative for sleep disturbance.        On Thursday, she lost her job which she has had for 30 years.  She has felt tearful and anxious but feels like she is coming to terms with it.  Does not really feel that she is depressed.       Objective:   BP (!) 148/60   Pulse 76   Temp 97.4  F (36.3  C) (Tympanic)   Resp 20   Ht 1.575 m (5' 2\")   Wt 84.6 kg (186 lb 9.6 oz)   SpO2 97%   Breastfeeding No   BMI 34.13 kg/m    Physical Exam  Pleasant female in no acute distress.  Affect normal.  Alert and oriented x4.  Sclera nonicteric.  Conjunctiva noninflamed.  TMs clear bilaterally.  Oral mucosa pink and moist.  Throat without erythema.  Neck supple without adenopathy.  No thyromegaly.  No carotid bruits.  Lung fields clear to auscultation throughout.  Cardiovascular regular rate and rhythm with 2/6 systolic ejection murmur.  Abdomen soft with normal active bowel sounds x4 quadrants.  No tenderness masses organomegaly.  No abdominal bruits or pulsatile masses.  No axillary or inguinal adenopathy.  External genitalia pink and without lesion.  Cervix midline.  Pap and HPV obtained.  No uterine or neck so masses noted with palpation.  Rectal exam benign.  Extremities without edema.  DP PT 2+.  Capillary refill less than 3 seconds.    Recent office visit notes, labs and EKG reviewed and discussed with patient.  2015 echocardiogram results reviewed and discussed with patient indicating normal systolic and diastolic function and no valvular heart disease.    Assessment:    ICD-10-CM    1. Health care maintenance  Z00.00 HPV High Risk Types DNA Cervical     Pap Screen Thin Prep with HPV - recommended age 30 - 65 years (select HPV order below)     UA reflex to Microscopic   2. Racing heart beat  R00.0 Leadless EKG Monitor 8 to 14 Days     XR Chest 2 Views   3. Essential hypertension  I10 metoprolol succinate ER (TOPROL-XL) 25 MG " 24 hr tablet   4. Prediabetes  R73.03 Hemoglobin A1c     Hemoglobin A1c   5. Hepatic steatosis  K76.0 Hepatic Function Panel     Hepatic Function Panel     CBC with platelets   6. Thrombocytopenia (H)  D69.6    7. Family history of valvular heart disease  Z82.49 Echocardiogram Complete   8. Heart murmur  R01.1 XR Chest 2 Views   9. Mild intermittent asthma without complication  J45.20    10. Need for pneumococcal vaccination  Z23 PNEUMO CONJ 13-V (2010&AFTER)       Plan:   Prevnar 13 vaccine provided today.  Recent labs stable with well-controlled lipids.  She has chronic mild liver elevations with history of hepatic steatosis.  Also on last clinic appointment platelet low at 87,000.  She stopped drinking all alcohol and requested to have that rechecked.  We will check hepatic function panel again at her request.  Also check CBC, hemoglobin A1c with history of prediabetes and recent thrombocytopenia.  Blood pressure is running high and she is also having symptoms of tachycardia reported at previous clinic appointment.  We will start her on Toprol-XL 25 mg daily.  Obtain echocardiogram and Zio patch.  Tobacco cessation encouraged, not interested at this time.  Asthma stable without needing to use her albuterol recently.  She does have murmur on exam today with family history of mitral valve disease but her last echocardiogram showing no indication of valvular heart disease.  We will also obtain chest x-ray with her history of tobacco use, tachycardia and hypertension.  She will be contacted with results of labs, Pap and HPV.  She will need colonoscopy in 2023.  Recommended chest CT for lung cancer screening but patient declined.  Risk and benefits reviewed.  Mammogram completed today.    ANAYA Zuniga   3/31/2021  9:57 AM

## 2021-04-07 LAB
COPATH REPORT: NORMAL
PAP: NORMAL

## 2021-04-13 LAB
FINAL DIAGNOSIS: NORMAL
HPV HR 12 DNA CVX QL NAA+PROBE: NEGATIVE
HPV16 DNA SPEC QL NAA+PROBE: NEGATIVE
HPV18 DNA SPEC QL NAA+PROBE: NEGATIVE
SPECIMEN DESCRIPTION: NORMAL
SPECIMEN SOURCE CVX/VAG CYTO: NORMAL

## 2021-04-16 ENCOUNTER — HOSPITAL ENCOUNTER (OUTPATIENT)
Dept: CARDIOLOGY | Facility: OTHER | Age: 61
End: 2021-04-16
Attending: NURSE PRACTITIONER
Payer: COMMERCIAL

## 2021-04-16 PROCEDURE — 93306 TTE W/DOPPLER COMPLETE: CPT

## 2021-04-16 PROCEDURE — 93306 TTE W/DOPPLER COMPLETE: CPT | Mod: 26 | Performed by: INTERNAL MEDICINE

## 2021-04-20 ENCOUNTER — HOSPITAL ENCOUNTER (OUTPATIENT)
Dept: CARDIOLOGY | Facility: OTHER | Age: 61
Discharge: HOME | End: 2021-04-20
Attending: NURSE PRACTITIONER | Admitting: NURSE PRACTITIONER
Payer: COMMERCIAL

## 2021-04-20 DIAGNOSIS — R00.0 RACING HEART BEAT: ICD-10-CM

## 2021-04-20 PROCEDURE — 999N000157 HC STATISTIC RCP TIME EA 10 MIN

## 2021-04-20 PROCEDURE — 93246 EXT ECG>7D<15D RECORDING: CPT

## 2021-04-20 PROCEDURE — 93248 EXT ECG>7D<15D REV&INTERPJ: CPT | Performed by: INTERNAL MEDICINE

## 2021-04-20 NOTE — PATIENT INSTRUCTIONS
Date Placed on Pt:  04/20/2021    Patient instructed not to:   -take baths, swim, sauna   -remove device prior to 14 days   -use electric blankets   -shower or sweat excessively within first 24 hours of device application  Patient instructed to:   -shower as needed   -be carefull when toweling off and dressing   -press button when cardiac symptoms occur   -document symptoms in log book   -remove and return device (send via mail to [facility])   -Call [facility] with any questions

## 2021-04-30 ENCOUNTER — TELEPHONE (OUTPATIENT)
Dept: CARDIOLOGY | Facility: OTHER | Age: 61
End: 2021-04-30

## 2021-04-30 NOTE — TELEPHONE ENCOUNTER
Patient verified .  Reminder call for stress test with instructions given. Will hold her toprol day before and day of test.  Will bring toprol to take after stress test.   Patient verbalized understanding.

## 2021-05-04 ENCOUNTER — HOSPITAL ENCOUNTER (OUTPATIENT)
Dept: CARDIOLOGY | Facility: OTHER | Age: 61
Discharge: HOME | End: 2021-05-04
Attending: NURSE PRACTITIONER | Admitting: NURSE PRACTITIONER
Payer: COMMERCIAL

## 2021-05-04 VITALS
HEART RATE: 67 BPM | SYSTOLIC BLOOD PRESSURE: 138 MMHG | TEMPERATURE: 97.6 F | OXYGEN SATURATION: 95 % | DIASTOLIC BLOOD PRESSURE: 78 MMHG

## 2021-05-04 DIAGNOSIS — R00.0 RAPID HEART RATE: ICD-10-CM

## 2021-05-04 DIAGNOSIS — R03.0 ELEVATED BP WITHOUT DIAGNOSIS OF HYPERTENSION: ICD-10-CM

## 2021-05-04 PROCEDURE — 93017 CV STRESS TEST TRACING ONLY: CPT

## 2021-05-04 PROCEDURE — 93321 DOPPLER ECHO F-UP/LMTD STD: CPT | Mod: 26 | Performed by: STUDENT IN AN ORGANIZED HEALTH CARE EDUCATION/TRAINING PROGRAM

## 2021-05-04 PROCEDURE — 93350 STRESS TTE ONLY: CPT | Mod: 26 | Performed by: STUDENT IN AN ORGANIZED HEALTH CARE EDUCATION/TRAINING PROGRAM

## 2021-05-04 PROCEDURE — 93018 CV STRESS TEST I&R ONLY: CPT | Performed by: INTERNAL MEDICINE

## 2021-05-04 PROCEDURE — 255N000002 HC RX 255 OP 636: Performed by: NURSE PRACTITIONER

## 2021-05-04 PROCEDURE — 93325 DOPPLER ECHO COLOR FLOW MAPG: CPT | Mod: 26 | Performed by: STUDENT IN AN ORGANIZED HEALTH CARE EDUCATION/TRAINING PROGRAM

## 2021-05-04 PROCEDURE — 93016 CV STRESS TEST SUPVJ ONLY: CPT | Performed by: INTERNAL MEDICINE

## 2021-05-04 RX ADMIN — PERFLUTREN 4 ML: 6.52 INJECTION, SUSPENSION INTRAVENOUS at 09:15

## 2021-05-04 NOTE — PROGRESS NOTES
0755 The patient arrived for a stress echo.  The procedure, risks and benefits were discussed and the consent was signed.  The patient was prepped for the stress test, and an IV was placed per procedure.  The echo sonographer did the initial images with definity for image enhancement. Rhona Ellington NP arrived, and the patient biked 7 minutes.  The patient tolerated the procedure.  Stress images were completed and the IV was removed per procedure.  The patient was released in stable condition.  The patient was instructed that the ordering MD will call with results in one to two days.  If you have not received your results within 3 days please call the ordering provider.  Please see the chart for complete test results.

## 2021-05-07 ENCOUNTER — TELEPHONE (OUTPATIENT)
Dept: INTERNAL MEDICINE | Facility: OTHER | Age: 61
End: 2021-05-07

## 2021-05-07 DIAGNOSIS — R00.0 RAPID HEART RATE: Primary | ICD-10-CM

## 2021-05-07 DIAGNOSIS — R94.39 EQUIVOCAL STRESS TEST: ICD-10-CM

## 2021-05-07 NOTE — TELEPHONE ENCOUNTER
"Called to let Pt know about her stress test results.     \"Please let patient know her results have returned and her stress test overall was borderline.  If she is having ongoing symptoms I would recommend consideration of a cardiology appointment to determine whether additional testing would be beneficial.  If she is interested in this please let me know and I will place orders.  Call if she has any questions. \" Per [facility] note.     Pt stated she would like to follow up with cardiology and asked that all necessary orders/referrals be placed.   Sofia Carpio LPN on 5/7/2021 at 9:10 AM        "

## 2021-05-07 NOTE — RESULT ENCOUNTER NOTE
Please let patient know her results have returned and her stress test overall was borderline.  If she is having ongoing symptoms I would recommend consideration of a cardiology appointment to determine whether additional testing would be beneficial.  If she is interested in this please let me know and I will place orders.  Call if she has any questions.

## 2021-05-10 NOTE — TELEPHONE ENCOUNTER
After verifying name and birth date, patient was notified of provider's response. She would like to stay in [place] for this.  Ivonne Reyes([facility])........5/10/2021  9:29 AM

## 2021-05-13 ENCOUNTER — TELEPHONE (OUTPATIENT)
Dept: INTERNAL MEDICINE | Facility: OTHER | Age: 61
End: 2021-05-13

## 2021-05-13 DIAGNOSIS — K76.0 HEPATIC STEATOSIS: Primary | ICD-10-CM

## 2021-05-13 NOTE — TELEPHONE ENCOUNTER
RKV-patient is looking for referral from a Dr in [place] that she was referred to and now needs another test    Please call and advise    Thank You    Natalie Bright on 5/13/2021 at 3:57 PM

## 2021-05-14 NOTE — TELEPHONE ENCOUNTER
Seth Garcia NP, GI at [facility]'s appt was yesterday for liver.  Norma J. Gosselin, LPN .......  5/14/2021  8:31 AM

## 2021-05-14 NOTE — TELEPHONE ENCOUNTER
She is speaking to them at this time and will call back once she knows more of what they need.  Norma J. Gosselin, LPN .......  5/14/2021  8:45 AM

## 2021-05-24 ENCOUNTER — HOSPITAL ENCOUNTER (OUTPATIENT)
Dept: ULTRASOUND IMAGING | Facility: OTHER | Age: 61
End: 2021-05-24
Attending: PHYSICIAN ASSISTANT
Payer: COMMERCIAL

## 2021-05-24 ENCOUNTER — ALLIED HEALTH/NURSE VISIT (OUTPATIENT)
Dept: FAMILY MEDICINE | Facility: OTHER | Age: 61
End: 2021-05-24
Attending: PHYSICIAN ASSISTANT
Payer: COMMERCIAL

## 2021-05-24 DIAGNOSIS — R74.8 ELEVATED LIVER ENZYMES: ICD-10-CM

## 2021-05-24 DIAGNOSIS — Z23 NEED FOR VACCINATION: Primary | ICD-10-CM

## 2021-05-24 PROCEDURE — 76700 US EXAM ABDOM COMPLETE: CPT

## 2021-05-24 PROCEDURE — 90632 HEPA VACCINE ADULT IM: CPT

## 2021-05-24 PROCEDURE — 90471 IMMUNIZATION ADMIN: CPT

## 2021-05-24 NOTE — PROGRESS NOTES
Immunization Documentation  Verified patient's first and last name, and . Stated reason for visit today is to receive a Hep A vaccine. Accompained to clinic visit with self. Denied any concerns with previous immunizations. Allergies reviewed. VIS handout(s) reviewed and given to take home. Havrix prepared and administered IM per standing order. Administration documented in IMMUNIZATIONS (see flowsheet and order for further information). Pt Instructed to wait in lobby for 15 minutes post-injection and notify staff immediately of any reaction.     Shauna Scherer RN ....................  2021   10:55 AM

## 2021-06-10 ENCOUNTER — ALLIED HEALTH/NURSE VISIT (OUTPATIENT)
Dept: FAMILY MEDICINE | Facility: OTHER | Age: 61
End: 2021-06-10
Attending: FAMILY MEDICINE
Payer: COMMERCIAL

## 2021-06-10 DIAGNOSIS — Z20.822 COVID-19 RULED OUT: Primary | ICD-10-CM

## 2021-06-10 LAB
SARS-COV-2 RNA RESP QL NAA+PROBE: NORMAL
SPECIMEN SOURCE: NORMAL

## 2021-06-10 PROCEDURE — U0003 INFECTIOUS AGENT DETECTION BY NUCLEIC ACID (DNA OR RNA); SEVERE ACUTE RESPIRATORY SYNDROME CORONAVIRUS 2 (SARS-COV-2) (CORONAVIRUS DISEASE [COVID-19]), AMPLIFIED PROBE TECHNIQUE, MAKING USE OF HIGH THROUGHPUT TECHNOLOGIES AS DESCRIBED BY CMS-2020-01-R: HCPCS | Mod: ZL | Performed by: FAMILY MEDICINE

## 2021-06-10 PROCEDURE — C9803 HOPD COVID-19 SPEC COLLECT: HCPCS

## 2021-06-10 PROCEDURE — U0005 INFEC AGEN DETEC AMPLI PROBE: HCPCS | Mod: ZL | Performed by: FAMILY MEDICINE

## 2021-06-11 LAB
LABORATORY COMMENT REPORT: NORMAL
SARS-COV-2 RNA RESP QL NAA+PROBE: NEGATIVE
SPECIMEN SOURCE: NORMAL

## 2021-06-16 ENCOUNTER — OFFICE VISIT (OUTPATIENT)
Dept: CARDIOLOGY | Facility: OTHER | Age: 61
End: 2021-06-16
Attending: INTERNAL MEDICINE
Payer: COMMERCIAL

## 2021-06-16 VITALS
RESPIRATION RATE: 16 BRPM | BODY MASS INDEX: 33.29 KG/M2 | HEART RATE: 64 BPM | SYSTOLIC BLOOD PRESSURE: 138 MMHG | OXYGEN SATURATION: 95 % | DIASTOLIC BLOOD PRESSURE: 78 MMHG | WEIGHT: 182 LBS | TEMPERATURE: 98.3 F

## 2021-06-16 DIAGNOSIS — D69.6 THROMBOCYTOPENIA (H): ICD-10-CM

## 2021-06-16 DIAGNOSIS — R00.0 RAPID HEART RATE: Primary | ICD-10-CM

## 2021-06-16 DIAGNOSIS — R79.89 ELEVATED LFTS: ICD-10-CM

## 2021-06-16 DIAGNOSIS — F17.200 NICOTINE USE DISORDER: ICD-10-CM

## 2021-06-16 DIAGNOSIS — E78.2 MIXED HYPERLIPIDEMIA: ICD-10-CM

## 2021-06-16 DIAGNOSIS — R73.03 PREDIABETES: ICD-10-CM

## 2021-06-16 DIAGNOSIS — I10 ESSENTIAL HYPERTENSION: ICD-10-CM

## 2021-06-16 DIAGNOSIS — K21.9 GASTROESOPHAGEAL REFLUX DISEASE WITHOUT ESOPHAGITIS: ICD-10-CM

## 2021-06-16 DIAGNOSIS — R94.39 EQUIVOCAL STRESS TEST: ICD-10-CM

## 2021-06-16 DIAGNOSIS — K74.00 LIVER FIBROSIS: ICD-10-CM

## 2021-06-16 DIAGNOSIS — K76.0 HEPATIC STEATOSIS: ICD-10-CM

## 2021-06-16 PROCEDURE — 99215 OFFICE O/P EST HI 40 MIN: CPT | Performed by: NURSE PRACTITIONER

## 2021-06-16 PROCEDURE — 93000 ELECTROCARDIOGRAM COMPLETE: CPT | Performed by: INTERNAL MEDICINE

## 2021-06-16 SDOH — HEALTH STABILITY: MENTAL HEALTH: HOW OFTEN DO YOU HAVE 6 OR MORE DRINKS ON ONE OCCASION?: NOT ASKED

## 2021-06-16 SDOH — HEALTH STABILITY: MENTAL HEALTH: HOW MANY STANDARD DRINKS CONTAINING ALCOHOL DO YOU HAVE ON A TYPICAL DAY?: NOT ASKED

## 2021-06-16 ASSESSMENT — PAIN SCALES - GENERAL: PAINLEVEL: NO PAIN (0)

## 2021-06-16 NOTE — PATIENT INSTRUCTIONS
You were seen by  SMITHA Block CNP      1. Please monitor any changes that you and Bianca discussed.    You will follow up with [facility] in 6 months, sooner if needed.     Please call the cardiology office with problems, questions, or concerns at [phone].    If you experience chest pain, chest pressure, chest tightness, shortness of breath, fainting, lightheadedness, nausea, vomiting, or other concerning symptoms, please report to the Emergency Department or call 911. These symptoms may be emergent, and best treated in the Emergency Department.     Cardiology Nurses  DOROTEO Varma, DOROTEO Szymanski, LPN  Jovanna, LPN  [facility] (Unit [address])  [phone]

## 2021-06-16 NOTE — NURSING NOTE
"Chief Complaint   Patient presents with     Consult     rapid heart rate       Initial /78 (BP Location: Right arm, Patient Position: Sitting, Cuff Size: Adult Regular)   Pulse 64   Temp 98.3  F (36.8  C) (Tympanic)   Resp 16   Wt 82.6 kg (182 lb)   SpO2 95%   BMI 33.29 kg/m   Estimated body mass index is 33.29 kg/m  as calculated from the following:    Height as of 3/31/21: 1.575 m (5' 2\").    Weight as of this encounter: 82.6 kg (182 lb).  Meds Reconciled: complete  Pt is not on Aspirin  Pt is on a Statin  PHQ and/or SARAH reviewed. Pt referred to PCP/MH Provider as appropriate.    Jovanna Pat LPN      "

## 2021-06-16 NOTE — PROGRESS NOTES
[facility] HEART CARE   CARDIOLOGY CONSULT     Charleen Huddleston   [address]  [facility] [id number]      Elisa Doherty     Chief Complaint   Patient presents with     Consult     rapid heart rate        HPI:   Ms. Huddleston is a 61 year old female who presents for cardiology evaluation with equivocal stress test and racing heart palpitations.  She has a history of hypertension, hyperlipidemia, prediabetes, SLE, ongoing tobacco abuse, elevated liver enzymes, hepatic steatosis and alcohol-related thrombocytopenia.  Patient does have a family history of structural heart disease, mitral disease in her father and sister.    Patient reported symptoms of racing heart back in March 2021.  She noted the racing heart with a heart rate up to 150s on her Fitbit.  She did not describe any chest pain or pressure at that time, no increased dyspnea.  She had also been identified to be hypertensive, she had been on losartan hydrochlorothiazide 50-12.5 mg daily.    She is also followed for elevated liver enzymes by gastroenterology, chronic thrombocytopenia.  At visit on 4/15/2021 gastroenterology recommended further laboratory evaluation to evaluate the cause/etiology of her elevated liver enzymes.  A FibroScan was also recommended to determine if there is any liver fibrosis and ultrasound of the abdomen to include spleen and liver for any causes of potential portal vein hypertension with her thrombocytopenia.  Her FibroScan was completed on 5/13/2021 revealing a F4 fibrosis.  It was recommended that she proceed with abdominal ultrasound, AFP and upper endoscopy to screen for esophageal varices.    Previous echocardiogram in November 2013 revealing normal LV size and function, estimated EF at 65% without regional wall motion abnormalities.  Normal RV size and function.  Mild LA enlargement.  Borderline LVH.  No significant valvular heart disease identified.  The aortic and mitral valves appeared structurally  normal.    Repeat echocardiogram on 4/16/2021 revealing normal global and regional LV function, LVEF 60 to 65%.  Borderline concentric wall thickening of the LV.  No regional wall motion abnormalities.  Normal RV size and function.  Both atria appeared normal with atrial septum intact.  The mitral valve is normal in structure and function.  Mild aortic valve calcification with a mean aortic valve pressure gradient of 10 mmHg and a calculated JANI 1.6 cm , RVSP 22.8 mmHg plus RAP, normal pulmonary artery systolic pressure.  No pericardial effusion.    An exercise stress test was ordered secondary to hypertension uncontrolled and racing heart.  This was performed as an exercise stress echocardiogram on 5/4/2021.  This was a submaximal, likely low risk exercise stress echocardiogram.  Patient only achieved 80% of the maximum predicted heart rate.  Normal LV size and function at baseline.  Mild LVH.  The LVEF was 55 to 60% at rest and increased appropriately to greater than 70% after exercise with appropriate decrease in LV cavity size.  Normal heart rate response to exercise.  Hypertensive response to exercise.  No anginal symptoms were endorsed during the study.  Doppler exam with no significant valvular abnormalities.  Her resting ECG revealed nonspecific ST and T wave changes.  Tracings throughout exercise without ectopy of significance.  There was some slight ST depression at almost 1 mm in the inferior and lateral leads with peak exercise.  In the recovery phase, no ectopy and continued ST changes were seen.  No evidence of exercise-induced dysrhythmia.  Inconclusive EKG portion of the stress test for exercise-induced myocardial ischemia.    ZIO monitor ordered for racing heart palpitations (4/20-5/2/21) revealing underlying sinus rhythm.  Average heart rate 76 bpm.  The heart rate ranged from 53 to 116 bpm.  No patient triggered events.  3 nonsustained runs of SVT occurred with the longest lasting 5 beats.  No VT,  AF/AFL, symptomatic bradycardia, pauses, second-degree Mobitz type II or third-degree AV block.  Rare SVE and VE, less than 1%.  Ventricular bigeminy was present lasting up to 5.1 seconds.    Today she describes the chest pain mainly only occurring with palpitations and describes as a sharp pain.  No radiation to the arm, jaw, neck or back.  No reports of lightheadedness or syncope.  No edema.  Mild dyspnea.  Recent heart improved with recently starting metoprolol.  She came back on tobacco use, currently down to half a pack per day.  No ETOH use since April 2021.      PAST MEDICAL HISTORY:   Past Medical History:   Diagnosis Date     Bacterial pneumonia 02/02/2010     Bitten by dog 09/2012    hospitalized     Chronic sinusitis     Chronic Sinusitis     Nonrheumatic mitral valve insufficiency 02/2005    Trace Mitral Regurgitation, Echo.  Does not require SBE     Other shoulder lesions, left shoulder     L shoulder RTC tendonitis     Personal history of other medical treatment (CODE)     prophylaxis     Personal history of other medical treatment (CODE) 03/11/2005    Echocardiogram 3/11/05 for family history of mitral valve and aortic valve replacement.  Trace mitral regurgitation.  Mitral valve appeared normal.  Tricuspid aortic valve without AI or AS.  No focal wall motion abnormalities.  Ejection fraction 72% 3/12/05.     Primary osteoarthritis of right knee     No Comments Provided     Tobacco use     quit 9/2012          FAMILY HISTORY:   Family History   Problem Relation Age of Onset     Hyperlipidemia Mother         Hyperlipidemia,Hyperlipidemia     Other - See Comments Mother         Stroke,CVA 03/07     Heart Disease Mother         Heart Disease,aortic and mitral valve replacements, rheumatic fever     Mitral Valve Replacement Mother      Diabetes Father         Diabetes     Mitral Valve Replacement Father      Rheumatic fever Father      Diabetes Sister      Polycystic ovary syndrome Child      Thyroid  Disease Child      Heart Disease Sister         Heart Disease,Valvular heart disease and diffuse athersclerosis on brain MRI     Other - See Comments Sister         rheumatic fever     Rheumatic fever Sister      Cerebrovascular Disease Sister      Other - See Comments Maternal Grandmother         Stroke,CVA     Prostate Cancer Paternal Grandfather         Cancer-prostate     Hypertension Sister      Hyperlipidemia Sister           PAST SURGICAL HISTORY:   Past Surgical History:   Procedure Laterality Date     ARTHROSCOPY KNEE Right 08/2001    Dr. 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     ARTHROSCOPY SHOULDER  2012    2011 / 2012,Left, supraspinatus repair     COLONOSCOPY  01/10/2013    hyperplastic, 1/10/23     ECHOCARDIOGRAM INTRAOPERATIVE IN OR  03/11/2005    for family history of mitral valve and aortic valve replacement.  Trace mitral regurgitation.  Mitral valve appeared normal.  Tricuspid aortic valve without AI or AS.  No focal wall motion abnormalities.  Ejection fraction 72%     OTHER SURGICAL HISTORY  03/13/2006    PREMALIG/BENIGN SKIN LESION EXCISION,Excision of a benign verrucose keratosis with actinic damage and lentiginous change.     OTHER SURGICAL HISTORY  12/2006    AR SODIUM HYALONURATE PER INJECTION,Synvisc injection     TONSILLECTOMY      No Comments Provided          SOCIAL HISTORY:   Social History     Socioeconomic History     Marital status:      Spouse name: None     Number of children: None     Years of education: None     Highest education level: None   Occupational History     None   Social Needs     Financial resource strain: None     Food insecurity     Worry: None     Inability: None     Transportation needs     Medical: None     Non-medical: None   Tobacco Use     Smoking status: Current Every Day Smoker     Packs/day: 0.75     Years: 45.00     Pack years: 33.75     Types: Cigarettes     Last attempt to quit: 10/1/2012     Years since quitting:  8.7     Smokeless tobacco: Never Used   Substance and Sexual Activity     Alcohol use: Yes     Alcohol/week: 3.0 standard drinks     Frequency: Monthly or less     Drug use: No     Sexual activity: Yes     Partners: Male   Lifestyle     Physical activity     Days per week: None     Minutes per session: None     Stress: None   Relationships     Social connections     Talks on phone: None     Gets together: None     Attends [employer] service: None     Active member of club or organization: None     Attends meetings of clubs or organizations: None     Relationship status: None     Intimate partner violence     Fear of current or ex partner: None     Emotionally abused: None     Physically abused: None     Forced sexual activity: None   Other Topics Concern     None   Social History Narrative    .  Works at the school district.   works at [employer].      She has 3 grown children.    Lives on [address]. Quit smoking 9/2012          CURRENT MEDICATIONS:   Prior to Admission medications    Medication Sig Start Date End Date Taking? Authorizing Provider   atorvastatin (LIPITOR) 20 MG tablet TAKE 1 TABLET (20 MG) BY MOUTH AT BEDTIME 3/22/21  Yes Elisa Doherty NP   losartan-hydrochlorothiazide (HYZAAR) 50-12.5 MG tablet Take 1 tablet by mouth daily 3/23/21  Yes Elisa Doherty NP   magnesium oxide (MAG-OX) 400 (241.3 Mg) MG tablet Take 1 tablet (400 mg) by mouth daily 3/22/21 6/20/21 Yes Samantha Nava NP   metoprolol succinate ER (TOPROL-XL) 25 MG 24 hr tablet Take 1 tablet (25 mg) by mouth daily 3/31/21  Yes Elisa Doherty NP   omeprazole (PRILOSEC) 20 MG DR capsule TAKE 1 CAPSULE BY MOUTH EVERY DAY BEFORE A MEAL 3/23/21  Yes Elisa Doherty NP   other medical supplies Magnesium spray as needed 8/24/18  Yes Elisa Doherty NP   albuterol (PROAIR HFA/PROVENTIL HFA/VENTOLIN HFA) 108 (90 Base) MCG/ACT inhaler Inhale 1-2 puffs into the lungs every 4 hours as  needed  Patient not taking: Reported on 3/31/2021 8/24/18   Elisa Doherty NP          ALLERGIES:   Allergies   Allergen Reactions     Lisinopril      cough          ROS:   CONSTITUTIONAL: No reported fever or chills. No changes in weight.  ENT: No visual disturbance, ear ache, epistaxis or sore throat.   CARDIOVASCULAR: Positive for episodes of chest pain with palpitations. Palpitations reported as improved.  No increased lower extremity edema.   RESPIRATORY: Mild CORDOVA without progression.  No cough, wheezing or hemoptysis.  No reports of orthopnea or PND.  GI: No reported abdominal pain.  : No reported hematuria or dysuria.  NEUROLOGICAL: No lightheadedness, dizziness, syncope, ataxia, paresthesias or weakness.   HEMATOLOGIC: No history of anemia. No bleeding or excessive bruising. No history of blood clots.   MUSCULOSKELETAL: No new joint pain or swelling, no muscle pain.  ENDOCRINOLOGIC: No temperature intolerance. No hair or skin changes.  SKIN: No abnormal rashes or sores, no unusual itching.  PSYCHIATRIC: No history of depression or anxiety. No changes in mood, feeling down or anxious. No changes in sleep.      PHYSICAL EXAM:   /78 (BP Location: Right arm, Patient Position: Sitting, Cuff Size: Adult Regular)   Pulse 64   Temp 98.3  F (36.8  C) (Tympanic)   Resp 16   Wt 82.6 kg (182 lb)   SpO2 95%   BMI 33.29 kg/m  \  GENERAL: The patient is a well-developed, well-nourished, in no apparent distress.  HEENT: Head is normocephalic and atraumatic. Eyes are symmetrical with normal visual tracking. No icterus, no xanthelasmas. Nares appeared normal without nasal drainage. Mucous membranes are moist, no cyanosis.  NECK: Supple. No cervical bruits, JVP not visible.   CHEST/ LUNGS: Lungs clear to auscultation, no rales, rhonchi or wheezes, no use of accessory muscles, no retractions, respirations unlabored and normal respiratory rate.   CARDIO: Regular rate and rhythm normal with S1 and S2, no S3  or S4 and no murmur, click or rub. Precordium quiet with normal PMI.    ABD: Abdomen is nondistended.   EXTREMITIES: Trace LE edema present. Peripheral pulses normal and equal bilaterally.  MUSCULOSKELETAL: No visible joint swelling.   NEUROLOGIC: Alert and oriented X3. Normal speech, gait and affect. No focal neurologic deficits.   SKIN: No jaundice. No rashes or visible skin lesions present. No ecchymosis.     EKG:    Normal sinus rhythm, rate 61 bpm.  No ST-T changes    LAB RESULTS:   [facility]/Nurse Visit on 06/10/2021   Component Date Value Ref Range Status     COVID-19 Virus PCR to [facility] - So* 06/10/2021 Nasopharyngeal   Final     COVID-19 Virus PCR to [facility] - Re* 06/10/2021 Test received-See reflex to IDDL test SARS CoV2 (COVID-19) Virus RT-PCR   Final     SARS-CoV-2 Virus Specimen Source 06/10/2021 Nasopharyngeal   Final     SARS-CoV-2 PCR Result 06/10/2021 NEGATIVE   Final     SARS-CoV-2 PCR Comment 06/10/2021 Testing was performed using the georges SARS-CoV-2 assay on the georges I-frontdesk[employer] System.2   Final   Office Visit on 03/31/2021   Component Date Value Ref Range Status     HPV Source 03/31/2021 ThinPrep   Corrected     HPV 16 DNA 03/31/2021 Negative  NEG^Negative Final     HPV 18 DNA 03/31/2021 Negative  NEG^Negative Final     Other HR HPV 03/31/2021 Negative  NEG^Negative Final     Final Diagnosis 03/31/2021 This patient's sample is negative for HPV DNA.   Final     Specimen Description 03/31/2021 Cervical Cells   Final     PAP 03/31/2021 NIL   Final     Copath Report 03/31/2021    Final                    Value:  Patient Name: ARABELLA REYES  MR#: [id number]  Specimen #: [id number]  Collected: 3/31/2021  Received: 4/5/2021  Reported: 4/7/2021 12:41  Ordering Phy(s): LAMIN MAK    For improved result formatting, select 'View Enhanced Report Format' under   Linked Documents section.    SPECIMEN/STAIN PROCESS:  Thin Prep Pap Screen - GICH (ThinPrep)       Pap Stain (GICH) x 1    SOURCE:  Cervical, endocervical  ----------------------------------------------------------------   Thin Prep Pap Screen - GICH (ThinPrep)  SPECIMEN ADEQUACY:  Satisfactory for evaluation.  -Transformation zone component present.    CYTOLOGIC INTERPRETATION:    Negative for intraepithelial lesion or malignancy         Organism(s):  -Shift in sadaf suggestive of bacterial vaginosis.    Electronically signed out by:  SILVANO Beckford (ASCP)    Processed and screened at [facility]    CLINICAL HISTORY:  LMP: n/a  Post Menopausal, Previous normal pap  Date of Last Pap: 2017,    Papanic                          olaou Test Limitations:  Cervical cytology is a screening test with   limited sensitivity; regular  screening is critical for cancer prevention; Pap tests are primarily   effective for the diagnosis/prevention of  squamous cell carcinoma, not adenocarcinomas or other cancers.    TESTING LAB LOCATION:  [facility]  [address] [place] [id number] [phone]    COLLECTION SITE:  Client:  [facility]  Location: [facility] ()         Color Urine 03/31/2021 Light Yellow   Final     Appearance Urine 03/31/2021 Clear   Final     Glucose Urine 03/31/2021 Negative  NEG^Negative mg/dL Final     Bilirubin Urine 03/31/2021 Negative  NEG^Negative Final     Ketones Urine 03/31/2021 Negative  NEG^Negative mg/dL Final     Specific Gravity Urine 03/31/2021 1.013  1.003 - 1.035 Final     Blood Urine 03/31/2021 Negative  NEG^Negative Final     pH Urine 03/31/2021 6.5  5.0 - 7.0 pH Final     Protein Albumin Urine 03/31/2021 Negative  NEG^Negative mg/dL Final     Urobilinogen mg/dL 03/31/2021 Normal  0.0 - 2.0 mg/dL Final     Nitrite Urine 03/31/2021 Negative  NEG^Negative Final     Leukocyte Esterase Urine 03/31/2021 Negative  NEG^Negative Final     Source 03/31/2021 Midstream Urine   Final     Hemoglobin A1C 03/31/2021 5.3  4.0 - 6.0 % Final     Bilirubin  Direct 03/31/2021 0.2  0.0 - 0.2 mg/dL Final     Bilirubin Total 03/31/2021 1.3* 0.3 - 1.0 mg/dL Final     Albumin 03/31/2021 4.2  3.5 - 5.7 g/dL Final     Protein Total 03/31/2021 7.4  6.4 - 8.9 g/dL Final     Alkaline Phosphatase 03/31/2021 106* 34 - 104 U/L Final     ALT 03/31/2021 127* 7 - 52 U/L Final     AST 03/31/2021 86* 13 - 39 U/L Final     WBC 03/31/2021 6.6  4.0 - 11.0 10e9/L Final     RBC Count 03/31/2021 4.67  3.8 - 5.2 10e12/L Final     Hemoglobin 03/31/2021 15.5  11.7 - 15.7 g/dL Final     Hematocrit 03/31/2021 45.8  35.0 - 47.0 % Final     MCV 03/31/2021 98  78 - 100 fl Final     MCH 03/31/2021 33.2* 26.5 - 33.0 pg Final     MCHC 03/31/2021 33.8  31.5 - 36.5 g/dL Final     RDW 03/31/2021 12.6  10.0 - 15.0 % Final     Platelet Count 03/31/2021 112* 150 - 450 10e9/L Final   Office Visit on 03/18/2021   Component Date Value Ref Range Status     Magnesium 03/18/2021 1.6* 1.9 - 2.7 mg/dL Final     Thyrotropin 03/18/2021 3.66  0.34 - 5.60 IU/mL Final     WBC 03/18/2021 5.9  4.0 - 11.0 10e9/L Final     RBC Count 03/18/2021 4.58  3.8 - 5.2 10e12/L Final     Hemoglobin 03/18/2021 15.2  11.7 - 15.7 g/dL Final     Hematocrit 03/18/2021 44.3  35.0 - 47.0 % Final     MCV 03/18/2021 97  78 - 100 fl Final     MCH 03/18/2021 33.2* 26.5 - 33.0 pg Final     MCHC 03/18/2021 34.3  31.5 - 36.5 g/dL Final     RDW 03/18/2021 12.5  10.0 - 15.0 % Final     Platelet Count 03/18/2021 87* 150 - 450 10e9/L Final     Diff Method 03/18/2021 Automated Method   Final     % Neutrophils 03/18/2021 66.7  % Final     % Lymphocytes 03/18/2021 23.0  % Final     % Monocytes 03/18/2021 7.7  % Final     % Eosinophils 03/18/2021 1.7  % Final     % Basophils 03/18/2021 0.7  % Final     % Immature Granulocytes 03/18/2021 0.2  % Final     Absolute Neutrophil 03/18/2021 3.9  1.6 - 8.3 10e9/L Final     Absolute Lymphocytes 03/18/2021 1.4  0.8 - 5.3 10e9/L Final     Absolute Monocytes 03/18/2021 0.5  0.0 - 1.3 10e9/L Final     Absolute Eosinophils  03/18/2021 0.1  0.0 - 0.7 10e9/L Final     Absolute Basophils 03/18/2021 0.0  0.0 - 0.2 10e9/L Final     Abs Immature Granulocytes 03/18/2021 0.0  0 - 0.4 10e9/L Final     Sodium 03/18/2021 136  134 - 144 mmol/L Final     Potassium 03/18/2021 4.0  3.5 - 5.1 mmol/L Final     Chloride 03/18/2021 99  98 - 107 mmol/L Final     Carbon Dioxide 03/18/2021 28  21 - 31 mmol/L Final     Anion Gap 03/18/2021 9  3 - 14 mmol/L Final     Glucose 03/18/2021 108* 70 - 105 mg/dL Final     Urea Nitrogen 03/18/2021 16  7 - 25 mg/dL Final     Creatinine 03/18/2021 0.77  0.60 - 1.20 mg/dL Final     GFR Estimate 03/18/2021 76  >60 mL/min/[1.73_m2] Final     GFR Estimate If Black 03/18/2021 >90  >60 mL/min/[1.73_m2] Final     Calcium 03/18/2021 9.9  8.6 - 10.3 mg/dL Final     Bilirubin Total 03/18/2021 0.9  0.3 - 1.0 mg/dL Final     Albumin 03/18/2021 4.4  3.5 - 5.7 g/dL Final     Protein Total 03/18/2021 7.3  6.4 - 8.9 g/dL Final     Alkaline Phosphatase 03/18/2021 92  34 - 104 U/L Final     ALT 03/18/2021 99* 7 - 52 U/L Final     AST 03/18/2021 93* 13 - 39 U/L Final     Cholesterol 03/18/2021 187  <200 mg/dL Final     Triglycerides 03/18/2021 95  <150 mg/dL Final     HDL Cholesterol 03/18/2021 75  23 - 92 mg/dL Final     LDL Cholesterol Calculated 03/18/2021 93  <100 mg/dL Final     Non HDL Cholesterol 03/18/2021 112  <130 mg/dL Final     Interpretation ECG 03/18/2021 Click View Image link to view waveform and result   Final-Edited          ASSESSMENT:   Charleen Huddleston presents for cardiology evaluation with equivocal stress test and racing heart palpitations.  She has a history of hypertension, hyperlipidemia, prediabetes, SLE, ongoing tobacco abuse, elevated liver enzymes, hepatic steatosis and alcohol-related thrombocytopenia.  Patient does have a family history of structural heart disease, mitral disease in her father and sister.  Today she describes the chest pain mainly only occurring with palpitations and describes as a sharp  pain.  No radiation to the arm, jaw, neck or back.  No reports of lightheadedness or syncope.  No edema.  Mild dyspnea.  Recent heart improved with recently starting metoprolol.  She came back on tobacco use, currently down to half a pack per day.  No ETOH use since April 2021.    1. Rapid heart rate  2. Equivocal stress test  3. Liver fibrosis  4. Essential hypertension  5. Elevated LFTs  6. Lupus (H)  7. Nicotine use disorder  8. Hepatic steatosis  9. Prediabetes  10. Mixed hyperlipidemia  11. Thrombocytopenia (H)      PLAN:   1. Patient with recent racing heart and HTN. Symptoms of racing heart improved with starting Toprol XL 25 mg daily, has been feeling good. No symptoms concerning for exertional angina. Atypical sharp chest pains where described as only occurring with palpitations. No increased exertional dyspnea.   2. Reviewed recent stress echocardiogram on 5/4/2021 which was submaximal, only 80% of the maximum predicted heart rate achieved. No evidence of stress induced myocardial ischemia or past infarct. The LVEF was 55 to 60% at rest and increased appropriately to greater than 70% after exercise with appropriate decrease in LV cavity size.  Normal heart rate response to exercise.  Hypertensive response to exercise.  No anginal symptoms were endorsed during the study.  Doppler exam with no significant valvular abnormalities.  Her resting ECG revealed nonspecific ST and T wave changes.  Tracings throughout exercise without ectopy of significance.  There was some slight ST depression at almost 1 mm in the inferior and lateral leads with peak exercise.  In the recovery phase, no ectopy and continued ST changes were seen.  No evidence of exercise-induced dysrhythmia.  Inconclusive EKG portion of the stress test for exercise-induced myocardial ischemia.  3. ECG changes <2mm ST depression, nondiabetic. Suspected the slight ST changes secondary to hypertensive response to exercise.   4. She does not endorse any  anginal symptoms today. We did discuss further testing for ischemia be considered if increased symptoms. At that point I would recommended NM MPI Lexiscan stress test to assess for ischemia.  Patient would like to hold off on further testing at this time.  Admits that she has been busy with her recent GI work-up.  5. She has been evaluated by GI with recently identified elevated liver enzymes and chronic thrombocytopenia. She has completely cut out ETOH use. FibroScan revealed a F4 fibrosis.    6. TTE reviewed with patient today from 4/16/2021 revealing normal global and regional LV function, LVEF 60 to 65%.  Borderline concentric wall thickening of the LV suggestive of mild LVH.  No regional wall motion abnormalities.  Normal RV size and function.  Both atria appeared normal with atrial septum intact.  No hemodynamically significant valvular abnormalities.  No pulmonary hypertension.  No pericardial effusion.  7. Recommended tight BP control with LVH, I would like to see pressures 130/80 or less. Pressure have improved since starting Toprol XL. She is also working on weight loss and  Sodium restriction.   8. ZIO reviewed (4/20-5/2/21) revealing underlying sinus rhythm.  Average heart rate 76 bpm.  The heart rate ranged from 53 to 116 bpm.  No patient triggered events.  3 nonsustained runs of SVT occurred with the longest lasting 5 beats.  No VT, AF/AFL, symptomatic bradycardia, pauses, second-degree Mobitz type II or third-degree AV block.  Rare SVE and VE, less than 1%.  Ventricular bigeminy was present lasting up to 5.1 seconds.  9. She will continue on Toprol XL 25 mg daily with good suppression of rapid palpitations, no symptomatic patient triggered events on above ZIO.  10.  Labs reviewed without anemia, stable electrolytes and stable thyroid function.  11.  Patient will continue to monitor symptoms as discussed, she will follow-up with cardiology in 6 months, certainly sooner if needed.      Thank you for  allowing me to participate in the care of your patient. Please do not hesitate to contact me if you have any questions.     Total time 80 minutes on date of encounter spent reviewing records, face-to-face time obtaining diet, physical exam, reviewing results of recent testing and counseling on the above diagnoses and recommended plan of care.    Bianca Whitney, APRN CNP [facility]

## 2021-06-17 LAB — INTERPRETATION ECG - MUSE: NORMAL

## 2021-06-17 RX ORDER — LOSARTAN POTASSIUM AND HYDROCHLOROTHIAZIDE 12.5; 5 MG/1; MG/1
1 TABLET ORAL DAILY
Qty: 90 TABLET | Refills: 2 | Status: SHIPPED | OUTPATIENT
Start: 2021-06-17 | End: 2021-12-15

## 2021-06-17 NOTE — TELEPHONE ENCOUNTER
Prescription approved per [facility] Refill Protocol.  LOV: 3/31/2021  Overridden by Elisa Doherty NP on Mar 23, 2021 9:34 AM   Allergy/Contraindication   1. LISINOPRIL [Level: Cross-sensitive Class Match] [Reason: Benefit outweighs risk     Amelia Lopez RN on 6/17/2021 at 11:09 AM

## 2021-09-01 ENCOUNTER — OFFICE VISIT (OUTPATIENT)
Dept: FAMILY MEDICINE | Facility: OTHER | Age: 61
End: 2021-09-01
Attending: NURSE PRACTITIONER
Payer: COMMERCIAL

## 2021-09-01 VITALS
HEART RATE: 62 BPM | SYSTOLIC BLOOD PRESSURE: 114 MMHG | OXYGEN SATURATION: 97 % | WEIGHT: 175.13 LBS | DIASTOLIC BLOOD PRESSURE: 58 MMHG | HEIGHT: 62 IN | BODY MASS INDEX: 32.23 KG/M2 | TEMPERATURE: 97.6 F

## 2021-09-01 DIAGNOSIS — B96.89 BACTERIAL SINUSITIS: Primary | ICD-10-CM

## 2021-09-01 DIAGNOSIS — J32.9 BACTERIAL SINUSITIS: Primary | ICD-10-CM

## 2021-09-01 PROCEDURE — 99213 OFFICE O/P EST LOW 20 MIN: CPT | Performed by: PHYSICIAN ASSISTANT

## 2021-09-01 ASSESSMENT — MIFFLIN-ST. JEOR: SCORE: 1307.61

## 2021-09-01 ASSESSMENT — PAIN SCALES - GENERAL: PAINLEVEL: MODERATE PAIN (5)

## 2021-09-01 NOTE — PROGRESS NOTES
ASSESSMENT/PLAN:    Differential Diagnoses: sinusitis, herpetic neuralgia    I have reviewed the nursing notes.  I have reviewed the findings, diagnosis, plan and need for follow up with the patient.    1. Bacterial sinusitis  - amoxicillin-clavulanate (AUGMENTIN) 875-125 MG tablet; Take 1 tablet by mouth 2 times daily for 7 days  Dispense: 14 tablet; Refill: 0  -Vital signs stable. PE consistent with sinusitis. Discussed with patient that we recommend: to dry out congestion with flonase (1spray in both nostrils 2x daily for 3-5 days) and pseudoephedrine (1-2 tabs every 4-6 hrs for 3-5 days) unless contraindicated, use a saline spray/Neti Pot/sinus flush (Mario Med Sinus Rinse) 2-3 times daily to irrigate sinuses/mucosal tissue. This dilutes and moves secretions. Alternate Tylenol or ibuprofen for pain and fevers - alternate every 4 hours as needed. Recommend plenty of fluids and rest as needed. Discussed that if a smoker, tobacco cessation recommended. Antibiotic prescribed as symptoms present > 10 days, Augmentin prescribed and recommend to take the entire course of antibiotic, discussed side effect profile of prescribed medications. Patient is in agreement and understanding of the above treatment plan. All questions and concerns were addressed and answered to patient's satisfaction. AVS reviewed with patient.    Discussed cannot rule out post herpetic neuralgia, even at different side of herpes zoster involvement. Patient has no active ophthalmic or auricular lesions that I can see at this time.      Discussed warning signs/symptoms indicative of need to f/u    Follow up if symptoms persist or worsen or concerns    I explained my diagnostic considerations and recommendations to the patient, who voiced understanding and agreement with the treatment plan. All questions were answered. We discussed potential side effects of any prescribed or recommended therapies, as well as expectations for response to  "treatments.    Roberta Brown PA-C  9/1/2021  2:23 PM    HPI:    Charleen Huddleston is a 61 year old female  who presents to [facility] today for concerns of sinus infection x 3 weeks. She saw her dentist today and was told that normal XR and dental exam and may be due to sinus infection. She was on the road in  over the last few weeks and just returned home. She notes pain extends from eye on left side and extends up to forehead into head on this side. She notes fullness/puffiness to eyelid on left side as well.     She was in [place] and thought she got bitten by a spider on inner thigh, using cortisone for this. Seen on 8/6 due to burning and \"boiling\" up sensation - she was told she has shingles (7 spots). She was on antiviral x 1 week and did well on this. She notes her head/sinus pain began right after this.     Symptoms:   Location: left maxillary, left frontal  Nasal congestion: Yes  Purulent nasal discharge: No  Headache: Yes  Facial pain: Yes   Cough: No  Tooth pain/symptoms: Yes  Myalgias: No  Presence of fever: No   Halitosis: No   Anosmia: No    Metallic taste in the mouth: No     Treatments tried: OTC meds with no improvement.     History of similar symptoms: No  Prior workup: No    PCP: CHICO Doherty    Past Medical History:   Diagnosis Date     Bacterial pneumonia 02/02/2010     Bitten by dog 09/2012    hospitalized     Chronic sinusitis     Chronic Sinusitis     Nonrheumatic mitral valve insufficiency 02/2005    Trace Mitral Regurgitation, Echo.  Does not require SBE     Other shoulder lesions, left shoulder     L shoulder RTC tendonitis     Personal history of other medical treatment (CODE)     prophylaxis     Personal history of other medical treatment (CODE) 03/11/2005    Echocardiogram 3/11/05 for family history of mitral valve and aortic valve replacement.  Trace mitral regurgitation.  Mitral valve appeared normal.  Tricuspid aortic valve without AI or AS.  No focal wall motion abnormalities. "  Ejection fraction 72% 3/12/05.     Primary osteoarthritis of right knee     No Comments Provided     Tobacco use     quit 2012     Past Surgical History:   Procedure Laterality Date     ARTHROSCOPY KNEE Right 2001    Dr. 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     ARTHROSCOPY SHOULDER  2012,Left, supraspinatus repair     COLONOSCOPY  01/10/2013    hyperplastic, 1/10/23     ECHOCARDIOGRAM INTRAOPERATIVE IN OR  2005    for family history of mitral valve and aortic valve replacement.  Trace mitral regurgitation.  Mitral valve appeared normal.  Tricuspid aortic valve without AI or AS.  No focal wall motion abnormalities.  Ejection fraction 72%     OTHER SURGICAL HISTORY  2006    PREMALIG/BENIGN SKIN LESION EXCISION,Excision of a benign verrucose keratosis with actinic damage and lentiginous change.     OTHER SURGICAL HISTORY  2006    [place] SODIUM HYALONURATE PER INJECTION,Synvisc injection     TONSILLECTOMY      No Comments Provided     Social History     Tobacco Use     Smoking status: Current Every Day Smoker     Packs/day: 0.75     Years: 45.00     Pack years: 33.75     Types: Cigarettes     Last attempt to quit: 10/1/2012     Years since quittin.9     Smokeless tobacco: Never Used   Substance Use Topics     Alcohol use: Not Currently     Alcohol/week: 3.0 standard drinks     Current Outpatient Medications   Medication Sig Dispense Refill     atorvastatin (LIPITOR) 20 MG tablet TAKE 1 TABLET (20 MG) BY MOUTH AT BEDTIME 90 tablet 3     losartan-hydrochlorothiazide (HYZAAR) 50-12.5 MG tablet TAKE 1 TABLET BY MOUTH DAILY 90 tablet 2     metoprolol succinate ER (TOPROL-XL) 25 MG 24 hr tablet Take 1 tablet (25 mg) by mouth daily 90 tablet 3     omeprazole (PRILOSEC) 20 MG DR capsule TAKE 1 CAPSULE BY MOUTH EVERY DAY BEFORE A MEAL 90 capsule 2     Allergies   Allergen Reactions     Lisinopril      cough     Past medical history, past surgical history,  "current medications and allergies reviewed and accurate to the best of my knowledge.      ROS:  Refer to HPI    /58 (BP Location: Right arm, Patient Position: Sitting, Cuff Size: Adult Regular)   Pulse 62   Temp 97.6  F (36.4  C) (Tympanic)   Ht 1.575 m (5' 2\")   Wt 79.4 kg (175 lb 2 oz)   LMP  (LMP Unknown)   SpO2 97%   Breastfeeding No   BMI 32.03 kg/m      EXAM:  General Appearance: Well appearing 61-year old female, appropriate appearance for age. No acute distress  Ears: Left TM intact, translucent with bony landmarks appreciated, no erythema, no effusion, no bulging, no purulence.  Right TM intact, translucent with bony landmarks appreciated, no erythema, no effusion, no bulging, no purulence. Bilateral ear canals clear without signs of herpetic involvement/lesions.  Normal external ears, non tender.  Eyes: normal fundoscopic examination. Conjunctivae normal without erythema or irritation, corneas clear, no drainage or crusting, no eyelid swelling, pupils equal   Orophayrnx: moist mucous membranes, posterior pharynx without erythema, tonsils without hypertrophy, no erythema, no exudates or petechiae, no post nasal drip seen, no trismus, voice clear.    Sinuses:  No sinus tenderness upon palpation of the frontal or maxillary sinuses  Nose:  Bilateral nares: no erythema, no edema, no drainage or congestion   Neck: supple without adenopathy  Respiratory: normal chest wall and respirations.  Normal effort.  Clear to auscultation bilaterally, no wheezing, crackles or rhonchi.  No increased work of breathing.  No cough appreciated.  Cardiac: RRR with no murmurs  Dermatological: no rashes noted of exposed skin - herpetic lesions in L1 dermatome appear to have resolved without complication  Psychological: normal affect, alert, oriented, and pleasant.     Labs:  None     Xray:  None     "

## 2021-09-01 NOTE — NURSING NOTE
Patient presents to clinic experiencing left side headache rated 5, sinus pressure and left eye swelling for past 3 weeks.  Medication Reconciliation: complete    Dorothea Castorena LPN

## 2021-09-01 NOTE — NURSING NOTE
The ear canal was irrigated withbody-temperature tap water with the jet of water directed superiorly.  The ear canal was then re-examined and cleared of the impaction.  The patient tolerated the procedure well.  Jovanna Delgado LPN ....................9/1/2021   3:27 PM

## 2021-09-01 NOTE — PATIENT INSTRUCTIONS
Please refer to your AVS for follow up and pain/symptoms management recommendations (I.e.: medications, helpful conservative treatment modalities, appropriate follow up if need to a specialist or family practice, etc.). Please return to urgent care if your symptoms change or worsen.     Discharge instructions:  -If you were prescribed a medication(s), please take this as prescribed/directed  -Monitor your symptoms, if changing/worsening, return to UC/ER or PCP for follow up    Sinus Infection:   1. Dry out congestion with flonase (1spray in both nostrils 2x daily for 3-5 days) and pseudoephedrine (1-2 tabs every 4-6 hrs for 3-5 days) unless contraindicated     2. Use a saline spray/Neti Pot/sinus flush (Mario Med Sinus Rinse) 2-3 times daily to irrigate sinuses/mucosal tissue. This dilutes and moves secretions.     3. Tylenol or ibuprofen for pain and fevers - alternate every 4 hours as needed. I.e.: Ibuprofen at 8am, Tylenol 12pm, Ibuprofen 4pm    -Daily maximum of Tylenol is 4000mg (recommend staying under 3000mg)   -Daily maximum of Ibuprofen is 1200mg (take no more than six 200mg pills a day)    4. Plenty of fluids and rest as needed.     5. Chew, yawn and speak to help eustachian tubes drain.     * If you are a smoker, try to quit *     - Consider the following over-the-counter products if you are older than 1 year and not pregnant: honey/chestal for cough relief and sambucus/elderberry for viral upper-respiratory symptoms.

## 2021-09-21 ENCOUNTER — OFFICE VISIT (OUTPATIENT)
Dept: INTERNAL MEDICINE | Facility: OTHER | Age: 61
End: 2021-09-21
Attending: NURSE PRACTITIONER
Payer: COMMERCIAL

## 2021-09-21 VITALS
HEART RATE: 69 BPM | RESPIRATION RATE: 16 BRPM | DIASTOLIC BLOOD PRESSURE: 70 MMHG | TEMPERATURE: 97.5 F | SYSTOLIC BLOOD PRESSURE: 136 MMHG | OXYGEN SATURATION: 98 % | WEIGHT: 177 LBS | BODY MASS INDEX: 32.37 KG/M2

## 2021-09-21 DIAGNOSIS — G44.59 OTHER COMPLICATED HEADACHE SYNDROME: ICD-10-CM

## 2021-09-21 DIAGNOSIS — R20.8 ALLODYNIA: Primary | ICD-10-CM

## 2021-09-21 DIAGNOSIS — K76.0 NAFLD (NONALCOHOLIC FATTY LIVER DISEASE): ICD-10-CM

## 2021-09-21 LAB
ALBUMIN SERPL-MCNC: 4.5 G/DL (ref 3.5–5.7)
ALP SERPL-CCNC: 71 U/L (ref 34–104)
ALT SERPL W P-5'-P-CCNC: 30 U/L (ref 7–52)
ANION GAP SERPL CALCULATED.3IONS-SCNC: 13 MMOL/L (ref 3–14)
AST SERPL W P-5'-P-CCNC: 30 U/L (ref 13–39)
BILIRUB SERPL-MCNC: 1.4 MG/DL (ref 0.3–1)
BUN SERPL-MCNC: 14 MG/DL (ref 7–25)
CALCIUM SERPL-MCNC: 9.8 MG/DL (ref 8.6–10.3)
CHLORIDE BLD-SCNC: 103 MMOL/L (ref 98–107)
CO2 SERPL-SCNC: 22 MMOL/L (ref 21–31)
CREAT SERPL-MCNC: 0.77 MG/DL (ref 0.6–1.2)
CRP SERPL-MCNC: 1.4 MG/L
ERYTHROCYTE [DISTWIDTH] IN BLOOD BY AUTOMATED COUNT: 13 % (ref 10–15)
ERYTHROCYTE [SEDIMENTATION RATE] IN BLOOD BY WESTERGREN METHOD: 7 MM/HR (ref 0–30)
GFR SERPL CREATININE-BSD FRML MDRD: 83 ML/MIN/1.73M2
GLUCOSE BLD-MCNC: 91 MG/DL (ref 70–105)
HCT VFR BLD AUTO: 44 % (ref 35–47)
HGB BLD-MCNC: 15.3 G/DL (ref 11.7–15.7)
HOLD SPECIMEN: NORMAL
MCH RBC QN AUTO: 32.7 PG (ref 26.5–33)
MCHC RBC AUTO-ENTMCNC: 34.8 G/DL (ref 31.5–36.5)
MCV RBC AUTO: 94 FL (ref 78–100)
PLATELET # BLD AUTO: 90 10E3/UL (ref 150–450)
POTASSIUM BLD-SCNC: 3.9 MMOL/L (ref 3.5–5.1)
PROT SERPL-MCNC: 7.1 G/DL (ref 6.4–8.9)
RBC # BLD AUTO: 4.68 10E6/UL (ref 3.8–5.2)
SODIUM SERPL-SCNC: 138 MMOL/L (ref 134–144)
WBC # BLD AUTO: 4.4 10E3/UL (ref 4–11)

## 2021-09-21 PROCEDURE — 99214 OFFICE O/P EST MOD 30 MIN: CPT | Performed by: NURSE PRACTITIONER

## 2021-09-21 PROCEDURE — 36415 COLL VENOUS BLD VENIPUNCTURE: CPT | Mod: ZL | Performed by: NURSE PRACTITIONER

## 2021-09-21 PROCEDURE — 85027 COMPLETE CBC AUTOMATED: CPT | Mod: ZL | Performed by: NURSE PRACTITIONER

## 2021-09-21 PROCEDURE — 85652 RBC SED RATE AUTOMATED: CPT | Mod: ZL | Performed by: NURSE PRACTITIONER

## 2021-09-21 PROCEDURE — 86140 C-REACTIVE PROTEIN: CPT | Mod: ZL | Performed by: NURSE PRACTITIONER

## 2021-09-21 PROCEDURE — 82947 ASSAY GLUCOSE BLOOD QUANT: CPT | Mod: ZL | Performed by: NURSE PRACTITIONER

## 2021-09-21 ASSESSMENT — ENCOUNTER SYMPTOMS
HEADACHES: 1
FATIGUE: 0
SINUS PAIN: 0
LIGHT-HEADEDNESS: 0
NAUSEA: 0
MYALGIAS: 0
JOINT SWELLING: 0
FACIAL SWELLING: 0
DIZZINESS: 0
ARTHRALGIAS: 0
SORE THROAT: 0
NUMBNESS: 0
TROUBLE SWALLOWING: 0
SINUS PRESSURE: 0
WEAKNESS: 0
UNEXPECTED WEIGHT CHANGE: 0
ADENOPATHY: 0
SHORTNESS OF BREATH: 0
CHILLS: 0
CONFUSION: 0
FEVER: 0
NECK STIFFNESS: 0
NECK PAIN: 0
WOUND: 0
CHEST TIGHTNESS: 0
DYSPHORIC MOOD: 0
VOMITING: 0

## 2021-09-21 ASSESSMENT — PAIN SCALES - GENERAL: PAINLEVEL: MODERATE PAIN (5)

## 2021-09-21 NOTE — NURSING NOTE
"Chief Complaint   Patient presents with     Follow Up             FOOD SECURITY SCREENING QUESTIONS  Hunger Vital Signs:  Within the past 12 months we worried whether our food would run out before we got money to buy more. Never  Within the past 12 months the food we bought just didn't last and we didn't have money to get more. Never  Annemarie White LPN 9/21/2021 1:56 PM      Initial /70 (BP Location: Right arm, Patient Position: Sitting, Cuff Size: Adult Regular)   Pulse 69   Temp 97.5  F (36.4  C) (Tympanic)   Resp 16   Wt 80.3 kg (177 lb)   LMP  (LMP Unknown)   SpO2 98%   BMI 32.37 kg/m   Estimated body mass index is 32.37 kg/m  as calculated from the following:    Height as of 9/1/21: 1.575 m (5' 2\").    Weight as of this encounter: 80.3 kg (177 lb).  Medication Reconciliation: complete    Annemarie White LPN  "

## 2021-09-21 NOTE — PROGRESS NOTES
ASSESSMENT:    ICD-10-CM    1. Allodynia  R20.8 CBC with platelets     Comprehensive metabolic panel     Erythrocyte sedimentation rate auto     CRP inflammation     MR Brain w/o & w Contrast     CBC with platelets     Comprehensive metabolic panel     Erythrocyte sedimentation rate auto     CRP inflammation   2. Other complicated headache syndrome  G44.59 MR Brain w/o & w Contrast   3. Lupus (H)  M32.9    4. NAFLD (nonalcoholic fatty liver disease)  K76.0        PLAN:  At this time will do blood work to include a sed rate, CRP, CBC and chemistry panel.  We will also obtain MRI of brain.  I would like her to get an eye examination as soon as able.  If she develops severe head pain, visual changes, etc. she needs to be seen in the emergency department.  We will follow-up with her in 2 weeks, sooner with worsening.  She will contact her gastroenterologist to update them of her current clinical symptoms and also to find out what they recommend she take for discomfort.    SUBJECTIVE:    Patient is seen today for a headache which began on August 16.  It is directly along the left side of her head next to the midline.  It radiates down into the left frontal sinus.  She does not have any eye pain or visual changes.  She believes that her left pupil is smaller than the right pupil.  She has history of astigmatism.  She had a normal eye exam last spring.  She has not had any recent eye appointments.  She states this all began when she was out traveling with her  in [place].  She developed shingles at the inner left thigh.  [place] with a 10-day course of Valtrex.  That resolved.  That was on August 2, 2021.  She was then seen at urgent care center on September 1, 2021 in [place].  She was treated for bacterial sinusitis with Augmentin twice daily for 7 days with no improvement.  She has NAF LD and has stopped drinking alcohol.  She is followed by  gastroenterology.  She has been losing weight since her visit in April.  She has not really noticed that she has had increased weight loss since this all began in August.    She also has a remote history of lupus many years ago but is no longer followed by rheumatology.  Mother had PMR in her 50s.    PROBLEM LIST:  Patient Active Problem List   Diagnosis     Elevated LFTs     HTN (hypertension)     Lupus (H)     Nicotine use disorder     Obesity     Disorder of bursae and tendons in shoulder region     Other affections of shoulder region, not elsewhere classified     Mixed hyperlipidemia     Hyperglycemia     Pulmonary nodules     NAFLD (nonalcoholic fatty liver disease)     Prediabetes     Gastroesophageal reflux disease without esophagitis     Mild intermittent asthma without complication     Allodynia     Other complicated headache syndrome     PAST MEDICAL HISTORY:  Past Medical History:   Diagnosis Date     Bacterial pneumonia 02/02/2010     Bitten by dog 09/2012    hospitalized     Chronic sinusitis     Chronic Sinusitis     Nonrheumatic mitral valve insufficiency 02/2005    Trace Mitral Regurgitation, Echo.  Does not require SBE     Other shoulder lesions, left shoulder     L shoulder RTC tendonitis     Personal history of other medical treatment (CODE)     prophylaxis     Personal history of other medical treatment (CODE) 03/11/2005    Echocardiogram 3/11/05 for family history of mitral valve and aortic valve replacement.  Trace mitral regurgitation.  Mitral valve appeared normal.  Tricuspid aortic valve without AI or AS.  No focal wall motion abnormalities.  Ejection fraction 72% 3/12/05.     Primary osteoarthritis of right knee     No Comments Provided     Tobacco use     quit 9/2012     SURGICAL HISTORY:  Past Surgical History:   Procedure Laterality Date     ARTHROSCOPY KNEE Right 08/2001    Dr. 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     ARTHROSCOPY SHOULDER  2012    2011 /  ,Left, supraspinatus repair     COLONOSCOPY  01/10/2013    hyperplastic, 1/10/23     ECHOCARDIOGRAM INTRAOPERATIVE IN OR  2005    for family history of mitral valve and aortic valve replacement.  Trace mitral regurgitation.  Mitral valve appeared normal.  Tricuspid aortic valve without AI or AS.  No focal wall motion abnormalities.  Ejection fraction 72%     OTHER SURGICAL HISTORY  2006    PREMALIG/BENIGN SKIN LESION EXCISION,Excision of a benign verrucose keratosis with actinic damage and lentiginous change.     OTHER SURGICAL HISTORY  2006    [place] SODIUM HYALONURATE PER INJECTION,Synvisc injection     TONSILLECTOMY      No Comments Provided       SOCIAL HISTORY:  Social History     Socioeconomic History     Marital status:      Spouse name: Not on file     Number of children: Not on file     Years of education: Not on file     Highest education level: Not on file   Occupational History     Not on file   Tobacco Use     Smoking status: Current Every Day Smoker     Packs/day: 0.75     Years: 45.00     Pack years: 33.75     Types: Cigarettes     Last attempt to quit: 10/1/2012     Years since quittin.9     Smokeless tobacco: Never Used   Vaping Use     Vaping Use: Never used   Substance and Sexual Activity     Alcohol use: Not Currently     Alcohol/week: 3.0 standard drinks     Drug use: No     Sexual activity: Yes     Partners: Male   Other Topics Concern     Not on file   Social History Narrative    .  Works at the school district.   works at [employer].      She has 3 grown children.    Lives on [address]. Quit smoking 2012     Social Determinants of Health     Financial Resource Strain:      Difficulty of Paying Living Expenses:    Food Insecurity:      Worried About Running Out of Food in the Last Year:      Ran Out of Food in the Last Year:    Transportation Needs:      Lack of Transportation (Medical):      Lack of Transportation (Non-Medical):    Physical Activity:       Days of Exercise per Week:      Minutes of Exercise per Session:    Stress:      Feeling of Stress :    Social Connections:      Frequency of Communication with Friends and Family:      Frequency of Social Gatherings with Friends and Family:      Attends [employer] Services:      Active Member of Clubs or Organizations:      Attends Club or Organization Meetings:      Marital Status:    Intimate Partner Violence:      Fear of Current or Ex-Partner:      Emotionally Abused:      Physically Abused:      Sexually Abused:      FAMILYHISTORY:  Family History   Problem Relation Age of Onset     Hyperlipidemia Mother         Hyperlipidemia,Hyperlipidemia     Other - See Comments Mother         Stroke,CVA 03/07     Heart Disease Mother         Heart Disease,aortic and mitral valve replacements, rheumatic fever     Mitral Valve Replacement Mother      Diabetes Father         Diabetes     Mitral Valve Replacement Father      Rheumatic fever Father      Diabetes Sister      Polycystic ovary syndrome Child      Thyroid Disease Child      Heart Disease Sister         Heart Disease,Valvular heart disease and diffuse athersclerosis on brain MRI     Other - See Comments Sister         rheumatic fever     Rheumatic fever Sister      Cerebrovascular Disease Sister      Other - See Comments Maternal Grandmother         Stroke,CVA     Prostate Cancer Paternal Grandfather         Cancer-prostate     Hypertension Sister      Hyperlipidemia Sister      CURRENT MEDICATIONS:   Current Outpatient Medications   Medication Sig Dispense Refill     atorvastatin (LIPITOR) 20 MG tablet TAKE 1 TABLET (20 MG) BY MOUTH AT BEDTIME 90 tablet 3     losartan-hydrochlorothiazide (HYZAAR) 50-12.5 MG tablet TAKE 1 TABLET BY MOUTH DAILY 90 tablet 2     metoprolol succinate ER (TOPROL-XL) 25 MG 24 hr tablet Take 1 tablet (25 mg) by mouth daily 90 tablet 3     omeprazole (PRILOSEC) 20 MG DR capsule TAKE 1 CAPSULE BY MOUTH EVERY DAY BEFORE A MEAL 90 capsule  2     ALLERGIES:  Lisinopril    REVIEW OF SYSTEMS:  Review of Systems   Constitutional: Negative for chills, fatigue, fever and unexpected weight change.   HENT: Negative for congestion, dental problem, ear pain, facial swelling, hearing loss, mouth sores, sinus pressure, sinus pain, sore throat and trouble swallowing.    Respiratory: Negative for chest tightness and shortness of breath.    Cardiovascular: Negative for chest pain.   Gastrointestinal: Negative for nausea and vomiting.   Musculoskeletal: Negative for arthralgias, joint swelling, myalgias, neck pain and neck stiffness.   Skin: Negative for rash and wound.   Neurological: Positive for headaches. Negative for dizziness, syncope, weakness, light-headedness and numbness.        She complains of a burning crawling type sensation going through her scalp.  It is mostly the scalp that is tender   Hematological: Negative for adenopathy.   Psychiatric/Behavioral: Negative for confusion and dysphoric mood.         OBJECTIVE:  /70 (BP Location: Right arm, Patient Position: Sitting, Cuff Size: Adult Regular)   Pulse 69   Temp 97.5  F (36.4  C) (Tympanic)   Resp 16   Wt 80.3 kg (177 lb)   LMP  (LMP Unknown)   SpO2 98%   BMI 32.37 kg/m    EXAM:   Pleasant female in no acute distress.  Affect normal.  Alert and oriented x4.  Sclera nonicteric.  Conjunctiva noninflamed.  EOMIs.  Pupils react to light symmetrically left pupil possibly smaller than the right pupil but hard to differentiate.  Fundi benign.  No conjunctivitis.  No pain with palpation to the temporal arteries.  Left scalp with tenderness but no lesions or rashes.  No pain with palpation to frontal or maxillary sinuses.  TMs clear.  Neck supple and without adenopathy.  Lung fields clear to auscultation.  Cardiovascular regular. No synovisis      Elisa Doherty, NAKUL

## 2021-09-27 ENCOUNTER — HOSPITAL ENCOUNTER (OUTPATIENT)
Dept: MRI IMAGING | Facility: OTHER | Age: 61
Discharge: HOME | End: 2021-09-27
Attending: NURSE PRACTITIONER | Admitting: NURSE PRACTITIONER
Payer: COMMERCIAL

## 2021-09-27 DIAGNOSIS — G44.59 OTHER COMPLICATED HEADACHE SYNDROME: ICD-10-CM

## 2021-09-27 DIAGNOSIS — R20.8 ALLODYNIA: ICD-10-CM

## 2021-09-27 PROCEDURE — A9575 INJ GADOTERATE MEGLUMI 0.1ML: HCPCS | Performed by: NURSE PRACTITIONER

## 2021-09-27 PROCEDURE — 70553 MRI BRAIN STEM W/O & W/DYE: CPT

## 2021-09-27 PROCEDURE — 255N000002 HC RX 255 OP 636: Performed by: NURSE PRACTITIONER

## 2021-09-27 RX ADMIN — GADOTERATE MEGLUMINE 16 ML: 376.9 INJECTION INTRAVENOUS at 14:04

## 2021-11-17 ENCOUNTER — HOSPITAL ENCOUNTER (OUTPATIENT)
Dept: ULTRASOUND IMAGING | Facility: OTHER | Age: 61
Discharge: HOME | End: 2021-11-17
Attending: PHYSICIAN ASSISTANT | Admitting: PHYSICIAN ASSISTANT
Payer: COMMERCIAL

## 2021-11-17 DIAGNOSIS — C22.0 HEPATOMA (H): ICD-10-CM

## 2021-11-17 PROCEDURE — 76705 ECHO EXAM OF ABDOMEN: CPT

## 2021-12-15 ENCOUNTER — OFFICE VISIT (OUTPATIENT)
Dept: CARDIOLOGY | Facility: OTHER | Age: 61
End: 2021-12-15
Attending: NURSE PRACTITIONER
Payer: COMMERCIAL

## 2021-12-15 VITALS
HEART RATE: 68 BPM | OXYGEN SATURATION: 96 % | SYSTOLIC BLOOD PRESSURE: 120 MMHG | WEIGHT: 182 LBS | RESPIRATION RATE: 16 BRPM | TEMPERATURE: 96.4 F | DIASTOLIC BLOOD PRESSURE: 72 MMHG | BODY MASS INDEX: 33.29 KG/M2

## 2021-12-15 DIAGNOSIS — R00.0 TACHYCARDIA: Primary | ICD-10-CM

## 2021-12-15 DIAGNOSIS — I10 ESSENTIAL HYPERTENSION: ICD-10-CM

## 2021-12-15 DIAGNOSIS — D69.6 THROMBOCYTOPENIA (H): ICD-10-CM

## 2021-12-15 DIAGNOSIS — R01.1 SYSTOLIC MURMUR: ICD-10-CM

## 2021-12-15 DIAGNOSIS — F17.200 NICOTINE USE DISORDER: ICD-10-CM

## 2021-12-15 DIAGNOSIS — I35.9 AORTIC VALVE CALCIFICATION: ICD-10-CM

## 2021-12-15 DIAGNOSIS — K74.00 LIVER FIBROSIS: ICD-10-CM

## 2021-12-15 DIAGNOSIS — E78.2 MIXED HYPERLIPIDEMIA: ICD-10-CM

## 2021-12-15 DIAGNOSIS — R73.03 PREDIABETES: ICD-10-CM

## 2021-12-15 DIAGNOSIS — R94.39 EQUIVOCAL STRESS TEST: ICD-10-CM

## 2021-12-15 PROCEDURE — 99214 OFFICE O/P EST MOD 30 MIN: CPT | Performed by: NURSE PRACTITIONER

## 2021-12-15 RX ORDER — ATORVASTATIN CALCIUM 20 MG/1
20 TABLET, FILM COATED ORAL AT BEDTIME
Qty: 90 TABLET | Refills: 3 | Status: SHIPPED | OUTPATIENT
Start: 2021-12-15 | End: 2022-10-05

## 2021-12-15 RX ORDER — LOSARTAN POTASSIUM AND HYDROCHLOROTHIAZIDE 12.5; 5 MG/1; MG/1
1 TABLET ORAL DAILY
Qty: 90 TABLET | Refills: 3 | Status: SHIPPED | OUTPATIENT
Start: 2021-12-15 | End: 2022-10-05

## 2021-12-15 RX ORDER — METOPROLOL SUCCINATE 25 MG/1
25 TABLET, EXTENDED RELEASE ORAL DAILY
Qty: 90 TABLET | Refills: 3 | Status: SHIPPED | OUTPATIENT
Start: 2021-12-15 | End: 2022-10-05

## 2021-12-15 ASSESSMENT — PAIN SCALES - GENERAL: PAINLEVEL: NO PAIN (0)

## 2021-12-15 NOTE — NURSING NOTE
"Chief Complaint   Patient presents with     Follow Up     6 month follow up        Initial /72 (BP Location: Right arm, Patient Position: Sitting, Cuff Size: Adult Regular)   Pulse 68   Temp (!) 96.4  F (35.8  C) (Tympanic)   Resp 16   Wt 82.6 kg (182 lb)   LMP  (LMP Unknown)   SpO2 96%   BMI 33.29 kg/m   Estimated body mass index is 33.29 kg/m  as calculated from the following:    Height as of 9/1/21: 1.575 m (5' 2\").    Weight as of this encounter: 82.6 kg (182 lb).  Meds Reconciled: complete  Pt is not on Aspirin  Pt is on a Statin  PHQ and/or SARAH reviewed. Pt referred to PCP/MH Provider as appropriate.    Jovanna Pat LPN      "

## 2021-12-15 NOTE — PATIENT INSTRUCTIONS
You were seen by  SMITHA Block CNP     1. We placed an order for you to have an echo done in 6 months, you will be called to schedule this.  We will call you with the results.     2. We refilled your medications.  No changes today.      You will follow up with [facility] in 1 year, sooner if needed.       Please call the cardiology office with problems, questions, or concerns at [phone].    If you experience chest pain, chest pressure, chest tightness, shortness of breath, fainting, lightheadedness, nausea, vomiting, or other concerning symptoms, please report to the Emergency Department or call 911. These symptoms may be emergent, and best treated in the Emergency Department.     Cardiology Nurses  DOROTEO Gallegos, BETTY MUÑOZ LPN  [facility] ([address])  [phone]

## 2021-12-15 NOTE — PROGRESS NOTES
[facility] HEART CARE   CARDIOLOGY CONSULT     Charleen Huddleston   [id number]  [facility] [address]      Elisa Doherty     Chief Complaint   Patient presents with     Follow Up     6 month follow up         HPI:   Ms. Huddleston is a 61 year old female who presents for cardiology follow-up to visit on 6/16/21 with equivocal stress test and racing heart palpitations.  She has a history of hypertension, hyperlipidemia, prediabetes, SLE, ongoing tobacco abuse, elevated liver enzymes, hepatic steatosis and alcohol-related thrombocytopenia.  Patient does have a family history of structural heart disease, mitral disease in her father and sister.    Patient reported symptoms of racing heart back in March 2021.  She noted the racing heart with a heart rate up to 150s on her Fitbit.  She did not describe any chest pain or pressure at that time, no increased dyspnea.  She had also been identified to be hypertensive, she had been on losartan hydrochlorothiazide 50-12.5 mg daily.    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 Her FibroScan was completed on 5/13/2021 revealing a F4 fibrosis. No hepatoma on recent US. She is working on weight loss and regular exercise, Mediterranean diet. She has also been abstinent of ETOH use.     Previous echocardiogram in November 2013 revealing normal LV size and function, estimated EF at 65% without regional wall motion abnormalities.  Normal RV size and function.  Mild LA enlargement.  Borderline LVH.  No significant valvular heart disease identified.  The aortic and mitral valves appeared structurally normal.    Last echocardiogram on 4/16/2021 revealing normal global and regional LV function, LVEF 60 to 65%.  Borderline concentric wall thickening of the LV.  No regional wall motion abnormalities.  Normal RV size and function.  Both atria appeared normal with atrial septum intact.  The mitral valve is normal in structure and function.  Mild  aortic valve calcification with a mean aortic valve pressure gradient of 10 mmHg and a calculated JANI 1.6 cm , RVSP 22.8 mmHg plus RAP, normal pulmonary artery systolic pressure.  No pericardial effusion.    An exercise stress test was ordered secondary to hypertension uncontrolled and racing heart.  This was performed as an exercise stress echocardiogram on 5/4/2021.  This was a submaximal, likely low risk exercise stress echocardiogram.  Patient only achieved 80% of the maximum predicted heart rate.  Normal LV size and function at baseline.  Mild LVH.  The LVEF was 55 to 60% at rest and increased appropriately to greater than 70% after exercise with appropriate decrease in LV cavity size.  Normal heart rate response to exercise.  Hypertensive response to exercise.  No anginal symptoms were endorsed during the study.  Doppler exam with no significant valvular abnormalities.  Her resting ECG revealed nonspecific ST and T wave changes.  Tracings throughout exercise without ectopy of significance.  There was some slight ST depression at almost 1 mm in the inferior and lateral leads with peak exercise.  In the recovery phase, no ectopy and continued ST changes were seen.  No evidence of exercise-induced dysrhythmia.  Inconclusive EKG portion of the stress test for exercise-induced myocardial ischemia.    ZIO monitor ordered for racing heart palpitations (4/20-5/2/21) revealing underlying sinus rhythm.  Average heart rate 76 bpm.  The heart rate ranged from 53 to 116 bpm.  No patient triggered events.  3 nonsustained runs of SVT occurred with the longest lasting 5 beats.  No VT, AF/AFL, symptomatic bradycardia, pauses, second-degree Mobitz type II or third-degree AV block.  Rare SVE and VE, less than 1%.  Ventricular bigeminy was present lasting up to 5.1 seconds.    Today she describes feeling well, working on dietary modification and regular fast paced walking for weight loss. Palpitations resolved. No chest pain or  pressure. Dyspnea improved. No edema. No ETOH use since April 2021. BP has been well controlled.     PAST MEDICAL HISTORY:   Past Medical History:   Diagnosis Date     Bacterial pneumonia 02/02/2010     Bitten by dog 09/2012    hospitalized     Chronic sinusitis     Chronic Sinusitis     Nonrheumatic mitral valve insufficiency 02/2005    Trace Mitral Regurgitation, Echo.  Does not require SBE     Other shoulder lesions, left shoulder     L shoulder RTC tendonitis     Personal history of other medical treatment (CODE)     prophylaxis     Personal history of other medical treatment (CODE) 03/11/2005    Echocardiogram 3/11/05 for family history of mitral valve and aortic valve replacement.  Trace mitral regurgitation.  Mitral valve appeared normal.  Tricuspid aortic valve without AI or AS.  No focal wall motion abnormalities.  Ejection fraction 72% 3/12/05.     Primary osteoarthritis of right knee     No Comments Provided     Tobacco use     quit 9/2012          FAMILY HISTORY:   Family History   Problem Relation Age of Onset     Hyperlipidemia Mother         Hyperlipidemia,Hyperlipidemia     Other - See Comments Mother         Stroke,CVA 03/07     Heart Disease Mother         Heart Disease,aortic and mitral valve replacements, rheumatic fever     Mitral Valve Replacement Mother      Diabetes Father         Diabetes     Mitral Valve Replacement Father      Rheumatic fever Father      Diabetes Sister      Polycystic ovary syndrome Child      Thyroid Disease Child      Heart Disease Sister         Heart Disease,Valvular heart disease and diffuse athersclerosis on brain MRI     Other - See Comments Sister         rheumatic fever     Rheumatic fever Sister      Cerebrovascular Disease Sister      Other - See Comments Maternal Grandmother         Stroke,CVA     Prostate Cancer Paternal Grandfather         Cancer-prostate     Hypertension Sister      Hyperlipidemia Sister           PAST SURGICAL HISTORY:   Past Surgical  History:   Procedure Laterality Date     ARTHROSCOPY KNEE Right 2001    Dr. 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     ARTHROSCOPY SHOULDER  2012    2011,Left, supraspinatus repair     COLONOSCOPY  01/10/2013    hyperplastic, 1/10/23     ECHOCARDIOGRAM INTRAOPERATIVE IN OR  2005    for family history of mitral valve and aortic valve replacement.  Trace mitral regurgitation.  Mitral valve appeared normal.  Tricuspid aortic valve without AI or AS.  No focal wall motion abnormalities.  Ejection fraction 72%     OTHER SURGICAL HISTORY  2006    PREMALIG/BENIGN SKIN LESION EXCISION,Excision of a benign verrucose keratosis with actinic damage and lentiginous change.     OTHER SURGICAL HISTORY  2006    TX SODIUM HYALONURATE PER INJECTION,Synvisc injection     TONSILLECTOMY      No Comments Provided          SOCIAL HISTORY:   Social History     Socioeconomic History     Marital status:      Spouse name: None     Number of children: None     Years of education: None     Highest education level: None   Occupational History     None   Social Needs     Financial resource strain: None     Food insecurity     Worry: None     Inability: None     Transportation needs     Medical: None     Non-medical: None   Tobacco Use     Smoking status: Current Every Day Smoker     Packs/day: 0.75     Years: 45.00     Pack years: 33.75     Types: Cigarettes     Last attempt to quit: 10/1/2012     Years since quittin.7     Smokeless tobacco: Never Used   Substance and Sexual Activity     Alcohol use: Yes     Alcohol/week: 3.0 standard drinks     Frequency: Monthly or less     Drug use: No     Sexual activity: Yes     Partners: Male   Lifestyle     Physical activity     Days per week: None     Minutes per session: None     Stress: None   Relationships     Social connections     Talks on phone: None     Gets together: None     Attends [employer] service: None     Active member of club  or organization: None     Attends meetings of clubs or organizations: None     Relationship status: None     Intimate partner violence     Fear of current or ex partner: None     Emotionally abused: None     Physically abused: None     Forced sexual activity: None   Other Topics Concern     None   Social History Narrative    .  Works at the school district.   works at [employer].      She has 3 grown children.    Lives on [address]. Quit smoking 9/2012          CURRENT MEDICATIONS:   Current Outpatient Medications   Medication     atorvastatin (LIPITOR) 20 MG tablet     losartan-hydrochlorothiazide (HYZAAR) 50-12.5 MG tablet     metoprolol succinate ER (TOPROL-XL) 25 MG 24 hr tablet     omeprazole (PRILOSEC) 20 MG DR capsule     No current facility-administered medications for this visit.       ALLERGIES:   Allergies   Allergen Reactions     Lisinopril      cough          ROS:   CONSTITUTIONAL: No reported fever or chills. No changes in weight.  ENT: No visual disturbance, ear ache, epistaxis or sore throat.   CARDIOVASCULAR: No recurrence of chest pains or chest pressure. Palpitations have completely resolved. No increased lower extremity edema.   RESPIRATORY: Dyspnea improved.  No cough, wheezing or hemoptysis.  No reports of orthopnea or PND.  GI: No reported abdominal pain, melena or hematochezia.  : No reported hematuria or dysuria.  NEUROLOGICAL: No lightheadedness, dizziness, syncope, ataxia, paresthesias or weakness.   HEMATOLOGIC: No history of anemia. No bleeding or excessive bruising. No history of blood clots.   MUSCULOSKELETAL: No new joint pain or swelling, no muscle pain.  ENDOCRINOLOGIC: No temperature intolerance. No hair or skin changes.  SKIN: No abnormal rashes or sores, no unusual itching.  PSYCHIATRIC: No history of depression or anxiety. No changes in mood, feeling down or anxious. No changes in sleep.      PHYSICAL EXAM:   /72 (BP Location: Right arm, Patient Position:  Sitting, Cuff Size: Adult Regular)   Pulse 68   Temp (!) 96.4  F (35.8  C) (Tympanic)   Resp 16   Wt 82.6 kg (182 lb)   LMP  (LMP Unknown)   SpO2 96%   BMI 33.29 kg/m    GENERAL: The patient is a well-developed, well-nourished, in no apparent distress.  HEENT: Head is normocephalic and atraumatic. Eyes are symmetrical with normal visual tracking. No icterus, no xanthelasmas. Nares appeared normal without nasal drainage. Mucous membranes are moist, no cyanosis.  NECK: Supple. No cervical bruits, JVP not visible.   CHEST/ LUNGS: Lungs clear to auscultation, no rales, rhonchi or wheezes, no use of accessory muscles, no retractions, respirations unlabored and normal respiratory rate.   CARDIO: Regular rate and rhythm normal with S1 and S2, presence of grade II! systolic murmur with radiation to carotids, no click or rub.   ABD: Abdomen is nondistended.   EXTREMITIES: No peripheral edema.  MUSCULOSKELETAL: No visible joint swelling.   NEUROLOGIC: Alert and oriented X3. Normal speech, gait and affect. No focal neurologic deficits.   SKIN: No jaundice. No rashes or visible skin lesions present. No ecchymosis.       LAB RESULTS:   Office Visit on 09/21/2021   Component Date Value Ref Range Status     WBC Count 09/21/2021 4.4  4.0 - 11.0 10e3/uL Final     RBC Count 09/21/2021 4.68  3.80 - 5.20 10e6/uL Final     Hemoglobin 09/21/2021 15.3  11.7 - 15.7 g/dL Final     Hematocrit 09/21/2021 44.0  35.0 - 47.0 % Final     MCV 09/21/2021 94  78 - 100 fL Final     MCH 09/21/2021 32.7  26.5 - 33.0 pg Final     MCHC 09/21/2021 34.8  31.5 - 36.5 g/dL Final     RDW 09/21/2021 13.0  10.0 - 15.0 % Final     Platelet Count 09/21/2021 90* 150 - 450 10e3/uL Final     Sodium 09/21/2021 138  134 - 144 mmol/L Final     Potassium 09/21/2021 3.9  3.5 - 5.1 mmol/L Final     Chloride 09/21/2021 103  98 - 107 mmol/L Final     Carbon Dioxide (CO2) 09/21/2021 22  21 - 31 mmol/L Final     Anion Gap 09/21/2021 13  3 - 14 mmol/L Final     Urea  Nitrogen 09/21/2021 14  7 - 25 mg/dL Final     Creatinine 09/21/2021 0.77  0.60 - 1.20 mg/dL Final     Calcium 09/21/2021 9.8  8.6 - 10.3 mg/dL Final     Glucose 09/21/2021 91  70 - 105 mg/dL Final     Alkaline Phosphatase 09/21/2021 71  34 - 104 U/L Final     AST 09/21/2021 30  13 - 39 U/L Final     ALT 09/21/2021 30  7 - 52 U/L Final     Protein Total 09/21/2021 7.1  6.4 - 8.9 g/dL Final     Albumin 09/21/2021 4.5  3.5 - 5.7 g/dL Final     Bilirubin Total 09/21/2021 1.4* 0.3 - 1.0 mg/dL Final     GFR Estimate 09/21/2021 83  >60 mL/min/1.73m2 Final    As of July 11, 2021, eGFR is calculated by the CKD-EPI creatinine equation, without race adjustment. eGFR can be influenced by muscle mass, exercise, and diet. The reported eGFR is an estimation only and is only applicable if the renal function is stable.     Erythrocyte Sedimentation Rate 09/21/2021 7  0 - 30 mm/hr Final     CRP Inflammation 09/21/2021 1.4  <10.0 mg/L Final     Hold Specimen 09/21/2021 [facility]   Final         ASSESSMENT:   Charleen Huddleston presents for cardiology follow-up to visit on 6/16/21 with equivocal stress test and racing heart palpitations.  She has a history of hypertension, hyperlipidemia, prediabetes, SLE, ongoing tobacco abuse, elevated liver enzymes, hepatic steatosis and alcohol-related thrombocytopenia.    Today she describes feeling well, working on dietary modification and regular fast paced walking for weight loss. Palpitations resolved. No chest pain or pressure. Dyspnea improved. No edema. No ETOH use since April 2021. BP has been well controlled.     1. Tachycardia  2. Essential hypertension  3. Equivocal stress test  4. Liver fibrosis  5. Prediabetes  6. Nicotine use disorder  7. Mixed hyperlipidemia  8. Thrombocytopenia (H)  9. Systolic murmur  10. Aortic valve calcification    PLAN:   1. Racing heart palpitations resolved.  She will continue on Toprol XL 25 mg daily, has been feeling good. She does not endorse any anginal  symptoms.   2. Previously reviewed recent stress echocardiogram on 5/4/2021 which was submaximal, only 80% of the maximum predicted heart rate achieved. No evidence of stress induced myocardial ischemia or past infarct. The LVEF was 55 to 60% at rest and increased appropriately to greater than 70% after exercise with appropriate decrease in LV cavity size.  Normal heart rate response to exercise.  Hypertensive response to exercise.  No anginal symptoms were endorsed during the study.  Doppler exam with no significant valvular abnormalities.  Her resting ECG revealed nonspecific ST and T wave changes.  Tracings throughout exercise without ectopy of significance.  There was some slight ST depression at almost 1 mm in the inferior and lateral leads with peak exercise.  In the recovery phase, no ectopy and continued ST changes were seen.  No evidence of exercise-induced dysrhythmia.  Inconclusive EKG portion of the stress test for exercise-induced myocardial ischemia.  3. ECG changes <2mm ST depression, nondiagnostic. Suspected the minimal ST changes secondary to hypertensive response to exercise. Again, no anginal symptoms endorsed. No indication for further testing at this time.   4. 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 Her FibroScan was completed on 5/13/2021 revealing a F4 fibrosis. No hepatoma on recent US. She is working on weight loss and regular exercise, Mediterranean diet. She has also been abstinent of ETOH use.   5. Previously reviewed TTE  (4/16/2021) revealing normal global and regional LV function, LVEF 60 to 65%, no regional wall motion abnormalities. No hemodynamically significant valvular abnormalities.  No pulmonary hypertension.  No pericardial effusion.  6. BP currently well controlled.  7. Given cardiac murmur and known aortic valve calcification, recommended repeat TTE.  8.  Labs reviewed and have remained stable, lipids at goal.   9.  Follow-up with cardiology in  1 year, certainly sooner if needed.    Thank you for allowing me to participate in the care of your patient. Please do not hesitate to contact me if you have any questions.     SMITHA Parra CNP [facility]

## 2022-03-18 DIAGNOSIS — J45.20 MILD INTERMITTENT ASTHMA WITHOUT COMPLICATION: Primary | ICD-10-CM

## 2022-03-21 DIAGNOSIS — J45.20 MILD INTERMITTENT ASTHMA WITHOUT COMPLICATION: ICD-10-CM

## 2022-03-21 RX ORDER — ALBUTEROL SULFATE 90 UG/1
AEROSOL, METERED RESPIRATORY (INHALATION)
Qty: 18 G | Refills: 11 | Status: SHIPPED | OUTPATIENT
Start: 2022-03-21 | End: 2022-03-22

## 2022-03-21 NOTE — TELEPHONE ENCOUNTER
"Refill requests for Albuterol inhaler and Advair Diskus from [facility].  Note from pharmacy says. \"Patient is seeking a refill-she has never filled this here and RX was  from last pharmacy.\"  Last office visit 2022, last refill , doesn't pass RN Refill protocol due to:      Asthma control assessment score within normal limits in last 6 months    Medication is active on med list     Albuterol was removed from medlist on 2021, Advair removed 2019, patient said she didn't really use them before so they were taken off but now with all of the snow melting and mold in the air she is out walking and finding herself wheezing and needing one.  She said she would feel more comfortable having them on hand for when/if she needs them.  Routing to PCP to address.  Unable to complete prescription refill per RN Medication Refill Policy. Marissa Burroughs RN 3/21/2022 12:34 PM    "

## 2022-03-22 RX ORDER — ALBUTEROL SULFATE 90 UG/1
AEROSOL, METERED RESPIRATORY (INHALATION)
Qty: 18 G | Refills: 11 | Status: SHIPPED | OUTPATIENT
Start: 2022-03-22 | End: 2023-08-23

## 2022-03-22 NOTE — TELEPHONE ENCOUNTER
Disp Refills Start End MIKE   albuterol (PROAIR HFA/PROVENTIL HFA/VENTOLIN HFA) 108 (90 Base) MCG/ACT inhaler 18 g 11 3/21/2022  No   Sig: PRIOR TO SEEKING A REFILL     Sent yesterday with sig listed above. Will route to provider for review. Linda Miller RN  ....................  3/22/2022   3:17 PM

## 2022-05-11 DIAGNOSIS — M79.641 PAIN IN BOTH HANDS: Primary | ICD-10-CM

## 2022-05-11 DIAGNOSIS — M79.642 PAIN IN BOTH HANDS: Primary | ICD-10-CM

## 2022-05-12 ENCOUNTER — HOSPITAL ENCOUNTER (OUTPATIENT)
Dept: GENERAL RADIOLOGY | Facility: OTHER | Age: 62
Discharge: HOME | End: 2022-05-12
Attending: SPECIALIST
Payer: COMMERCIAL

## 2022-05-12 ENCOUNTER — OFFICE VISIT (OUTPATIENT)
Dept: ORTHOPEDICS | Facility: OTHER | Age: 62
End: 2022-05-12
Attending: SPECIALIST
Payer: COMMERCIAL

## 2022-05-12 VITALS
BODY MASS INDEX: 32.2 KG/M2 | DIASTOLIC BLOOD PRESSURE: 74 MMHG | SYSTOLIC BLOOD PRESSURE: 132 MMHG | HEART RATE: 60 BPM | WEIGHT: 175 LBS | HEIGHT: 62 IN

## 2022-05-12 DIAGNOSIS — M79.641 PAIN IN BOTH HANDS: ICD-10-CM

## 2022-05-12 DIAGNOSIS — M79.642 PAIN IN BOTH HANDS: ICD-10-CM

## 2022-05-12 DIAGNOSIS — M65.331 TRIGGER MIDDLE FINGER OF RIGHT HAND: ICD-10-CM

## 2022-05-12 DIAGNOSIS — M79.641 PAIN IN BOTH HANDS: Primary | ICD-10-CM

## 2022-05-12 DIAGNOSIS — M79.642 PAIN IN BOTH HANDS: Primary | ICD-10-CM

## 2022-05-12 PROCEDURE — 73130 X-RAY EXAM OF HAND: CPT | Mod: LT

## 2022-05-12 PROCEDURE — 20600 DRAIN/INJ JOINT/BURSA W/O US: CPT | Mod: 50 | Performed by: SPECIALIST

## 2022-05-12 PROCEDURE — 99214 OFFICE O/P EST MOD 30 MIN: CPT | Mod: 25 | Performed by: SPECIALIST

## 2022-05-12 PROCEDURE — 250N000011 HC RX IP 250 OP 636: Performed by: SPECIALIST

## 2022-05-12 PROCEDURE — 250N000009 HC RX 250: Performed by: SPECIALIST

## 2022-05-12 RX ORDER — METHYLPREDNISOLONE ACETATE 40 MG/ML
40 INJECTION, SUSPENSION INTRA-ARTICULAR; INTRALESIONAL; INTRAMUSCULAR; SOFT TISSUE ONCE
Status: COMPLETED | OUTPATIENT
Start: 2022-05-12 | End: 2022-05-12

## 2022-05-12 RX ORDER — BUPIVACAINE HYDROCHLORIDE 5 MG/ML
1 INJECTION, SOLUTION EPIDURAL; INTRACAUDAL ONCE
Status: COMPLETED | OUTPATIENT
Start: 2022-05-12 | End: 2022-05-12

## 2022-05-12 RX ADMIN — BUPIVACAINE HYDROCHLORIDE 1 MG: 5 INJECTION, SOLUTION EPIDURAL; INTRACAUDAL at 10:20

## 2022-05-12 RX ADMIN — METHYLPREDNISOLONE ACETATE 40 MG: 40 INJECTION, SUSPENSION INTRA-ARTICULAR; INTRALESIONAL; INTRAMUSCULAR; INTRASYNOVIAL; SOFT TISSUE at 10:20

## 2022-05-12 NOTE — NURSING NOTE
"Chief Complaint   Patient presents with     Consult     Bilateral Hand Pain      Patient is here today for bilateral hand pain consultation.     Jaymie Beyer LPN on 5/12/2022 at 8:13 AM       Initial LMP  (LMP Unknown)  Estimated body mass index is 33.29 kg/m  as calculated from the following:    Height as of 9/1/21: 1.575 m (5' 2\").    Weight as of 12/15/21: 82.6 kg (182 lb).  Medication Reconciliation: complete    Jaymie Beyer LPN  "

## 2022-05-12 NOTE — PROGRESS NOTES
Visit Date: 2022    HISTORY OF PRESENT ILLNESS:  Arabella Reyes is a 62-year-old right-hand dominant female who am I am seeing today for evaluation of her right long finger as well as basilar joints.  She has been noticing ongoing discomfort of both hands.  She has had triggering of her right long finger since 2021, but she is also noticing difficulty with gripping, grasping activities.  She localizes this to the basilar joint region.    PHYSICAL EXAMINATION:  A 62-year-old female, alert and oriented x3, and appropriate.  Gait and station are appropriate, well-groomed and well-kempt.  Examination of both upper extremities reveals full and symmetric range of motion of shoulders, elbows.  Examination of the right long finger shows tenderness with palpation of the A1 pulley with definite triggering.  The patient shows basilar joint prominence bilaterally with a positive grind maneuver.  Palmar abduction is mildly limited.    IMAGING:  Plain film radiographs were reviewed including AP, lateral, and oblique views of both hands and wrists, obtained in the office today.  These show basilar joint arthrosis with mild subluxation; otherwise, uniform mineralization.    IMPRESSION AND PLAN:    1.  Bilateral thumb basal joint arthrosis.  We discussed proceeding with injection.      PROCEDURE:  A sterile prep was performed and each basilar joint was injected with 40 mg of Depo-Medrol and 1 mL of 0.5% Marcaine with epinephrine.  There were no complications.    2.  Right long finger trigger digit.  We discussed this at length.  This has been going on since August.  We recommended considering A1 release and this may be scheduled at her convenience.    Flako Reno MD        D: 2022   T: 2022   MT: BONITA    Name:     ARABELLA REYES  MRN:      [id number]        Account:    [id number]   :      1960           Visit Date: 2022     Document: [id number]

## 2022-05-12 NOTE — PROGRESS NOTES
Patient is here today for bilateral hand pain consult.     Jaymie Beyer LPN on 5/12/2022 at 8:14 AM

## 2022-05-16 ENCOUNTER — HOSPITAL ENCOUNTER (OUTPATIENT)
Dept: ULTRASOUND IMAGING | Facility: OTHER | Age: 62
Discharge: HOME | End: 2022-05-16
Attending: PHYSICIAN ASSISTANT
Payer: COMMERCIAL

## 2022-05-16 ENCOUNTER — LAB (OUTPATIENT)
Dept: LAB | Facility: OTHER | Age: 62
End: 2022-05-16
Attending: PHYSICIAN ASSISTANT
Payer: COMMERCIAL

## 2022-05-16 DIAGNOSIS — K75.81 LIVER CIRRHOSIS SECONDARY TO NASH (NONALCOHOLIC STEATOHEPATITIS) (H): ICD-10-CM

## 2022-05-16 DIAGNOSIS — Z23 NEED FOR VACCINATION FOR VIRAL HEPATITIS: ICD-10-CM

## 2022-05-16 DIAGNOSIS — K74.60 LIVER CIRRHOSIS SECONDARY TO NASH (NONALCOHOLIC STEATOHEPATITIS) (H): ICD-10-CM

## 2022-05-16 DIAGNOSIS — K74.60 LIVER CIRRHOSIS SECONDARY TO NASH (H): ICD-10-CM

## 2022-05-16 DIAGNOSIS — K75.81 LIVER CIRRHOSIS SECONDARY TO NASH (H): ICD-10-CM

## 2022-05-16 LAB
ALBUMIN SERPL-MCNC: 4.8 G/DL (ref 3.5–5.7)
ALP SERPL-CCNC: 82 U/L (ref 34–104)
ALT SERPL W P-5'-P-CCNC: 23 U/L (ref 7–52)
ANION GAP SERPL CALCULATED.3IONS-SCNC: 8 MMOL/L (ref 3–14)
AST SERPL W P-5'-P-CCNC: 25 U/L (ref 13–39)
BASOPHILS # BLD AUTO: 0 10E3/UL (ref 0–0.2)
BASOPHILS NFR BLD AUTO: 1 %
BILIRUB DIRECT SERPL-MCNC: 0.2 MG/DL (ref 0–0.2)
BILIRUB SERPL-MCNC: 1.1 MG/DL (ref 0.3–1)
BUN SERPL-MCNC: 19 MG/DL (ref 7–25)
CALCIUM SERPL-MCNC: 9.9 MG/DL (ref 8.6–10.3)
CHLORIDE BLD-SCNC: 103 MMOL/L (ref 98–107)
CO2 SERPL-SCNC: 27 MMOL/L (ref 21–31)
CREAT SERPL-MCNC: 0.77 MG/DL (ref 0.6–1.2)
EOSINOPHIL # BLD AUTO: 0.1 10E3/UL (ref 0–0.7)
EOSINOPHIL NFR BLD AUTO: 2 %
ERYTHROCYTE [DISTWIDTH] IN BLOOD BY AUTOMATED COUNT: 12.9 % (ref 10–15)
GFR SERPL CREATININE-BSD FRML MDRD: 86 ML/MIN/1.73M2
GLUCOSE BLD-MCNC: 109 MG/DL (ref 70–105)
HCT VFR BLD AUTO: 47.1 % (ref 35–47)
HGB BLD-MCNC: 16.1 G/DL (ref 11.7–15.7)
IMM GRANULOCYTES # BLD: 0 10E3/UL
IMM GRANULOCYTES NFR BLD: 0 %
INR PPP: 1.04 (ref 0.85–1.15)
LYMPHOCYTES # BLD AUTO: 1.5 10E3/UL (ref 0.8–5.3)
LYMPHOCYTES NFR BLD AUTO: 39 %
MCH RBC QN AUTO: 32.5 PG (ref 26.5–33)
MCHC RBC AUTO-ENTMCNC: 34.2 G/DL (ref 31.5–36.5)
MCV RBC AUTO: 95 FL (ref 78–100)
MONOCYTES # BLD AUTO: 0.3 10E3/UL (ref 0–1.3)
MONOCYTES NFR BLD AUTO: 9 %
NEUTROPHILS # BLD AUTO: 2 10E3/UL (ref 1.6–8.3)
NEUTROPHILS NFR BLD AUTO: 49 %
NRBC # BLD AUTO: 0 10E3/UL
NRBC BLD AUTO-RTO: 0 /100
PLATELET # BLD AUTO: 97 10E3/UL (ref 150–450)
POTASSIUM BLD-SCNC: 3.8 MMOL/L (ref 3.5–5.1)
PROT SERPL-MCNC: 8.2 G/DL (ref 6.4–8.9)
RBC # BLD AUTO: 4.96 10E6/UL (ref 3.8–5.2)
SODIUM SERPL-SCNC: 138 MMOL/L (ref 134–144)
WBC # BLD AUTO: 4 10E3/UL (ref 4–11)

## 2022-05-16 PROCEDURE — 82105 ALPHA-FETOPROTEIN SERUM: CPT | Mod: ZL

## 2022-05-16 PROCEDURE — 85610 PROTHROMBIN TIME: CPT | Mod: ZL

## 2022-05-16 PROCEDURE — 82248 BILIRUBIN DIRECT: CPT | Mod: ZL

## 2022-05-16 PROCEDURE — 85025 COMPLETE CBC W/AUTO DIFF WBC: CPT | Mod: ZL

## 2022-05-16 PROCEDURE — 36415 COLL VENOUS BLD VENIPUNCTURE: CPT | Mod: ZL

## 2022-05-16 PROCEDURE — 76705 ECHO EXAM OF ABDOMEN: CPT

## 2022-05-16 PROCEDURE — 80053 COMPREHEN METABOLIC PANEL: CPT | Mod: ZL

## 2022-05-16 NOTE — PATIENT INSTRUCTIONS
Preparing for Your Surgery  Getting started  A nurse will call you to review your health history and instructions. They will give you an arrival time based on your scheduled surgery time. Please be ready to share:    Your doctor's clinic name and phone number    Your medical, surgical and anesthesia history    A list of allergies and sensitivities    A list of medicines, including herbal treatments and over-the-counter drugs    Whether the patient has a legal guardian (ask how to send us the papers in advance)  Please tell us if you're pregnant--or if there's any chance you might be pregnant. Some surgeries may injure a fetus (unborn baby), so they require a pregnancy test. Surgeries that are safe for a fetus don't always need a test, and you can choose whether to have one.   If you have a child who's having surgery, please ask for a copy of Preparing for Your Child's Surgery.    Preparing for surgery    Within 30 days of surgery: Have a pre-op exam (sometimes called an H&P, or History and Physical). This can be done at a clinic or pre-operative center.  ? If you're having a , you may not need this exam. Talk to your care team.    At your pre-op exam, talk to your care team about all medicines you take. If you need to stop any medicines before surgery, ask when to start taking them again.  ? We do this for your safety. Many medicines can make you bleed too much during surgery. Some change how well surgery (anesthesia) drugs work.    Call your insurance company to let them know you're having surgery. (If you don't have insurance, call [phone].)    Call your clinic if there's any change in your health. This includes signs of a cold or flu (sore throat, runny nose, cough, rash, fever). It also includes a scrape or scratch near the surgery site.    If you have questions on the day of surgery, call your hospital or surgery center.  COVID testing  You may need to be tested for COVID-19 before having  surgery. If so, we will give you instructions.  Eating and drinking guidelines  For your safety: Unless your surgeon tells you otherwise, follow the guidelines below.    Eat and drink as usual until 8 hours before surgery. After that, no food or milk.    Drink clear liquids until 2 hours before surgery. These are liquids you can see through, like water, Gatorade and Propel Water. You may also have black coffee and tea (no cream or milk).    Nothing by mouth within 2 hours of surgery. This includes gum, candy and breath mints.    If you drink alcohol: Stop drinking it the night before surgery.    If your care team tells you to take medicine on the morning of surgery, it's okay to take it with a sip of water.  Preventing infection    Shower or bathe the night before and morning of your surgery. Follow the instructions your clinic gave you. (If no instructions, use regular soap.)    Don't shave or clip hair near your surgery site. We'll remove the hair if needed.    Don't smoke or vape the morning of surgery. You may chew nicotine gum up to 2 hours before surgery. A nicotine patch is okay.  ? Note: Some surgeries require you to completely quit smoking and nicotine. Check with your surgeon.    Your care team will make every effort to keep you safe from infection. We will:  ? Clean our hands often with soap and water (or an alcohol-based hand rub).  ? Clean the skin at your surgery site with a special soap that kills germs.  ? Give you a special gown to keep you warm. (Cold raises the risk of infection.)  ? Wear special hair covers, masks, gowns and gloves during surgery.  ? Give antibiotic medicine, if prescribed. Not all surgeries need antibiotics.  What to bring on the day of surgery    Photo ID and insurance card    Copy of your health care directive, if you have one    Glasses and hearing aides (bring cases)  ? You can't wear contacts during surgery    Inhaler and eye drops, if you use them (tell us about these when  you arrive)    CPAP machine or breathing device, if you use them    A few personal items, if spending the night    If you have . . .  ? A pacemaker, ICD (cardiac defibrillator) or other implant: Bring the ID card.  ? An implanted stimulator: Bring the remote control.  ? A legal guardian: Bring a copy of the certified (court-stamped) guardianship papers.  Please remove any jewelry, including body piercings. Leave jewelry and other valuables at home.  If you're going home the day of surgery    You must have a responsible adult drive you home. They should stay with you overnight as well.    If you don't have someone to stay with you, and you aren't safe to go home alone, we may keep you overnight. Insurance often won't pay for this.  After surgery  If it's hard to control your pain or you need more pain medicine, please call your surgeon's office.  Questions?   If you have any questions for your care team, list them here: _________________________________________________________________________________________________________________________________________________________________________ ____________________________________ ____________________________________ ____________________________________  For informational purposes only. Not to replace the advice of your health care provider. Copyright   2003, 2019 [facility]. All rights reserved. Clinically reviewed by Bibiana Oliver MD. VANDOLAY [id number] - REV 07/21.

## 2022-05-16 NOTE — H&P (VIEW-ONLY)
[facility]  [address]  [place] [address]  Phone: [phone]  Fax: [phone]  Primary Provider: Elisa Doherty  Pre-op Performing Provider: TACOS CRAMER      PREOPERATIVE EVALUATION:  Today's date: 5/17/2022    Charleen Huddleston is a 62 year old female who presents for a preoperative evaluation.    Surgical Information:  Surgery/Procedure: Right Long (Middle) Trigger Finger Release  Surgery Location: [facility]  Surgeon: Shadia  Surgery Date: 5/27/2022  Time of Surgery: TBD  Where patient plans to recover: At home with family  Fax number for surgical facility: Note does not need to be faxed, will be available electronically in Epic.    Type of Anesthesia Anticipated: to be determined    Assessment & Plan     The proposed surgical procedure is considered INTERMEDIATE risk.    1. Preop general physical exam    2. Trigger finger, acquired    3. Mild intermittent asthma without complication    4. Mixed hyperlipidemia    5. Prediabetes    6. Primary hypertension    7. Lupus (H)      Preoperative COVID testing pending. Labs completed yesterday and were stable. EKG with NRS, nonspecific ST abnormality, patient without chest pain or pressure, palpitations, dizziness, lightheadedness, syncope, headaches, vision changes, or shortness of breath. Has follow up scheduled with cardiology on 5/23/2022.       Risks and Recommendations:  The patient has the following additional risks and recommendations for perioperative complications:   - No identified additional risk factors other than previously addressed    Medication Instructions:  Patient is to take all scheduled medications on the day of surgery    RECOMMENDATION:  APPROVAL GIVEN to proceed with proposed procedure, without further diagnostic evaluation.    Tacos Cramer PA-C on 5/16/2022 at 8:09 AM    Subjective     HPI related to upcoming procedure: History of right middle finger discomfort. Met with orthopedics  05/12/2022 who diagnosed her with trigger finger. Recommended surgical management at this time.       Preop Questions 5/17/2022   1. Have you ever had a heart attack or stroke? No   2. Have you ever had surgery on your heart or blood vessels, such as a stent placement, a coronary artery bypass, or surgery on an artery in your head, neck, heart, or legs? No   3. Do you have chest pain with activity? No   4. Do you have a history of  heart failure? No   5. Do you currently have a cold, bronchitis or symptoms of other infection? No   6. Do you have a cough, shortness of breath, or wheezing? No   7. Do you or anyone in your family have previous history of blood clots? No   8. Do you or does anyone in your family have a serious bleeding problem such as prolonged bleeding following surgeries or cuts? No   9. Have you ever had problems with anemia or been told to take iron pills? No   10. Have you had any abnormal blood loss such as black, tarry or bloody stools, or abnormal vaginal bleeding? No   11. Have you ever had a blood transfusion? No   12. Are you willing to have a blood transfusion if it is medically needed before, during, or after your surgery? Yes   13. Have you or any of your relatives ever had problems with anesthesia? No   14. Do you have sleep apnea, excessive snoring or daytime drowsiness? No   15. Do you have any artifical heart valves or other implanted medical devices like a pacemaker, defibrillator, or continuous glucose monitor? No   16. Do you have artificial joints? YES - right knee   17. Are you allergic to latex? No     Health Care Directive:  Patient has a Health Care Directive on file      Preoperative Review of :   reviewed - no record of controlled substances prescribed.      Status of Chronic Conditions:  See problem list for active medical problems.  Problems all longstanding and stable, except as noted/documented.  See ROS for pertinent symptoms related to these  conditions.      Review of Systems  CONSTITUTIONAL: NEGATIVE for fever, chills, change in weight  INTEGUMENTARY/SKIN: NEGATIVE for worrisome rashes, moles or lesions  EYES: NEGATIVE for vision changes or irritation  ENT/MOUTH: NEGATIVE for ear, mouth and throat problems  RESP: NEGATIVE for significant cough or SOB  CV: NEGATIVE for chest pain, palpitations or peripheral edema  GI: NEGATIVE for nausea, abdominal pain, heartburn, or change in bowel habits  : NEGATIVE for frequency, dysuria, or hematuria  MUSCULOSKELETAL:finger discomfort  NEURO: NEGATIVE for weakness, dizziness or paresthesias  ENDOCRINE: NEGATIVE for temperature intolerance, skin/hair changes  HEME: NEGATIVE for bleeding problems  PSYCHIATRIC: NEGATIVE for changes in mood or affect    Patient Active Problem List    Diagnosis Date Noted     Allodynia 09/21/2021     Priority: Medium     Other complicated headache syndrome 09/21/2021     Priority: Medium     NAFLD (nonalcoholic fatty liver disease) 09/18/2019     Priority: Medium     Prediabetes 09/18/2019     Priority: Medium     Gastroesophageal reflux disease without esophagitis 09/18/2019     Priority: Medium     Mild intermittent asthma without complication 09/18/2019     Priority: Medium     Mixed hyperlipidemia 08/24/2018     Priority: Medium     Hyperglycemia 08/24/2018     Priority: Medium     Pulmonary nodules 08/24/2018     Priority: Medium     Obesity 01/24/2018     Priority: Medium     Nicotine use disorder 08/30/2016     Priority: Medium     Lupus (H) 05/06/2016     Priority: Medium     [place] encephalitis 05/06/2016     Priority: Medium     Formatting of this note might be different from the original.  SLE, inactive  Formatting of this note might be different from the original.  Formatting of this note might be different from the original.  SLE, inactive       Elevated LFTs 09/02/2013     Priority: Medium     HTN (hypertension) 05/18/2013     Priority: Medium     Disorder of bursae  and tendons in shoulder region 08/12/2011     Priority: Medium     Other affections of shoulder region, not elsewhere classified 02/02/2010     Priority: Medium      Past Medical History:   Diagnosis Date     Bacterial pneumonia 02/02/2010     Bitten by dog 09/2012    hospitalized     Chronic sinusitis     Chronic Sinusitis     Nonrheumatic mitral valve insufficiency 02/2005    Trace Mitral Regurgitation, Echo.  Does not require SBE     Other shoulder lesions, left shoulder     L shoulder RTC tendonitis     Personal history of other medical treatment (CODE)     prophylaxis     Personal history of other medical treatment (CODE) 03/11/2005    Echocardiogram 3/11/05 for family history of mitral valve and aortic valve replacement.  Trace mitral regurgitation.  Mitral valve appeared normal.  Tricuspid aortic valve without AI or AS.  No focal wall motion abnormalities.  Ejection fraction 72% 3/12/05.     Primary osteoarthritis of right knee     No Comments Provided     Tobacco use     quit 9/2012     Past Surgical History:   Procedure Laterality Date     ARTHROSCOPY KNEE Right 08/2001    Dr. 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     ARTHROSCOPY SHOULDER  2012    2011 / 2012,Left, supraspinatus repair     COLONOSCOPY  01/10/2013    hyperplastic, 1/10/23     ECHOCARDIOGRAM INTRAOPERATIVE IN OR  03/11/2005    for family history of mitral valve and aortic valve replacement.  Trace mitral regurgitation.  Mitral valve appeared normal.  Tricuspid aortic valve without AI or AS.  No focal wall motion abnormalities.  Ejection fraction 72%     OTHER SURGICAL HISTORY  03/13/2006    PREMALIG/BENIGN SKIN LESION EXCISION,Excision of a benign verrucose keratosis with actinic damage and lentiginous change.     OTHER SURGICAL HISTORY  12/2006    AK SODIUM HYALONURATE PER INJECTION,Synvisc injection     TONSILLECTOMY      No Comments Provided     TOTAL KNEE ARTHROPLASTY Right      Current Outpatient  Medications   Medication Sig Dispense Refill     ADVAIR DISKUS 250-50 MCG/DOSE inhaler TWICE DAILY 60 each 11     albuterol (PROAIR HFA/PROVENTIL HFA/VENTOLIN HFA) 108 (90 Base) MCG/ACT inhaler INHALE 2 PUFFS BY MOUTH DAILY AS NEEDED 18 g 11     atorvastatin (LIPITOR) 20 MG tablet Take 1 tablet (20 mg) by mouth At Bedtime 90 tablet 3     losartan-hydrochlorothiazide (HYZAAR) 50-12.5 MG tablet Take 1 tablet by mouth daily 90 tablet 3     metoprolol succinate ER (TOPROL-XL) 25 MG 24 hr tablet Take 1 tablet (25 mg) by mouth daily 90 tablet 3     omeprazole (PRILOSEC) 20 MG DR capsule TAKE 1 CAPSULE BY MOUTH EVERY DAY BEFORE A MEAL 90 capsule 2       Allergies   Allergen Reactions     Lisinopril      cough        Social History     Tobacco Use     Smoking status: Current Every Day Smoker     Packs/day: 0.75     Years: 45.00     Pack years: 33.75     Types: Cigarettes     Last attempt to quit: 10/1/2012     Years since quittin.6     Smokeless tobacco: Never Used   Substance Use Topics     Alcohol use: Not Currently     Alcohol/week: 3.0 standard drinks     Family History   Problem Relation Age of Onset     Hyperlipidemia Mother         Hyperlipidemia,Hyperlipidemia     Other - See Comments Mother         Stroke,CVA      Heart Disease Mother         Heart Disease,aortic and mitral valve replacements, rheumatic fever     Mitral Valve Replacement Mother      Diabetes Father         Diabetes     Mitral Valve Replacement Father      Rheumatic fever Father      Diabetes Sister      Polycystic ovary syndrome Child      Thyroid Disease Child      Heart Disease Sister         Heart Disease,Valvular heart disease and diffuse athersclerosis on brain MRI     Other - See Comments Sister         rheumatic fever     Rheumatic fever Sister      Cerebrovascular Disease Sister      Other - See Comments Maternal Grandmother         Stroke,CVA     Prostate Cancer Paternal Grandfather         Cancer-prostate     Hypertension Sister   "    Hyperlipidemia Sister      History   Drug Use No         Objective     /72   Pulse 84   Temp 97.7  F (36.5  C)   Resp 14   Ht 1.575 m (5' 2\")   Wt 80.2 kg (176 lb 12.8 oz)   LMP  (LMP Unknown)   SpO2 96%   Breastfeeding No   BMI 32.34 kg/m      Physical Exam    GENERAL APPEARANCE: healthy, alert and no distress     EYES: EOMI, PERRL     HENT: ear canals and TM's normal and nose and mouth without ulcers or lesions     NECK: no adenopathy, no asymmetry, masses, or scars and thyroid normal to palpation     RESP: lungs clear to auscultation - no rales, rhonchi or wheezes     CV: regular rates and rhythm, normal S1 S2, no S3 or S4 and no murmur, click or rub     SKIN: no suspicious lesions or rashes     PSYCH: mentation appears normal. and affect normal/bright     LYMPHATICS: No cervical adenopathy    Recent Labs   Lab Test 05/16/22  0657 09/21/21  1435 03/31/21  0857   HGB 16.1* 15.3 15.5   PLT 97* 90* 112*   INR 1.04  --   --     138  --    POTASSIUM 3.8 3.9  --    CR 0.77 0.77  --    A1C  --   --  5.3        Diagnostics:  No labs were ordered during this visit. Completed yesterday and were stable.  EKG: appears normal, NSR, ST wave nonspecific abnormality, no LVH by voltage criteria, unchanged from previous tracings    Revised Cardiac Risk Index (RCRI):  The patient has the following serious cardiovascular risks for perioperative complications:   - No serious cardiac risks = 0 points     RCRI Interpretation: 0 points: Class I (very low risk - 0.4% complication rate)           Signed Electronically by: Briana Montoya PA-C  Copy of this evaluation report is provided to requesting physician.      "

## 2022-05-16 NOTE — PROGRESS NOTES
[facility]  [address]  [place] [address]  Phone: [phone]  Fax: [phone]  Primary Provider: Elisa Doherty  Pre-op Performing Provider: TACOS CRAMER      PREOPERATIVE EVALUATION:  Today's date: 5/17/2022    Charleen Huddleston is a 62 year old female who presents for a preoperative evaluation.    Surgical Information:  Surgery/Procedure: Right Long (Middle) Trigger Finger Release  Surgery Location: [facility]  Surgeon: Shadia  Surgery Date: 5/27/2022  Time of Surgery: TBD  Where patient plans to recover: At home with family  Fax number for surgical facility: Note does not need to be faxed, will be available electronically in Epic.    Type of Anesthesia Anticipated: to be determined    Assessment & Plan     The proposed surgical procedure is considered INTERMEDIATE risk.    1. Preop general physical exam    2. Trigger finger, acquired    3. Mild intermittent asthma without complication    4. Mixed hyperlipidemia    5. Prediabetes    6. Primary hypertension    7. Lupus (H)      Preoperative COVID testing pending. Labs completed yesterday and were stable. EKG with NRS, nonspecific ST abnormality, patient without chest pain or pressure, palpitations, dizziness, lightheadedness, syncope, headaches, vision changes, or shortness of breath. Has follow up scheduled with cardiology on 5/23/2022.       Risks and Recommendations:  The patient has the following additional risks and recommendations for perioperative complications:   - No identified additional risk factors other than previously addressed    Medication Instructions:  Patient is to take all scheduled medications on the day of surgery    RECOMMENDATION:  APPROVAL GIVEN to proceed with proposed procedure, without further diagnostic evaluation.    Tacos Cramer PA-C on 5/16/2022 at 8:09 AM    Subjective     HPI related to upcoming procedure: History of right middle finger discomfort. Met with orthopedics  05/12/2022 who diagnosed her with trigger finger. Recommended surgical management at this time.       Preop Questions 5/17/2022   1. Have you ever had a heart attack or stroke? No   2. Have you ever had surgery on your heart or blood vessels, such as a stent placement, a coronary artery bypass, or surgery on an artery in your head, neck, heart, or legs? No   3. Do you have chest pain with activity? No   4. Do you have a history of  heart failure? No   5. Do you currently have a cold, bronchitis or symptoms of other infection? No   6. Do you have a cough, shortness of breath, or wheezing? No   7. Do you or anyone in your family have previous history of blood clots? No   8. Do you or does anyone in your family have a serious bleeding problem such as prolonged bleeding following surgeries or cuts? No   9. Have you ever had problems with anemia or been told to take iron pills? No   10. Have you had any abnormal blood loss such as black, tarry or bloody stools, or abnormal vaginal bleeding? No   11. Have you ever had a blood transfusion? No   12. Are you willing to have a blood transfusion if it is medically needed before, during, or after your surgery? Yes   13. Have you or any of your relatives ever had problems with anesthesia? No   14. Do you have sleep apnea, excessive snoring or daytime drowsiness? No   15. Do you have any artifical heart valves or other implanted medical devices like a pacemaker, defibrillator, or continuous glucose monitor? No   16. Do you have artificial joints? YES - right knee   17. Are you allergic to latex? No     Health Care Directive:  Patient has a Health Care Directive on file      Preoperative Review of :   reviewed - no record of controlled substances prescribed.      Status of Chronic Conditions:  See problem list for active medical problems.  Problems all longstanding and stable, except as noted/documented.  See ROS for pertinent symptoms related to these  conditions.      Review of Systems  CONSTITUTIONAL: NEGATIVE for fever, chills, change in weight  INTEGUMENTARY/SKIN: NEGATIVE for worrisome rashes, moles or lesions  EYES: NEGATIVE for vision changes or irritation  ENT/MOUTH: NEGATIVE for ear, mouth and throat problems  RESP: NEGATIVE for significant cough or SOB  CV: NEGATIVE for chest pain, palpitations or peripheral edema  GI: NEGATIVE for nausea, abdominal pain, heartburn, or change in bowel habits  : NEGATIVE for frequency, dysuria, or hematuria  MUSCULOSKELETAL:finger discomfort  NEURO: NEGATIVE for weakness, dizziness or paresthesias  ENDOCRINE: NEGATIVE for temperature intolerance, skin/hair changes  HEME: NEGATIVE for bleeding problems  PSYCHIATRIC: NEGATIVE for changes in mood or affect    Patient Active Problem List    Diagnosis Date Noted     Allodynia 09/21/2021     Priority: Medium     Other complicated headache syndrome 09/21/2021     Priority: Medium     NAFLD (nonalcoholic fatty liver disease) 09/18/2019     Priority: Medium     Prediabetes 09/18/2019     Priority: Medium     Gastroesophageal reflux disease without esophagitis 09/18/2019     Priority: Medium     Mild intermittent asthma without complication 09/18/2019     Priority: Medium     Mixed hyperlipidemia 08/24/2018     Priority: Medium     Hyperglycemia 08/24/2018     Priority: Medium     Pulmonary nodules 08/24/2018     Priority: Medium     Obesity 01/24/2018     Priority: Medium     Nicotine use disorder 08/30/2016     Priority: Medium     Lupus (H) 05/06/2016     Priority: Medium     [place] encephalitis 05/06/2016     Priority: Medium     Formatting of this note might be different from the original.  SLE, inactive  Formatting of this note might be different from the original.  Formatting of this note might be different from the original.  SLE, inactive       Elevated LFTs 09/02/2013     Priority: Medium     HTN (hypertension) 05/18/2013     Priority: Medium     Disorder of bursae  and tendons in shoulder region 08/12/2011     Priority: Medium     Other affections of shoulder region, not elsewhere classified 02/02/2010     Priority: Medium      Past Medical History:   Diagnosis Date     Bacterial pneumonia 02/02/2010     Bitten by dog 09/2012    hospitalized     Chronic sinusitis     Chronic Sinusitis     Nonrheumatic mitral valve insufficiency 02/2005    Trace Mitral Regurgitation, Echo.  Does not require SBE     Other shoulder lesions, left shoulder     L shoulder RTC tendonitis     Personal history of other medical treatment (CODE)     prophylaxis     Personal history of other medical treatment (CODE) 03/11/2005    Echocardiogram 3/11/05 for family history of mitral valve and aortic valve replacement.  Trace mitral regurgitation.  Mitral valve appeared normal.  Tricuspid aortic valve without AI or AS.  No focal wall motion abnormalities.  Ejection fraction 72% 3/12/05.     Primary osteoarthritis of right knee     No Comments Provided     Tobacco use     quit 9/2012     Past Surgical History:   Procedure Laterality Date     ARTHROSCOPY KNEE Right 08/2001    Dr. 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     ARTHROSCOPY SHOULDER  2012    2011 / 2012,Left, supraspinatus repair     COLONOSCOPY  01/10/2013    hyperplastic, 1/10/23     ECHOCARDIOGRAM INTRAOPERATIVE IN OR  03/11/2005    for family history of mitral valve and aortic valve replacement.  Trace mitral regurgitation.  Mitral valve appeared normal.  Tricuspid aortic valve without AI or AS.  No focal wall motion abnormalities.  Ejection fraction 72%     OTHER SURGICAL HISTORY  03/13/2006    PREMALIG/BENIGN SKIN LESION EXCISION,Excision of a benign verrucose keratosis with actinic damage and lentiginous change.     OTHER SURGICAL HISTORY  12/2006    DC SODIUM HYALONURATE PER INJECTION,Synvisc injection     TONSILLECTOMY      No Comments Provided     TOTAL KNEE ARTHROPLASTY Right      Current Outpatient  Medications   Medication Sig Dispense Refill     ADVAIR DISKUS 250-50 MCG/DOSE inhaler TWICE DAILY 60 each 11     albuterol (PROAIR HFA/PROVENTIL HFA/VENTOLIN HFA) 108 (90 Base) MCG/ACT inhaler INHALE 2 PUFFS BY MOUTH DAILY AS NEEDED 18 g 11     atorvastatin (LIPITOR) 20 MG tablet Take 1 tablet (20 mg) by mouth At Bedtime 90 tablet 3     losartan-hydrochlorothiazide (HYZAAR) 50-12.5 MG tablet Take 1 tablet by mouth daily 90 tablet 3     metoprolol succinate ER (TOPROL-XL) 25 MG 24 hr tablet Take 1 tablet (25 mg) by mouth daily 90 tablet 3     omeprazole (PRILOSEC) 20 MG DR capsule TAKE 1 CAPSULE BY MOUTH EVERY DAY BEFORE A MEAL 90 capsule 2       Allergies   Allergen Reactions     Lisinopril      cough        Social History     Tobacco Use     Smoking status: Current Every Day Smoker     Packs/day: 0.75     Years: 45.00     Pack years: 33.75     Types: Cigarettes     Last attempt to quit: 10/1/2012     Years since quittin.6     Smokeless tobacco: Never Used   Substance Use Topics     Alcohol use: Not Currently     Alcohol/week: 3.0 standard drinks     Family History   Problem Relation Age of Onset     Hyperlipidemia Mother         Hyperlipidemia,Hyperlipidemia     Other - See Comments Mother         Stroke,CVA      Heart Disease Mother         Heart Disease,aortic and mitral valve replacements, rheumatic fever     Mitral Valve Replacement Mother      Diabetes Father         Diabetes     Mitral Valve Replacement Father      Rheumatic fever Father      Diabetes Sister      Polycystic ovary syndrome Child      Thyroid Disease Child      Heart Disease Sister         Heart Disease,Valvular heart disease and diffuse athersclerosis on brain MRI     Other - See Comments Sister         rheumatic fever     Rheumatic fever Sister      Cerebrovascular Disease Sister      Other - See Comments Maternal Grandmother         Stroke,CVA     Prostate Cancer Paternal Grandfather         Cancer-prostate     Hypertension Sister   "    Hyperlipidemia Sister      History   Drug Use No         Objective     /72   Pulse 84   Temp 97.7  F (36.5  C)   Resp 14   Ht 1.575 m (5' 2\")   Wt 80.2 kg (176 lb 12.8 oz)   LMP  (LMP Unknown)   SpO2 96%   Breastfeeding No   BMI 32.34 kg/m      Physical Exam    GENERAL APPEARANCE: healthy, alert and no distress     EYES: EOMI, PERRL     HENT: ear canals and TM's normal and nose and mouth without ulcers or lesions     NECK: no adenopathy, no asymmetry, masses, or scars and thyroid normal to palpation     RESP: lungs clear to auscultation - no rales, rhonchi or wheezes     CV: regular rates and rhythm, normal S1 S2, no S3 or S4 and no murmur, click or rub     SKIN: no suspicious lesions or rashes     PSYCH: mentation appears normal. and affect normal/bright     LYMPHATICS: No cervical adenopathy    Recent Labs   Lab Test 05/16/22  0657 09/21/21  1435 03/31/21  0857   HGB 16.1* 15.3 15.5   PLT 97* 90* 112*   INR 1.04  --   --     138  --    POTASSIUM 3.8 3.9  --    CR 0.77 0.77  --    A1C  --   --  5.3        Diagnostics:  No labs were ordered during this visit. Completed yesterday and were stable.  EKG: appears normal, NSR, ST wave nonspecific abnormality, no LVH by voltage criteria, unchanged from previous tracings    Revised Cardiac Risk Index (RCRI):  The patient has the following serious cardiovascular risks for perioperative complications:   - No serious cardiac risks = 0 points     RCRI Interpretation: 0 points: Class I (very low risk - 0.4% complication rate)           Signed Electronically by: Briana Montoya PA-C  Copy of this evaluation report is provided to requesting physician.      "

## 2022-05-17 ENCOUNTER — OFFICE VISIT (OUTPATIENT)
Dept: FAMILY MEDICINE | Facility: OTHER | Age: 62
End: 2022-05-17
Attending: PHYSICIAN ASSISTANT
Payer: COMMERCIAL

## 2022-05-17 VITALS
HEIGHT: 62 IN | SYSTOLIC BLOOD PRESSURE: 134 MMHG | RESPIRATION RATE: 14 BRPM | TEMPERATURE: 97.7 F | OXYGEN SATURATION: 96 % | WEIGHT: 176.8 LBS | BODY MASS INDEX: 32.54 KG/M2 | DIASTOLIC BLOOD PRESSURE: 72 MMHG | HEART RATE: 84 BPM

## 2022-05-17 DIAGNOSIS — I10 PRIMARY HYPERTENSION: ICD-10-CM

## 2022-05-17 DIAGNOSIS — J45.20 MILD INTERMITTENT ASTHMA WITHOUT COMPLICATION: ICD-10-CM

## 2022-05-17 DIAGNOSIS — M65.30 TRIGGER FINGER, ACQUIRED: ICD-10-CM

## 2022-05-17 DIAGNOSIS — R73.03 PREDIABETES: ICD-10-CM

## 2022-05-17 DIAGNOSIS — E78.2 MIXED HYPERLIPIDEMIA: ICD-10-CM

## 2022-05-17 DIAGNOSIS — Z01.818 PREOP GENERAL PHYSICAL EXAM: Primary | ICD-10-CM

## 2022-05-17 LAB
ATRIAL RATE - MUSE: 74 BPM
DIASTOLIC BLOOD PRESSURE - MUSE: NORMAL MMHG
INTERPRETATION ECG - MUSE: NORMAL
P AXIS - MUSE: 51 DEGREES
PR INTERVAL - MUSE: 132 MS
QRS DURATION - MUSE: 92 MS
QT - MUSE: 376 MS
QTC - MUSE: 417 MS
R AXIS - MUSE: 53 DEGREES
SYSTOLIC BLOOD PRESSURE - MUSE: NORMAL MMHG
T AXIS - MUSE: 14 DEGREES
VENTRICULAR RATE- MUSE: 74 BPM

## 2022-05-17 PROCEDURE — 93000 ELECTROCARDIOGRAM COMPLETE: CPT | Performed by: INTERNAL MEDICINE

## 2022-05-17 PROCEDURE — 99214 OFFICE O/P EST MOD 30 MIN: CPT | Performed by: PHYSICIAN ASSISTANT

## 2022-05-17 ASSESSMENT — PAIN SCALES - GENERAL: PAINLEVEL: NO PAIN (0)

## 2022-05-17 NOTE — NURSING NOTE
Patient presents to clinic for Pre-Op Exam.  Suyapa Pisano LPN ....................  5/17/2022   8:29 AM

## 2022-05-17 NOTE — PROGRESS NOTES
[facility]  [address]  [place] [address]  Phone: [phone]  Fax: [phone]  Primary Provider: Elisa Doherty  {FV AMB Performing Provider (Optional):088231}    {Provider  Link to PREOP SmartSet  Use this to apply standard patient instructions to AVS; includes medication directions, common orders, guidelines for anemia, warfarin, additional testing   :804452}  PREOPERATIVE EVALUATION:  Today's date: 5/17/2022    Charleen Huddleston is a 62 year old female who presents for a preoperative evaluation.    Surgical Information:  Surgery/Procedure: ***  Surgery Location: ***  Surgeon: ***  Surgery Date: ***  Time of Surgery: ***  Where patient plans to recover: {Preop post recovery plans :149360}  Fax number for surgical facility: {SURGERY FAX NUMBER:[id number]}    Type of Anesthesia Anticipated: {ANESTHESIA:940282}    {2021 Provider Charting Preference for Preop :083107}    Subjective     HPI related to upcoming procedure: ***    Preop Questions 5/17/2022   1. Have you ever had a heart attack or stroke? No   2. Have you ever had surgery on your heart or blood vessels, such as a stent placement, a coronary artery bypass, or surgery on an artery in your head, neck, heart, or legs? No   3. Do you have chest pain with activity? No   4. Do you have a history of  heart failure? No   5. Do you currently have a cold, bronchitis or symptoms of other infection? No   6. Do you have a cough, shortness of breath, or wheezing? No   7. Do you or anyone in your family have previous history of blood clots? No   8. Do you or does anyone in your family have a serious bleeding problem such as prolonged bleeding following surgeries or cuts? No   9. Have you ever had problems with anemia or been told to take iron pills? No   10. Have you had any abnormal blood loss such as black, tarry or bloody stools, or abnormal vaginal bleeding? No   11. Have you ever had a blood transfusion? No   12. Are  you willing to have a blood transfusion if it is medically needed before, during, or after your surgery? Yes   13. Have you or any of your relatives ever had problems with anesthesia? No   14. Do you have sleep apnea, excessive snoring or daytime drowsiness? No   15. Do you have any artifical heart valves or other implanted medical devices like a pacemaker, defibrillator, or continuous glucose monitor? No   16. Do you have artificial joints? YES - ***   17. Are you allergic to latex? No       Health Care Directive:  Patient has a Health Care Directive on file      Preoperative Review of :  {Mnpmpreport:985149}  {Review MNPMP for all patients per [facility] MNPMP Profile:668233}    {Chronic problem details (Optional) :159455}    Review of Systems  {ROS Preop Choices:257379}    Patient Active Problem List    Diagnosis Date Noted     Allodynia 09/21/2021     Priority: Medium     Other complicated headache syndrome 09/21/2021     Priority: Medium     NAFLD (nonalcoholic fatty liver disease) 09/18/2019     Priority: Medium     Prediabetes 09/18/2019     Priority: Medium     Gastroesophageal reflux disease without esophagitis 09/18/2019     Priority: Medium     Mild intermittent asthma without complication 09/18/2019     Priority: Medium     Mixed hyperlipidemia 08/24/2018     Priority: Medium     Hyperglycemia 08/24/2018     Priority: Medium     Pulmonary nodules 08/24/2018     Priority: Medium     Obesity 01/24/2018     Priority: Medium     Nicotine use disorder 08/30/2016     Priority: Medium     Lupus (H) 05/06/2016     Priority: Medium     [place] encephalitis 05/06/2016     Priority: Medium     Formatting of this note might be different from the original.  SLE, inactive  Formatting of this note might be different from the original.  Formatting of this note might be different from the original.  SLE, inactive       Elevated LFTs 09/02/2013     Priority: Medium     HTN (hypertension) 05/18/2013     Priority: Medium      Disorder of bursae and tendons in shoulder region 08/12/2011     Priority: Medium     Other affections of shoulder region, not elsewhere classified 02/02/2010     Priority: Medium      Past Medical History:   Diagnosis Date     Bacterial pneumonia 02/02/2010     Bitten by dog 09/2012    hospitalized     Chronic sinusitis     Chronic Sinusitis     Nonrheumatic mitral valve insufficiency 02/2005    Trace Mitral Regurgitation, Echo.  Does not require SBE     Other shoulder lesions, left shoulder     L shoulder RTC tendonitis     Personal history of other medical treatment (CODE)     prophylaxis     Personal history of other medical treatment (CODE) 03/11/2005    Echocardiogram 3/11/05 for family history of mitral valve and aortic valve replacement.  Trace mitral regurgitation.  Mitral valve appeared normal.  Tricuspid aortic valve without AI or AS.  No focal wall motion abnormalities.  Ejection fraction 72% 3/12/05.     Primary osteoarthritis of right knee     No Comments Provided     Tobacco use     quit 9/2012     Past Surgical History:   Procedure Laterality Date     ARTHROSCOPY KNEE Right 08/2001    Dr. 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     ARTHROSCOPY SHOULDER  2012    2011 / 2012,Left, supraspinatus repair     COLONOSCOPY  01/10/2013    hyperplastic, 1/10/23     ECHOCARDIOGRAM INTRAOPERATIVE IN OR  03/11/2005    for family history of mitral valve and aortic valve replacement.  Trace mitral regurgitation.  Mitral valve appeared normal.  Tricuspid aortic valve without AI or AS.  No focal wall motion abnormalities.  Ejection fraction 72%     OTHER SURGICAL HISTORY  03/13/2006    PREMALIG/BENIGN SKIN LESION EXCISION,Excision of a benign verrucose keratosis with actinic damage and lentiginous change.     OTHER SURGICAL HISTORY  12/2006    UT SODIUM HYALONURATE PER INJECTION,Synvisc injection     TONSILLECTOMY      No Comments Provided     Current Outpatient Medications   Medication  "Sig Dispense Refill     ADVAIR DISKUS 250-50 MCG/DOSE inhaler TWICE DAILY 60 each 11     albuterol (PROAIR HFA/PROVENTIL HFA/VENTOLIN HFA) 108 (90 Base) MCG/ACT inhaler INHALE 2 PUFFS BY MOUTH DAILY AS NEEDED 18 g 11     atorvastatin (LIPITOR) 20 MG tablet Take 1 tablet (20 mg) by mouth At Bedtime 90 tablet 3     losartan-hydrochlorothiazide (HYZAAR) 50-12.5 MG tablet Take 1 tablet by mouth daily 90 tablet 3     metoprolol succinate ER (TOPROL-XL) 25 MG 24 hr tablet Take 1 tablet (25 mg) by mouth daily 90 tablet 3     omeprazole (PRILOSEC) 20 MG DR capsule TAKE 1 CAPSULE BY MOUTH EVERY DAY BEFORE A MEAL 90 capsule 2       Allergies   Allergen Reactions     Lisinopril      cough        Social History     Tobacco Use     Smoking status: Current Every Day Smoker     Packs/day: 0.75     Years: 45.00     Pack years: 33.75     Types: Cigarettes     Last attempt to quit: 10/1/2012     Years since quittin.6     Smokeless tobacco: Never Used   Substance Use Topics     Alcohol use: Not Currently     Alcohol/week: 3.0 standard drinks     {FAMILY HISTORY (Optional):418224750}  History   Drug Use No         Objective     LMP  (LMP Unknown)     Physical Exam  {EXAM Preop Choices:518737}    Recent Labs   Lab Test 22  0657 21  1435 21  0857   HGB 16.1* 15.3 15.5   PLT 97* 90* 112*   INR 1.04  --   --     138  --    POTASSIUM 3.8 3.9  --    CR 0.77 0.77  --    A1C  --   --  5.3        Diagnostics:  {LABS:257255}   {EK}    Revised Cardiac Risk Index (RCRI):  The patient has the following serious cardiovascular risks for perioperative complications:  {PREOP REVISED CARDIAC RISK INDEX (RCRI) :037378::\" - No serious cardiac risks = 0 points\"}     RCRI Interpretation: {REVISED CARDIAC RISK INTERPRETATION :928859}         Signed Electronically by: Briana Montoya PA-C  Copy of this evaluation report is provided to requesting physician.    {Provider Resources  Preop [facility] Preop " Guidelines  Revised Cardiac Risk Index :861208}

## 2022-05-18 LAB — AFP SERPL-MCNC: 2 UG/L (ref 0–8)

## 2022-05-19 RX ORDER — AZITHROMYCIN 250 MG/1
TABLET, FILM COATED ORAL
COMMUNITY
Start: 2022-05-11 | End: 2022-10-05

## 2022-05-21 ENCOUNTER — HEALTH MAINTENANCE LETTER (OUTPATIENT)
Age: 62
End: 2022-05-21

## 2022-05-23 ENCOUNTER — HOSPITAL ENCOUNTER (OUTPATIENT)
Dept: CARDIOLOGY | Facility: OTHER | Age: 62
Discharge: HOME | End: 2022-05-23
Attending: NURSE PRACTITIONER
Payer: COMMERCIAL

## 2022-05-23 ENCOUNTER — ALLIED HEALTH/NURSE VISIT (OUTPATIENT)
Dept: FAMILY MEDICINE | Facility: OTHER | Age: 62
End: 2022-05-23
Attending: PHYSICIAN ASSISTANT
Payer: COMMERCIAL

## 2022-05-23 DIAGNOSIS — I35.9 AORTIC VALVE CALCIFICATION: ICD-10-CM

## 2022-05-23 DIAGNOSIS — I10 ESSENTIAL HYPERTENSION: ICD-10-CM

## 2022-05-23 DIAGNOSIS — R00.0 TACHYCARDIA: ICD-10-CM

## 2022-05-23 DIAGNOSIS — R01.1 SYSTOLIC MURMUR: ICD-10-CM

## 2022-05-23 DIAGNOSIS — Z01.818 PREOP GENERAL PHYSICAL EXAM: ICD-10-CM

## 2022-05-23 LAB — LVEF ECHO: NORMAL

## 2022-05-23 PROCEDURE — C9803 HOPD COVID-19 SPEC COLLECT: HCPCS

## 2022-05-23 PROCEDURE — U0003 INFECTIOUS AGENT DETECTION BY NUCLEIC ACID (DNA OR RNA); SEVERE ACUTE RESPIRATORY SYNDROME CORONAVIRUS 2 (SARS-COV-2) (CORONAVIRUS DISEASE [COVID-19]), AMPLIFIED PROBE TECHNIQUE, MAKING USE OF HIGH THROUGHPUT TECHNOLOGIES AS DESCRIBED BY CMS-2020-01-R: HCPCS | Mod: ZL

## 2022-05-23 PROCEDURE — 93306 TTE W/DOPPLER COMPLETE: CPT

## 2022-05-23 PROCEDURE — 93306 TTE W/DOPPLER COMPLETE: CPT | Mod: 26 | Performed by: INTERNAL MEDICINE

## 2022-05-23 NOTE — PROGRESS NOTES
Patient here today for Covid test. Procedure 5/27/22.     Kristin Ziegler CNA .............................on 5/23/2022 at 9:11 AM

## 2022-05-24 LAB — SARS-COV-2 RNA RESP QL NAA+PROBE: NEGATIVE

## 2022-05-26 ENCOUNTER — ANESTHESIA EVENT (OUTPATIENT)
Dept: SURGERY | Facility: OTHER | Age: 62
End: 2022-05-26
Payer: COMMERCIAL

## 2022-05-27 ENCOUNTER — ANESTHESIA (OUTPATIENT)
Dept: SURGERY | Facility: OTHER | Age: 62
End: 2022-05-27
Payer: COMMERCIAL

## 2022-05-27 ENCOUNTER — HOSPITAL ENCOUNTER (OUTPATIENT)
Facility: OTHER | Age: 62
Discharge: HOME | End: 2022-05-27
Attending: SPECIALIST | Admitting: SPECIALIST
Payer: COMMERCIAL

## 2022-05-27 VITALS
OXYGEN SATURATION: 96 % | SYSTOLIC BLOOD PRESSURE: 111 MMHG | HEART RATE: 51 BPM | WEIGHT: 176.8 LBS | RESPIRATION RATE: 12 BRPM | BODY MASS INDEX: 32.34 KG/M2 | DIASTOLIC BLOOD PRESSURE: 60 MMHG | TEMPERATURE: 97 F

## 2022-05-27 LAB — GLUCOSE BLDC GLUCOMTR-MCNC: 118 MG/DL (ref 70–99)

## 2022-05-27 PROCEDURE — 250N000011 HC RX IP 250 OP 636: Performed by: NURSE ANESTHETIST, CERTIFIED REGISTERED

## 2022-05-27 PROCEDURE — 710N000012 HC RECOVERY PHASE 2, PER MINUTE: Performed by: SPECIALIST

## 2022-05-27 PROCEDURE — 26055 INCISE FINGER TENDON SHEATH: CPT | Performed by: NURSE ANESTHETIST, CERTIFIED REGISTERED

## 2022-05-27 PROCEDURE — 250N000009 HC RX 250: Performed by: NURSE ANESTHETIST, CERTIFIED REGISTERED

## 2022-05-27 PROCEDURE — 250N000011 HC RX IP 250 OP 636: Performed by: SPECIALIST

## 2022-05-27 PROCEDURE — 26055 INCISE FINGER TENDON SHEATH: CPT | Mod: F7 | Performed by: SPECIALIST

## 2022-05-27 PROCEDURE — 258N000003 HC RX IP 258 OP 636: Performed by: NURSE ANESTHETIST, CERTIFIED REGISTERED

## 2022-05-27 PROCEDURE — 250N000009 HC RX 250: Performed by: SPECIALIST

## 2022-05-27 PROCEDURE — 360N000074 HC SURGERY LEVEL 1, PER MIN: Performed by: SPECIALIST

## 2022-05-27 PROCEDURE — 370N000017 HC ANESTHESIA TECHNICAL FEE, PER MIN: Performed by: SPECIALIST

## 2022-05-27 PROCEDURE — 272N000001 HC OR GENERAL SUPPLY STERILE: Performed by: SPECIALIST

## 2022-05-27 PROCEDURE — 999N000141 HC STATISTIC PRE-PROCEDURE NURSING ASSESSMENT: Performed by: SPECIALIST

## 2022-05-27 PROCEDURE — 82962 GLUCOSE BLOOD TEST: CPT

## 2022-05-27 RX ORDER — FENTANYL CITRATE 50 UG/ML
INJECTION, SOLUTION INTRAMUSCULAR; INTRAVENOUS PRN
Status: DISCONTINUED | OUTPATIENT
Start: 2022-05-27 | End: 2022-05-27

## 2022-05-27 RX ORDER — FENTANYL CITRATE 50 UG/ML
50 INJECTION, SOLUTION INTRAMUSCULAR; INTRAVENOUS
Status: DISCONTINUED | OUTPATIENT
Start: 2022-05-27 | End: 2022-05-27 | Stop reason: HOSPADM

## 2022-05-27 RX ORDER — SODIUM CHLORIDE, SODIUM LACTATE, POTASSIUM CHLORIDE, CALCIUM CHLORIDE 600; 310; 30; 20 MG/100ML; MG/100ML; MG/100ML; MG/100ML
INJECTION, SOLUTION INTRAVENOUS CONTINUOUS
Status: DISCONTINUED | OUTPATIENT
Start: 2022-05-27 | End: 2022-05-27 | Stop reason: HOSPADM

## 2022-05-27 RX ORDER — MEPERIDINE HYDROCHLORIDE 50 MG/ML
12.5 INJECTION INTRAMUSCULAR; INTRAVENOUS; SUBCUTANEOUS
Status: DISCONTINUED | OUTPATIENT
Start: 2022-05-27 | End: 2022-05-27 | Stop reason: HOSPADM

## 2022-05-27 RX ORDER — FENTANYL CITRATE 50 UG/ML
50 INJECTION, SOLUTION INTRAMUSCULAR; INTRAVENOUS EVERY 5 MIN PRN
Status: DISCONTINUED | OUTPATIENT
Start: 2022-05-27 | End: 2022-05-27 | Stop reason: HOSPADM

## 2022-05-27 RX ORDER — NALOXONE HYDROCHLORIDE 0.4 MG/ML
0.4 INJECTION, SOLUTION INTRAMUSCULAR; INTRAVENOUS; SUBCUTANEOUS
Status: DISCONTINUED | OUTPATIENT
Start: 2022-05-27 | End: 2022-05-27 | Stop reason: HOSPADM

## 2022-05-27 RX ORDER — ONDANSETRON 2 MG/ML
4 INJECTION INTRAMUSCULAR; INTRAVENOUS EVERY 30 MIN PRN
Status: DISCONTINUED | OUTPATIENT
Start: 2022-05-27 | End: 2022-05-27 | Stop reason: HOSPADM

## 2022-05-27 RX ORDER — LIDOCAINE HYDROCHLORIDE 20 MG/ML
INJECTION, SOLUTION INFILTRATION; PERINEURAL PRN
Status: DISCONTINUED | OUTPATIENT
Start: 2022-05-27 | End: 2022-05-27

## 2022-05-27 RX ORDER — PROPOFOL 10 MG/ML
INJECTION, EMULSION INTRAVENOUS PRN
Status: DISCONTINUED | OUTPATIENT
Start: 2022-05-27 | End: 2022-05-27

## 2022-05-27 RX ORDER — LIDOCAINE 40 MG/G
CREAM TOPICAL
Status: DISCONTINUED | OUTPATIENT
Start: 2022-05-27 | End: 2022-05-27 | Stop reason: HOSPADM

## 2022-05-27 RX ORDER — ONDANSETRON 4 MG/1
4 TABLET, ORALLY DISINTEGRATING ORAL EVERY 30 MIN PRN
Status: DISCONTINUED | OUTPATIENT
Start: 2022-05-27 | End: 2022-05-27 | Stop reason: HOSPADM

## 2022-05-27 RX ORDER — OXYCODONE HYDROCHLORIDE 5 MG/1
5 TABLET ORAL EVERY 4 HOURS PRN
Status: DISCONTINUED | OUTPATIENT
Start: 2022-05-27 | End: 2022-05-27 | Stop reason: HOSPADM

## 2022-05-27 RX ORDER — PROPOFOL 10 MG/ML
INJECTION, EMULSION INTRAVENOUS CONTINUOUS PRN
Status: DISCONTINUED | OUTPATIENT
Start: 2022-05-27 | End: 2022-05-27

## 2022-05-27 RX ORDER — NALOXONE HYDROCHLORIDE 0.4 MG/ML
0.2 INJECTION, SOLUTION INTRAMUSCULAR; INTRAVENOUS; SUBCUTANEOUS
Status: DISCONTINUED | OUTPATIENT
Start: 2022-05-27 | End: 2022-05-27 | Stop reason: HOSPADM

## 2022-05-27 RX ORDER — ONDANSETRON 2 MG/ML
INJECTION INTRAMUSCULAR; INTRAVENOUS PRN
Status: DISCONTINUED | OUTPATIENT
Start: 2022-05-27 | End: 2022-05-27

## 2022-05-27 RX ORDER — HYDROMORPHONE HYDROCHLORIDE 1 MG/ML
0.4 INJECTION, SOLUTION INTRAMUSCULAR; INTRAVENOUS; SUBCUTANEOUS EVERY 5 MIN PRN
Status: DISCONTINUED | OUTPATIENT
Start: 2022-05-27 | End: 2022-05-27 | Stop reason: HOSPADM

## 2022-05-27 RX ADMIN — PROPOFOL 100 MCG/KG/MIN: 10 INJECTION, EMULSION INTRAVENOUS at 10:56

## 2022-05-27 RX ADMIN — MIDAZOLAM HYDROCHLORIDE 2 MG: 1 INJECTION, SOLUTION INTRAMUSCULAR; INTRAVENOUS at 10:53

## 2022-05-27 RX ADMIN — FENTANYL CITRATE 50 MCG: 50 INJECTION, SOLUTION INTRAMUSCULAR; INTRAVENOUS at 10:55

## 2022-05-27 RX ADMIN — SODIUM CHLORIDE, POTASSIUM CHLORIDE, SODIUM LACTATE AND CALCIUM CHLORIDE: 600; 310; 30; 20 INJECTION, SOLUTION INTRAVENOUS at 10:42

## 2022-05-27 RX ADMIN — PROPOFOL 50 MG: 10 INJECTION, EMULSION INTRAVENOUS at 10:56

## 2022-05-27 RX ADMIN — ONDANSETRON HYDROCHLORIDE 4 MG: 2 SOLUTION INTRAMUSCULAR; INTRAVENOUS at 10:56

## 2022-05-27 RX ADMIN — LIDOCAINE HYDROCHLORIDE 40 MG: 20 INJECTION, SOLUTION INFILTRATION; PERINEURAL at 10:56

## 2022-05-27 ASSESSMENT — LIFESTYLE VARIABLES: TOBACCO_USE: 1

## 2022-05-27 NOTE — ANESTHESIA PREPROCEDURE EVALUATION
Anesthesia Pre-Procedure Evaluation    Patient: Charleen Huddleston   MRN: [id number] : 1960        Procedure : Procedure(s):  Right Long (Middle) Trigger Finger Release          Past Medical History:   Diagnosis Date     Bacterial pneumonia 2010     Bitten by dog 2012    hospitalized     Chronic sinusitis     Chronic Sinusitis     Nonrheumatic mitral valve insufficiency 2005    Trace Mitral Regurgitation, Echo.  Does not require SBE     Other shoulder lesions, left shoulder     L shoulder RTC tendonitis     Personal history of other medical treatment (CODE)     prophylaxis     Personal history of other medical treatment (CODE) 2005    Echocardiogram 3/11/05 for family history of mitral valve and aortic valve replacement.  Trace mitral regurgitation.  Mitral valve appeared normal.  Tricuspid aortic valve without AI or AS.  No focal wall motion abnormalities.  Ejection fraction 72% 3/12/05.     Primary osteoarthritis of right knee     No Comments Provided     Tobacco use     quit 2012      Past Surgical History:   Procedure Laterality Date     ARTHROSCOPY KNEE Right 2001    Dr. 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     ARTHROSCOPY SHOULDER  2012    2011,Left, supraspinatus repair     COLONOSCOPY  01/10/2013    hyperplastic, 1/10/23     ECHOCARDIOGRAM INTRAOPERATIVE IN OR  2005    for family history of mitral valve and aortic valve replacement.  Trace mitral regurgitation.  Mitral valve appeared normal.  Tricuspid aortic valve without AI or AS.  No focal wall motion abnormalities.  Ejection fraction 72%     OTHER SURGICAL HISTORY  2006    PREMALIG/BENIGN SKIN LESION EXCISION,Excision of a benign verrucose keratosis with actinic damage and lentiginous change.     OTHER SURGICAL HISTORY  2006    TN SODIUM HYALONURATE PER INJECTION,Synvisc injection     TONSILLECTOMY      No Comments Provided     TOTAL KNEE ARTHROPLASTY Right       Allergies    Allergen Reactions     Lisinopril      cough      Social History     Tobacco Use     Smoking status: Current Every Day Smoker     Packs/day: 0.75     Years: 45.00     Pack years: 33.75     Types: Cigarettes     Last attempt to quit: 10/1/2012     Years since quittin.6     Smokeless tobacco: Never Used   Substance Use Topics     Alcohol use: Not Currently     Alcohol/week: 3.0 standard drinks      Wt Readings from Last 1 Encounters:   22 80.2 kg (176 lb 12.8 oz)        Anesthesia Evaluation   Pt has had prior anesthetic.     No history of anesthetic complications       ROS/MED HX  ENT/Pulmonary:     (+) tobacco use, Current use, Intermittent, asthma Treatment: Inhaler prn,      Neurologic:  - neg neurologic ROS     Cardiovascular:     (+) Dyslipidemia hypertension-----    METS/Exercise Tolerance: >4 METS    Hematologic:  - neg hematologic  ROS     Musculoskeletal:  - neg musculoskeletal ROS     GI/Hepatic:     (+) GERD, Asymptomatic on medication, liver disease,     Renal/Genitourinary:  - neg Renal ROS     Endo:     (+) Obesity,     Psychiatric/Substance Use:  - neg psychiatric ROS     Infectious Disease:  - neg infectious disease ROS     Malignancy:  - neg malignancy ROS     Other:  - neg other ROS          Physical Exam    Airway  airway exam normal      Mallampati: I   TM distance: > 3 FB   Neck ROM: full   Mouth opening: > 3 cm    Respiratory Devices and Support         Dental  no notable dental history         Cardiovascular   cardiovascular exam normal       Rhythm and rate: regular and normal     Pulmonary   pulmonary exam normal        breath sounds clear to auscultation           OUTSIDE LABS:  CBC:   Lab Results   Component Value Date    WBC 4.0 2022    WBC 4.4 2021    HGB 16.1 (H) 2022    HGB 15.3 2021    HCT 47.1 (H) 2022    HCT 44.0 2021    PLT 97 (L) 2022    PLT 90 (L) 2021     BMP:   Lab Results   Component Value Date     2022      09/21/2021    POTASSIUM 3.8 05/16/2022    POTASSIUM 3.9 09/21/2021    CHLORIDE 103 05/16/2022    CHLORIDE 103 09/21/2021    CO2 27 05/16/2022    CO2 22 09/21/2021    BUN 19 05/16/2022    BUN 14 09/21/2021    CR 0.77 05/16/2022    CR 0.77 09/21/2021     (H) 05/16/2022    GLC 91 09/21/2021     COAGS:   Lab Results   Component Value Date    INR 1.04 05/16/2022     POC: No results found for: BGM, HCG, HCGS  HEPATIC:   Lab Results   Component Value Date    ALBUMIN 4.8 05/16/2022    PROTTOTAL 8.2 05/16/2022    ALT 23 05/16/2022    AST 25 05/16/2022    ALKPHOS 82 05/16/2022    BILITOTAL 1.1 (H) 05/16/2022     OTHER:   Lab Results   Component Value Date    A1C 5.3 03/31/2021    DOYLE 9.9 05/16/2022    MAG 1.6 (L) 03/18/2021    CRP 1.4 09/21/2021    SED 7 09/21/2021       Anesthesia Plan    ASA Status:  2   NPO Status:  NPO Appropriate    Anesthesia Type: MAC.   Induction: Propofol.   Maintenance: Balanced.        Consents    Anesthesia Plan(s) and associated risks, benefits, and realistic alternatives discussed. Questions answered and patient/representative(s) expressed understanding.     - Discussed: Risks, Benefits and Alternatives for BOTH SEDATION and the PROCEDURE were discussed     - Discussed with:  Patient      - Extended Intubation/Ventilatory Support Discussed: No.      - Patient is DNR/DNI Status: No    Use of blood products discussed: Yes.     - Discussed with: Patient.     - Consented: consented to blood products            Reason for refusal: other.     Postoperative Care    Pain management: IV analgesics, Multi-modal analgesia.   PONV prophylaxis: Ondansetron (or other 5HT-3)     Comments:                SMITHA KEITH CRNA

## 2022-05-27 NOTE — PROGRESS NOTES
Visit Date: 2022    PREOPERATIVE DIAGNOSIS:  Right long finger trigger digit.    POSTOPERATIVE DIAGNOSIS:  Right long finger trigger digit.    PROCEDURE PERFORMED:  Right long finger trigger digit release.    SURGEON:  Flako Reno MD    ASSISTANT:  Da Lopez PA-C    ANESTHESIA:  Local with IV sedation.    INDICATIONS:  Arabella Reyes is a 62-year-old female with chronic right long finger trigger digit.  She failed nonoperative treatment and was offered surgical treatment.  The risks, complications and benefits reviewed and she elected to proceed.    DESCRIPTION OF PROCEDURE:  Following medical clearance, the patient was brought to the operating room and placed on the operating table.  Pneumatic tourniquet was placed about the right upper extremity.  The right upper extremity was prepped and draped in sterile manner.  Timeout was performed confirming surgical site, patient and procedure.  Local anesthetic was instilled over the A1 pulley of the right long finger.  The incision was then made, carried sharply down through skin and subcutaneous tissue.  Careful spreading was performed.  The A1 pulley was identified and sharply incised under direct vision.  Active range of motion with no evidence of triggering.  Wounds were then irrigated.  Tourniquet was inflated.  Circulation returned promptly to the hand and fingers.  Hemostasis with direct pressure.  The skin was closed with 4 o  suture.  Sterile dressing was applied and the patient was brought to recovery room in stable condition.    Flako Reno MD        D: 2022   T: 2022   MT: md/DAMIEN    Name:     ARABELLA REYES  MRN:      [id number]        Account:    [id number]   :      1960           Visit Date: 2022     Document: [id number]

## 2022-05-27 NOTE — ANESTHESIA CARE TRANSFER NOTE
Patient: Charleen Huddleston    Procedure: Procedure(s):  Right Long (Middle) Trigger Finger Release       Diagnosis: Trigger finger, right middle finger [M65.331]  Diagnosis Additional Information: No value filed.    Anesthesia Type:   MAC     Note:    Oropharynx: oropharynx clear of all foreign objects  Level of Consciousness: awake  Oxygen Supplementation: room air    Independent Airway: airway patency satisfactory and stable  Dentition: dentition unchanged  Vital Signs Stable: post-procedure vital signs reviewed and stable  Report to RN Given: handoff report given  Patient transferred to: Phase II    Handoff Report: Identifed the Patient, Identified the Reponsible Provider, Reviewed the pertinent medical history, Discussed the surgical course, Reviewed Intra-OP anesthesia mangement and issues during anesthesia, Set expectations for post-procedure period and Allowed opportunity for questions and acknowledgement of understanding      Vitals:  Vitals Value Taken Time   BP     Temp     Pulse     Resp     SpO2         Electronically Signed By: SMITHA KEITH CRNA  May 27, 2022  11:19 AM

## 2022-05-27 NOTE — OP NOTE
Procedure Date: 2022    PREOPERATIVE DIAGNOSIS:  Right long finger trigger digit.    POSTOPERATIVE DIAGNOSIS:  Right long finger trigger digit.    PROCEDURE PERFORMED:  Right long finger trigger digit release.    SURGEON:  Flako Reno MD    ASSISTANT:  Da Lopez PA-C    ANESTHESIA:  Local with IV sedation.    INDICATIONS:  Arabella Reyes is a 62-year-old female with chronic right long finger trigger digit.  She failed nonoperative treatment and was offered surgical treatment.  The risks, complications and benefits reviewed and she elected to proceed.    DESCRIPTION OF PROCEDURE:  Following medical clearance, the patient was brought to the operating room and placed on the operating table.  Pneumatic tourniquet was placed about the right upper extremity.  The right upper extremity was prepped and draped in sterile manner.  Timeout was performed confirming surgical site, patient and procedure.  Local anesthetic was instilled over the A1 pulley of the right long finger.  The incision was then made, carried sharply down through skin and subcutaneous tissue.  Careful spreading was performed.  The A1 pulley was identified and sharply incised under direct vision.  Active range of motion with no evidence of triggering.  Wounds were then irrigated.  Tourniquet was inflated.  Circulation returned promptly to the hand and fingers.  Hemostasis with direct pressure.  The skin was closed with 4 nylon  suture.  Sterile dressing was applied and the patient was brought to recovery room in stable condition.    Flako Reno MD        D: 2022   T: 2022   MT: md/DAMIEN    Name:     ARABELLA REYES  MRN:      [id number]        Account:        [id number]   :      1960           Procedure Date: 2022     Document: [id number]

## 2022-05-27 NOTE — DISCHARGE INSTRUCTIONS
[facility]  Adult Discharge Orders & Instructions      For 24 hours after surgery:  Get plenty of rest.  A responsible adult must stay with you for at least 24 hours after you leave the hospital.   You may feel lightheaded.  IF so, sit for a few minutes before standing.  Have someone help you get up.   You may have a slight fever. Call the doctor if your fever is over 101 F (38.3 C) (taken under the tongue) or lasts longer than 24 hours.  You may have a dry mouth, a sore throat, muscle aches or trouble sleeping.  These should go away after 24 hours.  Do not make important or legal decisions.  6.   Do not drive or use heavy equipment.  If you have weakness or tingling, don't drive or use heavy equipment until this feeling goes away.                                                                                                                                                                         To contact a doctor, call    [phone]______________       Surgeon Contact Information  If you have questions or concerns related to your procedure please contact your surgeon through [facility] at [phone].

## 2022-05-27 NOTE — PROGRESS NOTES
Charleen has been discharged to home at 1210 via private car accompanied by     Written discharge instructions were provided to patient and .  Prescriptions were NONE.  Patient states their pain is denies, and denies any nausea or dizziness upon discharge.    Patient and adult caring for them verbalize understanding of discharge instructions including no driving until tomorrow and no longer taking narcotic pain medications - no operating mechanical equipment and no making any important decisions.They understand reason for discharge, and necessary follow-up appointments.

## 2022-05-27 NOTE — ANESTHESIA POSTPROCEDURE EVALUATION
Patient: Charleen Huddleston    Procedure: Procedure(s):  Right Long (Middle) Trigger Finger Release       Anesthesia Type:  MAC    Note:  Disposition: Outpatient   Postop Pain Control: Uneventful            Sign Out: Well controlled pain   PONV: No   Neuro/Psych: Uneventful            Sign Out: Acceptable/Baseline neuro status   Airway/Respiratory: Uneventful            Sign Out: Acceptable/Baseline resp. status   CV/Hemodynamics: Uneventful            Sign Out: Acceptable CV status; No obvious hypovolemia; No obvious fluid overload   Other NRE: NONE   DID A NON-ROUTINE EVENT OCCUR? No           Last vitals:  Vitals Value Taken Time   /60 05/27/22 1200   Temp 97  F (36.1  C) 05/27/22 1122   Pulse 51 05/27/22 1200   Resp 12 05/27/22 1145   SpO2 97 % 05/27/22 1203   Vitals shown include unvalidated device data.    Electronically Signed By: SMITHA KEITH CRNA  May 27, 2022  12:21 PM

## 2022-05-27 NOTE — BRIEF OP NOTE
[facility]    Brief Operative Note    Pre-operative diagnosis: Trigger finger, right middle finger [M65.331]  Post-operative diagnosis Same as pre-operative diagnosis    Procedure: Procedure(s):  Right Long (Middle) Trigger Finger Release  Surgeon: Surgeon(s) and Role:     * Flako Reno MD - Primary     * Da Lopez PA - Assisting  Anesthesia: Combined MAC with Local   Estimated Blood Loss: None    Drains: None  Specimens: * No specimens in log *  Findings:   None.  Complications: None.  Implants: * No implants in log *

## 2022-05-27 NOTE — INTERVAL H&P NOTE
"The History and Physical has been reviewed, the patient has been examined and no changes have occurred in the patient's condition since the H & P was completed.         Clinical Conditions Present on Arrival:  Clinically Significant Risk Factors Present on Admission                  # Thrombocytopenia: Plts = N/A on admission, will monitor for bleeding  # Obesity: Estimated body mass index is 32.34 kg/m  as calculated from the following:    Height as of 5/17/22: 1.575 m (5' 2\").    Weight as of 5/17/22: 80.2 kg (176 lb 12.8 oz).       "

## 2022-05-28 DIAGNOSIS — K21.9 GASTROESOPHAGEAL REFLUX DISEASE WITHOUT ESOPHAGITIS: ICD-10-CM

## 2022-05-31 NOTE — TELEPHONE ENCOUNTER
"Requested Prescriptions   Pending Prescriptions Disp Refills     omeprazole (PRILOSEC) 20 MG DR capsule [Pharmacy Med Name: OMEPRAZOLE 20MG DR CAPSULE] 90 capsule 2     Sig: TAKE 1 CAPSULE BY MOUTH EVERY DAY BEFORE A MEAL       PPI Protocol Passed - 5/28/2022  1:02 AM        Passed - Not on Clopidogrel (unless Pantoprazole ordered)        Passed - No diagnosis of osteoporosis on record        Passed - Recent (12 mo) or future (30 days) visit within the authorizing provider's specialty     Patient has had an office visit with the authorizing provider or a provider within the authorizing providers department within the previous 12 mos or has a future within next 30 days. See \"Patient Info\" tab in inbasket, or \"Choose Columns\" in Meds & Orders section of the refill encounter.              Passed - Medication is active on med list        Passed - Patient is age 18 or older        Passed - No active pregnacy on record        Passed - No positive pregnancy test in past 12 months     Last Written Prescription Date:  6/17/21  Last Fill Quantity: 90,   # refills: 2  Last Office Visit: 5/17/22 Lilly  Future Office visit:    Next 5 appointments (look out 90 days)    Jun 03, 2022 10:30 AM  Return Visit with Flako Reno MD  [facility] ([facility] ) [address]  [place] [address] [phone]         Routing refill request to provider for review/approval:  Brenda J. Goodell, RN on 5/31/2022 at 3:36 PM      "

## 2022-06-03 ENCOUNTER — OFFICE VISIT (OUTPATIENT)
Dept: ORTHOPEDICS | Facility: OTHER | Age: 62
End: 2022-06-03
Attending: SPECIALIST
Payer: COMMERCIAL

## 2022-06-03 DIAGNOSIS — M65.331 TRIGGER MIDDLE FINGER OF RIGHT HAND: Primary | ICD-10-CM

## 2022-06-03 PROCEDURE — 99024 POSTOP FOLLOW-UP VISIT: CPT | Performed by: SPECIALIST

## 2022-06-03 NOTE — NURSING NOTE
"Chief Complaint   Patient presents with     RECHECK     S/P Right CTR      Pt is here today s/p right ctr.     Jaymie Beyer LPN on 6/3/2022 at 10:52 AM     Initial LMP  (LMP Unknown)  Estimated body mass index is 32.34 kg/m  as calculated from the following:    Height as of 5/17/22: 1.575 m (5' 2\").    Weight as of 5/27/22: 80.2 kg (176 lb 12.8 oz).  Medication Reconciliation: complete    Jaymie Beyer LPN  "

## 2022-06-03 NOTE — PROGRESS NOTES
Visit Date: 2022    SUBJECTIVE: Arabella returns for followup 1 week status post right long finger trigger digit release.  She is back today very pleased with her results.    PHYSICAL EXAM:  Examination today confirms her incision to be healing nicely.  There is no evidence of triggering.  Range of motion is well preserved.    ASSESSMENT AND PLAN:  Status post right long finger trigger digit release 1 week out, sutures were removed.  Steri-Strips were applied.  We will advance her activities and see her in 4 weeks as symptoms warrant it.    Flako Reno MD        D: 2022   T: 2022   MT: SONAI    Name:     ARABELLA REYES  MRN:      [id number]        Account:    [id number]   :      1960           Visit Date: 2022     Document: [id number]

## 2022-06-15 NOTE — CONSULTS
Addendum to anesthesia record from 5/27/22.  50 mcg Fentanyl given IV at 1110 intraoperatively by me.    HANNA RAMOS, APRN CRNA on 6/15/2022 at 7:48 AM

## 2022-10-05 ENCOUNTER — OFFICE VISIT (OUTPATIENT)
Dept: CARDIOLOGY | Facility: OTHER | Age: 62
End: 2022-10-05
Attending: NURSE PRACTITIONER
Payer: COMMERCIAL

## 2022-10-05 VITALS
RESPIRATION RATE: 16 BRPM | DIASTOLIC BLOOD PRESSURE: 70 MMHG | WEIGHT: 177 LBS | OXYGEN SATURATION: 97 % | BODY MASS INDEX: 32.37 KG/M2 | TEMPERATURE: 97.2 F | SYSTOLIC BLOOD PRESSURE: 110 MMHG | HEART RATE: 64 BPM

## 2022-10-05 DIAGNOSIS — K74.00 LIVER FIBROSIS: ICD-10-CM

## 2022-10-05 DIAGNOSIS — E78.2 MIXED HYPERLIPIDEMIA: ICD-10-CM

## 2022-10-05 DIAGNOSIS — R00.0 TACHYCARDIA: ICD-10-CM

## 2022-10-05 DIAGNOSIS — F17.200 NICOTINE USE DISORDER: ICD-10-CM

## 2022-10-05 DIAGNOSIS — K21.9 GASTROESOPHAGEAL REFLUX DISEASE WITHOUT ESOPHAGITIS: ICD-10-CM

## 2022-10-05 DIAGNOSIS — R00.0 SINUS TACHYCARDIA: Primary | ICD-10-CM

## 2022-10-05 DIAGNOSIS — I35.9 AORTIC VALVE CALCIFICATION: ICD-10-CM

## 2022-10-05 DIAGNOSIS — R73.03 PREDIABETES: ICD-10-CM

## 2022-10-05 DIAGNOSIS — I10 ESSENTIAL HYPERTENSION: ICD-10-CM

## 2022-10-05 PROCEDURE — 99214 OFFICE O/P EST MOD 30 MIN: CPT | Performed by: NURSE PRACTITIONER

## 2022-10-05 RX ORDER — METOPROLOL SUCCINATE 25 MG/1
25 TABLET, EXTENDED RELEASE ORAL DAILY
Qty: 180 TABLET | Refills: 1 | Status: SHIPPED | OUTPATIENT
Start: 2022-10-05 | End: 2022-10-17

## 2022-10-05 RX ORDER — LOSARTAN POTASSIUM AND HYDROCHLOROTHIAZIDE 12.5; 5 MG/1; MG/1
1 TABLET ORAL DAILY
Qty: 180 TABLET | Refills: 1 | Status: SHIPPED | OUTPATIENT
Start: 2022-10-05 | End: 2022-10-17

## 2022-10-05 RX ORDER — ATORVASTATIN CALCIUM 20 MG/1
20 TABLET, FILM COATED ORAL AT BEDTIME
Qty: 180 TABLET | Refills: 1 | Status: SHIPPED | OUTPATIENT
Start: 2022-10-05 | End: 2022-10-17

## 2022-10-05 RX ORDER — CLOBETASOL PROPIONATE 0.5 MG/G
OINTMENT TOPICAL
COMMUNITY
Start: 2022-07-06

## 2022-10-05 ASSESSMENT — PAIN SCALES - GENERAL: PAINLEVEL: NO PAIN (0)

## 2022-10-05 NOTE — NURSING NOTE
"Chief Complaint   Patient presents with     Follow Up     Yearly follow up       Initial /70 (BP Location: Right arm, Patient Position: Sitting, Cuff Size: Adult Regular)   Pulse 64   Temp 97.2  F (36.2  C) (Tympanic)   Resp 16   Wt 80.3 kg (177 lb)   LMP  (LMP Unknown)   SpO2 97%   BMI 32.37 kg/m   Estimated body mass index is 32.37 kg/m  as calculated from the following:    Height as of 5/17/22: 1.575 m (5' 2\").    Weight as of this encounter: 80.3 kg (177 lb).  Meds Reconciled: complete  Pt is not on Aspirin  Pt is on a Statin  PHQ and/or SARAH reviewed. Pt referred to PCP/MH Provider as appropriate.    Jovanna Pat LPN      "

## 2022-10-05 NOTE — PROGRESS NOTES
[facility] HEART CARE   CARDIOLOGY PROGRESS NOTE    Charleen Huddleston   [address] [address] [facility] [id number]      Elisa Doherty     Chief Complaint   Patient presents with     Follow Up     Yearly follow up        HPI:   Ms. Huddleston is a 62 year old female who presents for cardiology follow-up to visit on 12/15/21. She was initially evaluated by cardiology with equivocal stress test and racing heart palpitations.  She has a history of hypertension, hyperlipidemia, prediabetes, SLE, ongoing tobacco abuse, elevated liver enzymes, hepatic steatosis and alcohol-related thrombocytopenia.  Patient does have a family history of structural heart disease, mitral disease in her father and sister.    Patient reported symptoms of racing heart back in March 2021.  She noted the racing heart with a heart rate up to 150s on her Fitbit.  She did not describe any chest pain or pressure at that time, no increased dyspnea.  She had also been identified to be hypertensive, she had been on losartan hydrochlorothiazide 50-12.5 mg daily.    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 Her FibroScan was completed on 5/13/2021 revealing a F4 fibrosis. No hepatoma on recent US. She is working on weight loss and regular exercise, Mediterranean diet. She has also been abstinent of ETOH use.     Previous echocardiogram in November 2013 revealing normal LV size and function, estimated EF at 65% without regional wall motion abnormalities.  Normal RV size and function.  Mild LA enlargement.  Borderline LVH.  No significant valvular heart disease identified.  The aortic and mitral valves appeared structurally normal.    Last echocardiogram on 4/16/2021 revealing normal global and regional LV function, LVEF 60 to 65%.  Borderline concentric wall thickening of the LV.  No regional wall motion abnormalities.  Normal RV size and function.  Both atria appeared normal with atrial septum intact.  The mitral  valve is normal in structure and function.  Mild aortic valve calcification with a mean aortic valve pressure gradient of 10 mmHg and a calculated JANI 1.6 cm , RVSP 22.8 mmHg plus RAP, normal pulmonary artery systolic pressure.  No pericardial effusion.    An exercise stress test was ordered secondary to hypertension uncontrolled and racing heart.  This was performed as an exercise stress echocardiogram on 5/4/2021.  This was a submaximal, likely low risk exercise stress echocardiogram.  Patient only achieved 80% of the maximum predicted heart rate.  Normal LV size and function at baseline.  Mild LVH.  The LVEF was 55 to 60% at rest and increased appropriately to greater than 70% after exercise with appropriate decrease in LV cavity size.  Normal heart rate response to exercise.  Hypertensive response to exercise.  No anginal symptoms were endorsed during the study.  Doppler exam with no significant valvular abnormalities.  Her resting ECG revealed nonspecific ST and T wave changes.  Tracings throughout exercise without ectopy of significance.  There was some slight ST depression at almost 1 mm in the inferior and lateral leads with peak exercise.  In the recovery phase, no ectopy and continued ST changes were seen.  No evidence of exercise-induced dysrhythmia.  Inconclusive EKG portion of the stress test for exercise-induced myocardial ischemia.    ZIO monitor ordered for racing heart palpitations (4/20-5/2/21) revealing underlying sinus rhythm.  Average heart rate 76 bpm.  The heart rate ranged from 53 to 116 bpm.  No patient triggered events.  3 nonsustained runs of SVT occurred with the longest lasting 5 beats.  No VT, AF/AFL, symptomatic bradycardia, pauses, second-degree Mobitz type II or third-degree AV block.  Rare SVE and VE, less than 1%.  Ventricular bigeminy was present lasting up to 5.1 seconds.    INTERVAL HISTORY:  Today she describes feeling well. Palpitations resolved without any recurrence. No  chest pain or pressure. Dyspnea improved. No edema. No ETOH use since April 2021. BP has been well controlled.     RELEVANT TESTING:  Echocardiogram 5/23/22  Interpretation Summary  Global and regional left ventricular function is hyperkinetic with an EF of  65-70%.  Right ventricular function, chamber size, wall motion, and thickness are  normal.  Pulmonary artery systolic pressure is normal.  The inferior vena cava is normal.  No pericardial effusion is present.  No significant changes noted.      PAST MEDICAL HISTORY:   Past Medical History:   Diagnosis Date     Bacterial pneumonia 02/02/2010     Bitten by dog 09/2012    hospitalized     Chronic sinusitis     Chronic Sinusitis     Nonrheumatic mitral valve insufficiency 02/2005    Trace Mitral Regurgitation, Echo.  Does not require SBE     Other shoulder lesions, left shoulder     L shoulder RTC tendonitis     Personal history of other medical treatment (CODE)     prophylaxis     Personal history of other medical treatment (CODE) 03/11/2005    Echocardiogram 3/11/05 for family history of mitral valve and aortic valve replacement.  Trace mitral regurgitation.  Mitral valve appeared normal.  Tricuspid aortic valve without AI or AS.  No focal wall motion abnormalities.  Ejection fraction 72% 3/12/05.     Primary osteoarthritis of right knee     No Comments Provided     Tobacco use     quit 9/2012          FAMILY HISTORY:   Family History   Problem Relation Age of Onset     Hyperlipidemia Mother         Hyperlipidemia,Hyperlipidemia     Other - See Comments Mother         Stroke,CVA 03/07     Heart Disease Mother         Heart Disease,aortic and mitral valve replacements, rheumatic fever     Mitral Valve Replacement Mother      Diabetes Father         Diabetes     Mitral Valve Replacement Father      Rheumatic fever Father      Diabetes Sister      Polycystic ovary syndrome Child      Thyroid Disease Child      Heart Disease Sister         Heart Disease,Valvular  heart disease and diffuse athersclerosis on brain MRI     Other - See Comments Sister         rheumatic fever     Rheumatic fever Sister      Cerebrovascular Disease Sister      Other - See Comments Maternal Grandmother         Stroke,CVA     Prostate Cancer Paternal Grandfather         Cancer-prostate     Hypertension Sister      Hyperlipidemia Sister           PAST SURGICAL HISTORY:   Past Surgical History:   Procedure Laterality Date     ARTHROSCOPY KNEE Right 08/2001    Dr. 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     ARTHROSCOPY SHOULDER  2012    2011 / 2012,Left, supraspinatus repair     COLONOSCOPY  01/10/2013    hyperplastic, 1/10/23     ECHOCARDIOGRAM INTRAOPERATIVE IN OR  03/11/2005    for family history of mitral valve and aortic valve replacement.  Trace mitral regurgitation.  Mitral valve appeared normal.  Tricuspid aortic valve without AI or AS.  No focal wall motion abnormalities.  Ejection fraction 72%     OTHER SURGICAL HISTORY  03/13/2006    PREMALIG/BENIGN SKIN LESION EXCISION,Excision of a benign verrucose keratosis with actinic damage and lentiginous change.     OTHER SURGICAL HISTORY  12/2006    IN SODIUM HYALONURATE PER INJECTION,Synvisc injection     RELEASE TRIGGER FINGER N/A 5/27/2022    Procedure: Right Long (Middle) Trigger Finger Release;  Surgeon: Flako Reno MD;  Location: [facility] OR     TONSILLECTOMY      No Comments Provided     TOTAL KNEE ARTHROPLASTY Right           SOCIAL HISTORY:   Social History     Socioeconomic History     Marital status:      Spouse name: None     Number of children: None     Years of education: None     Highest education level: None   Occupational History     None   Social Needs     Financial resource strain: None     Food insecurity     Worry: None     Inability: None     Transportation needs     Medical: None     Non-medical: None   Tobacco Use     Smoking status: Current Every Day Smoker     Packs/day: 0.75     Years:  45.00     Pack years: 33.75     Types: Cigarettes     Last attempt to quit: 10/1/2012     Years since quittin.7     Smokeless tobacco: Never Used   Substance and Sexual Activity     Alcohol use: Yes     Alcohol/week: 3.0 standard drinks     Frequency: Monthly or less     Drug use: No     Sexual activity: Yes     Partners: Male   Lifestyle     Physical activity     Days per week: None     Minutes per session: None     Stress: None   Relationships     Social connections     Talks on phone: None     Gets together: None     Attends Shinto service: None     Active member of club or organization: None     Attends meetings of clubs or organizations: None     Relationship status: None     Intimate partner violence     Fear of current or ex partner: None     Emotionally abused: None     Physically abused: None     Forced sexual activity: None   Other Topics Concern     None   Social History Narrative    .  Works at the school district.   works at [employer].      She has 3 grown children.    [place] on [address]. Quit smoking 2012          CURRENT MEDICATIONS:   Current Outpatient Medications   Medication     albuterol (PROAIR HFA/PROVENTIL HFA/VENTOLIN HFA) 108 (90 Base) MCG/ACT inhaler     clobetasol (TEMOVATE) 0.05 % external ointment     atorvastatin (LIPITOR) 20 MG tablet     fluticasone-salmeterol (ADVAIR DISKUS) 250-50 MCG/ACT inhaler     losartan-hydrochlorothiazide (HYZAAR) 50-12.5 MG tablet     metoprolol succinate ER (TOPROL XL) 25 MG 24 hr tablet     omeprazole (PRILOSEC) 20 MG DR capsule     No current facility-administered medications for this visit.       ALLERGIES:   Allergies   Allergen Reactions     Lisinopril      cough          ROS:   CONSTITUTIONAL: No reported fever or chills. No changes in weight.  ENT: No visual disturbance, ear ache, epistaxis or sore throat.   CARDIOVASCULAR: No recurrence of chest pains or chest pressure. Palpitations have completely resolved. No increased  lower extremity edema.   RESPIRATORY: Dyspnea improved.  No cough, wheezing or hemoptysis.  No reports of orthopnea or PND.  GI: No reported abdominal pain, melena or hematochezia.  : No reported hematuria or dysuria.  NEUROLOGICAL: No lightheadedness, dizziness, syncope, ataxia, paresthesias or weakness.   HEMATOLOGIC: No history of anemia. No bleeding or excessive bruising. No history of blood clots.   MUSCULOSKELETAL: No new joint pain or swelling, no muscle pain.  ENDOCRINOLOGIC: No temperature intolerance. No hair or skin changes.  SKIN: No abnormal rashes or sores, no unusual itching.  PSYCHIATRIC: No history of depression or anxiety. No changes in mood, feeling down or anxious. No changes in sleep.      PHYSICAL EXAM:   /70 (BP Location: Right arm, Patient Position: Sitting, Cuff Size: Adult Regular)   Pulse 64   Temp 97.2  F (36.2  C) (Tympanic)   Resp 16   Wt 80.3 kg (177 lb)   LMP  (LMP Unknown)   SpO2 97%   BMI 32.37 kg/m    GENERAL: The patient is a well-developed, well-nourished, in no apparent distress.  HEENT: Head is normocephalic and atraumatic. Eyes are symmetrical with normal visual tracking. No icterus, no xanthelasmas. Nares appeared normal without nasal drainage. Mucous membranes are moist, no cyanosis.  NECK: Supple. No cervical bruits, JVP not visible.   CHEST/ LUNGS: Lungs clear to auscultation, no rales, rhonchi or wheezes, no use of accessory muscles, no retractions, respirations unlabored and normal respiratory rate.   CARDIO: Regular rate and rhythm normal with S1 and S2, presence of grade II! systolic murmur with radiation to carotids, no click or rub.   ABD: Abdomen is nondistended.   EXTREMITIES: No peripheral edema.  MUSCULOSKELETAL: No visible joint swelling.   NEUROLOGIC: Alert and oriented X3. Normal speech, gait and affect. No focal neurologic deficits.   SKIN: No jaundice. No rashes or visible skin lesions present. No ecchymosis.       LAB RESULTS:   Admission on  05/27/2022, Discharged on 05/27/2022   Component Date Value Ref Range Status     GLUCOSE BY METER POCT 05/27/2022 118 (H)  70 - 99 mg/dL Final       ASSESSMENT:   Charleen Huddleston presents for cardiology follow-up to visit on 12/15/21. She was initially evaluated by cardiology with equivocal stress test and racing heart palpitations.  She has a history of hypertension, hyperlipidemia, prediabetes, SLE, ongoing tobacco abuse, elevated liver enzymes, hepatic steatosis and alcohol-related thrombocytopenia.   Today she describes feeling well. Palpitations resolved without any recurrence. No chest pain or pressure. Dyspnea improved. No edema. No ETOH use since April 2021. BP has been well controlled.     1. Sinus tachycardia  2. Essential hypertension  3. Liver fibrosis  4. Prediabetes  5. Nicotine use disorder  6. Mixed hyperlipidemia  7. Aortic valve calcification  8. Tachycardia  9. Gastroesophageal reflux disease without esophagitis      PLAN:   1. Racing heart palpitations resolved and BP well controlled.  She will continue on Toprol XL 25 mg daily, she will also continue on Losartan-hydrochlorothiazide 50-12.5 mg daily for HTN. She does not endorse any anginal symptoms.   2. Previously reviewed recent stress echocardiogram on 5/4/2021 which was submaximal, only 80% of the maximum predicted heart rate achieved. No evidence of stress induced myocardial ischemia or past infarct. The LVEF was 55 to 60% at rest and increased appropriately to greater than 70% after exercise with appropriate decrease in LV cavity size.  Normal heart rate response to exercise.  Hypertensive response to exercise.  No anginal symptoms were endorsed during the study.  Doppler exam with no significant valvular abnormalities.  Her resting ECG revealed nonspecific ST and T wave changes.  Tracings throughout exercise without ectopy of significance.  There was some slight ST depression at almost 1 mm in the inferior and lateral leads with peak  exercise.  In the recovery phase, no ectopy and continued ST changes were seen.  No evidence of exercise-induced dysrhythmia.  Inconclusive EKG portion of the stress test for exercise-induced myocardial ischemia.  3. ECG changes <2mm ST depression, nondiagnostic. Suspected the minimal ST changes secondary to hypertensive response to exercise at that time. Again, no anginal symptoms endorsed. No indication for further testing at this time.   4. 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 Her FibroScan was completed on 5/13/2021 revealing a F4 fibrosis. No hepatoma on recent US. She is working on weight loss and regular exercise, Mediterranean diet. She has also been abstinent of ETOH use.   5. Reviewed stable TTE (5/23/2022) revealing normal global and regional LV function, LVEF 65 to 70%, no regional wall motion abnormalities. No hemodynamically significant valvular abnormalities.  No pulmonary hypertension.  No pericardial effusion.  6.  Continue on atorvastatin 20 mg at bedtime repeat fasting lipids at visit with PCP.   9.  Follow-up with cardiology in 1 year, certainly sooner if needed.    Thank you for allowing me to participate in the care of your patient. Please do not hesitate to contact me if you have any questions.     Bianca Whitney, SMITHA CNP [facility]

## 2022-10-06 ENCOUNTER — HOSPITAL ENCOUNTER (OUTPATIENT)
Dept: MAMMOGRAPHY | Facility: OTHER | Age: 62
Discharge: HOME | End: 2022-10-06
Attending: NURSE PRACTITIONER | Admitting: NURSE PRACTITIONER
Payer: COMMERCIAL

## 2022-10-06 DIAGNOSIS — Z12.31 VISIT FOR SCREENING MAMMOGRAM: ICD-10-CM

## 2022-10-06 PROCEDURE — 77067 SCR MAMMO BI INCL CAD: CPT

## 2022-10-12 DIAGNOSIS — K21.9 GASTROESOPHAGEAL REFLUX DISEASE WITHOUT ESOPHAGITIS: ICD-10-CM

## 2022-10-17 ENCOUNTER — OFFICE VISIT (OUTPATIENT)
Dept: INTERNAL MEDICINE | Facility: OTHER | Age: 62
End: 2022-10-17
Attending: NURSE PRACTITIONER
Payer: COMMERCIAL

## 2022-10-17 VITALS
DIASTOLIC BLOOD PRESSURE: 72 MMHG | HEART RATE: 66 BPM | HEIGHT: 63 IN | RESPIRATION RATE: 16 BRPM | WEIGHT: 180 LBS | BODY MASS INDEX: 31.89 KG/M2 | TEMPERATURE: 97 F | SYSTOLIC BLOOD PRESSURE: 128 MMHG | OXYGEN SATURATION: 98 %

## 2022-10-17 DIAGNOSIS — I10 ESSENTIAL HYPERTENSION: ICD-10-CM

## 2022-10-17 DIAGNOSIS — Z23 NEED FOR TETANUS, DIPHTHERIA, AND ACELLULAR PERTUSSIS (TDAP) VACCINE: ICD-10-CM

## 2022-10-17 DIAGNOSIS — I10 PRIMARY HYPERTENSION: ICD-10-CM

## 2022-10-17 DIAGNOSIS — R00.0 TACHYCARDIA: ICD-10-CM

## 2022-10-17 DIAGNOSIS — E78.2 MIXED HYPERLIPIDEMIA: ICD-10-CM

## 2022-10-17 DIAGNOSIS — R91.8 PULMONARY NODULES: ICD-10-CM

## 2022-10-17 DIAGNOSIS — Z87.891 PERSONAL HISTORY OF TOBACCO USE: ICD-10-CM

## 2022-10-17 DIAGNOSIS — J45.20 MILD INTERMITTENT ASTHMA WITHOUT COMPLICATION: ICD-10-CM

## 2022-10-17 DIAGNOSIS — K21.9 GASTROESOPHAGEAL REFLUX DISEASE WITHOUT ESOPHAGITIS: ICD-10-CM

## 2022-10-17 DIAGNOSIS — Z23 NEED FOR PNEUMOCOCCAL VACCINATION: ICD-10-CM

## 2022-10-17 DIAGNOSIS — R73.9 HYPERGLYCEMIA: ICD-10-CM

## 2022-10-17 DIAGNOSIS — K76.0 NAFLD (NONALCOHOLIC FATTY LIVER DISEASE): ICD-10-CM

## 2022-10-17 DIAGNOSIS — Z00.00 HEALTH CARE MAINTENANCE: Primary | ICD-10-CM

## 2022-10-17 LAB
ALBUMIN SERPL-MCNC: 4.5 G/DL (ref 3.5–5.7)
ALP SERPL-CCNC: 70 U/L (ref 34–104)
ALT SERPL W P-5'-P-CCNC: 21 U/L (ref 7–52)
ANION GAP SERPL CALCULATED.3IONS-SCNC: 9 MMOL/L (ref 3–14)
AST SERPL W P-5'-P-CCNC: 23 U/L (ref 13–39)
BILIRUB SERPL-MCNC: 1.2 MG/DL (ref 0.3–1)
BUN SERPL-MCNC: 12 MG/DL (ref 7–25)
CALCIUM SERPL-MCNC: 10 MG/DL (ref 8.6–10.3)
CHLORIDE BLD-SCNC: 103 MMOL/L (ref 98–107)
CHOLEST SERPL-MCNC: 185 MG/DL
CO2 SERPL-SCNC: 28 MMOL/L (ref 21–31)
CREAT SERPL-MCNC: 0.81 MG/DL (ref 0.6–1.2)
FASTING STATUS PATIENT QL REPORTED: NORMAL
GFR SERPL CREATININE-BSD FRML MDRD: 81 ML/MIN/1.73M2
GLUCOSE BLD-MCNC: 111 MG/DL (ref 70–105)
HBA1C MFR BLD: 5.4 % (ref 4–6.2)
HDLC SERPL-MCNC: 73 MG/DL (ref 23–92)
LDLC SERPL CALC-MCNC: 91 MG/DL
NONHDLC SERPL-MCNC: 112 MG/DL
POTASSIUM BLD-SCNC: 4.2 MMOL/L (ref 3.5–5.1)
PROT SERPL-MCNC: 7.4 G/DL (ref 6.4–8.9)
SODIUM SERPL-SCNC: 140 MMOL/L (ref 134–144)
TRIGL SERPL-MCNC: 105 MG/DL

## 2022-10-17 PROCEDURE — 83036 HEMOGLOBIN GLYCOSYLATED A1C: CPT | Mod: ZL | Performed by: NURSE PRACTITIONER

## 2022-10-17 PROCEDURE — 90471 IMMUNIZATION ADMIN: CPT | Performed by: NURSE PRACTITIONER

## 2022-10-17 PROCEDURE — 99396 PREV VISIT EST AGE 40-64: CPT | Mod: 25 | Performed by: NURSE PRACTITIONER

## 2022-10-17 PROCEDURE — G0296 VISIT TO DETERM LDCT ELIG: HCPCS | Performed by: NURSE PRACTITIONER

## 2022-10-17 PROCEDURE — 80061 LIPID PANEL: CPT | Mod: ZL | Performed by: NURSE PRACTITIONER

## 2022-10-17 PROCEDURE — 90472 IMMUNIZATION ADMIN EACH ADD: CPT | Performed by: NURSE PRACTITIONER

## 2022-10-17 PROCEDURE — 82040 ASSAY OF SERUM ALBUMIN: CPT | Mod: ZL | Performed by: NURSE PRACTITIONER

## 2022-10-17 PROCEDURE — 80053 COMPREHEN METABOLIC PANEL: CPT | Mod: ZL | Performed by: NURSE PRACTITIONER

## 2022-10-17 PROCEDURE — 90715 TDAP VACCINE 7 YRS/> IM: CPT | Performed by: NURSE PRACTITIONER

## 2022-10-17 PROCEDURE — 36415 COLL VENOUS BLD VENIPUNCTURE: CPT | Mod: ZL | Performed by: NURSE PRACTITIONER

## 2022-10-17 PROCEDURE — 90732 PPSV23 VACC 2 YRS+ SUBQ/IM: CPT | Performed by: NURSE PRACTITIONER

## 2022-10-17 RX ORDER — FLUTICASONE PROPIONATE AND SALMETEROL 250; 50 UG/1; UG/1
POWDER RESPIRATORY (INHALATION)
Qty: 60 EACH | Refills: 11 | Status: SHIPPED | OUTPATIENT
Start: 2022-10-17 | End: 2023-09-18

## 2022-10-17 RX ORDER — LOSARTAN POTASSIUM AND HYDROCHLOROTHIAZIDE 12.5; 5 MG/1; MG/1
1 TABLET ORAL DAILY
Qty: 180 TABLET | Refills: 3 | Status: SHIPPED | OUTPATIENT
Start: 2022-10-17 | End: 2023-09-18

## 2022-10-17 RX ORDER — METOPROLOL SUCCINATE 25 MG/1
25 TABLET, EXTENDED RELEASE ORAL DAILY
Qty: 180 TABLET | Refills: 3 | Status: SHIPPED | OUTPATIENT
Start: 2022-10-17 | End: 2023-09-18

## 2022-10-17 RX ORDER — ATORVASTATIN CALCIUM 20 MG/1
20 TABLET, FILM COATED ORAL AT BEDTIME
Qty: 180 TABLET | Refills: 3 | Status: SHIPPED | OUTPATIENT
Start: 2022-10-17 | End: 2023-09-18

## 2022-10-17 ASSESSMENT — ASTHMA QUESTIONNAIRES: ACT_TOTALSCORE: 18

## 2022-10-17 ASSESSMENT — PAIN SCALES - GENERAL: PAINLEVEL: NO PAIN (0)

## 2022-10-17 ASSESSMENT — ENCOUNTER SYMPTOMS
BREAST MASS: 0
DYSURIA: 0
NAUSEA: 0
ABDOMINAL PAIN: 0
HEARTBURN: 0
CONSTIPATION: 0
HEMATOCHEZIA: 0
PARESTHESIAS: 0
FREQUENCY: 0
DIARRHEA: 0
PALPITATIONS: 0
SORE THROAT: 0
NERVOUS/ANXIOUS: 0
COUGH: 0
SHORTNESS OF BREATH: 0
HEMATURIA: 0
EYE PAIN: 0
JOINT SWELLING: 0
HEADACHES: 0
DIZZINESS: 0
MYALGIAS: 0
CHILLS: 0
FEVER: 0
WEAKNESS: 0
ARTHRALGIAS: 1

## 2022-10-17 NOTE — PROGRESS NOTES
Answers for HPI/ROS submitted by the patient on 10/17/2022  Frequency of exercise:: 2-3 days/week  Getting at least 3 servings of Calcium per day:: Yes  Diet:: Low fat/cholesterol  Taking medications regularly:: Yes  Medication side effects:: None  Bi-annual eye exam:: Yes  Dental care twice a year:: Yes  Sleep apnea or symptoms of sleep apnea:: None  abdominal pain: No  Blood in stool: No  Blood in urine: No  chest pain: No  chills: No  congestion: No  constipation: No  cough: No  diarrhea: No  dizziness: No  ear pain: No  eye pain: No  nervous/anxious: No  fever: No  frequency: No  genital sores: No  headaches: No  hearing loss: No  heartburn: No  arthralgias: Yes  joint swelling: No  peripheral edema: No  mood changes: No  myalgias: No  nausea: No  dysuria: No  palpitations: No  Skin sensation changes: No  sore throat: No  urgency: No  rash: No  shortness of breath: No  visual disturbance: No  weakness: No  pelvic pain: No  vaginal bleeding: No  vaginal discharge: No  tenderness: No  breast mass: No  breast discharge: No  Additional concerns today:: No  Duration of exercise:: 45-60 minutes      Lung Cancer Screening Shared Decision Making Visit     Charleen Huddleston, a 62 year old female, is eligible for lung cancer screening    History   Smoking Status     Every Day     Packs/day: 0.75     Years: 45.00     Types: Cigarettes     Last attempt to quit: 10/1/2012   Smokeless Tobacco     Never       I have discussed with patient the risks and benefits of screening for lung cancer with low-dose CT.     The risks include:    radiation exposure: one low dose chest CT has as much ionizing radiation as about 15 chest x-rays, or 6 months of background radiation living in [place]      false positives: most findings/nodules are NOT cancer, but some might still require additional diagnostic evaluation, including biopsy    over-diagnosis: some slow growing cancers that might never have been clinically significant will be  detected and treated unnecessarily     The benefit of early detection of lung cancer is contingent upon adherence to annual screening or more frequent follow up if indicated.     Furthermore, to benefit from screening, Charleen must be willing and able to undergo diagnostic procedures, if indicated. Although no specific guide is available for determining severity of comorbidities, it is reasonable to withhold screening in patients who have greater mortality risk from other diseases.     We did discuss that the best way to prevent lung cancer is to not smoke.    Some patients may value a numeric estimation of lung cancer risk when evaluating if lung cancer screening is right for them, here is one calculator:    ShouldIScreen

## 2022-10-17 NOTE — PROGRESS NOTES
SUBJECTIVE:   CC: Charleen is an 62 year old who presents for preventive health visit.       Patient has been advised of split billing requirements and indicates understanding: Yes  Patient is seen today for routine healthcare maintenance.  She has been followed by cardiology and gastroenterology.  Last cardiology appointment was in October 2022.  She is followed by gastroenterology for THOMAS every 6 months.  She has an appointment in the next few weeks with GI.  She is doing very well.  She is lost a significant amount of weight by discontinuing all alcohol use.  She is feeling very well.  She has been very active.  At the beginning of November she will be heading to the [facility] for the winter.  She is due for fasting labs and immunization update.  Blood pressure has been well controlled.  She is following a Mediterranean diet.  She is exercising daily.    Healthy Habits:     Getting at least 3 servings of Calcium per day:  Yes    Bi-annual eye exam:  Yes    Dental care twice a year:  Yes    Sleep apnea or symptoms of sleep apnea:  None    Diet:  Low fat/cholesterol    Frequency of exercise:  2-3 days/week    Duration of exercise:  45-60 minutes    Taking medications regularly:  Yes    Medication side effects:  None    PHQ-2 Total Score: 0    Additional concerns today:  No        Today's PHQ-2 Score:   PHQ-2 ( 1999 Pfizer) 10/17/2022   Q1: Little interest or pleasure in doing things 0   Q2: Feeling down, depressed or hopeless 0   PHQ-2 Score 0   PHQ-2 Total Score (12-17 Years)- Positive if 3 or more points; Administer PHQ-A if positive -   Q1: Little interest or pleasure in doing things Not at all   Q2: Feeling down, depressed or hopeless Not at all   PHQ-2 Score 0       Abuse: Current or Past (Physical, Sexual or Emotional) - No  Do you feel safe in your environment? Yes        Social History     Tobacco Use     Smoking status: Every Day     Packs/day: 0.75     Years: 45.00     Pack years: 33.75      Types: Cigarettes     Last attempt to quit: 10/1/2012     Years since quitting: 10.0     Smokeless tobacco: Never   Substance Use Topics     Alcohol use: Not Currently     Alcohol/week: 3.0 standard drinks     If you drink alcohol do you typically have >3 drinks per day or >7 drinks per week? No    Alcohol Use 10/17/2022   Prescreen: >3 drinks/day or >7 drinks/week? No   Prescreen: >3 drinks/day or >7 drinks/week? -       Reviewed orders with patient.  Reviewed health maintenance and updated orders accordingly - Yes      Breast Cancer Screening:  Any new diagnosis of family breast, ovarian, or bowel cancer? No    FHS-7:   Breast CA Risk Assessment (FHS-7) 10/6/2022   Did any of your first-degree relatives have breast or ovarian cancer? No   Did any of your relatives have bilateral breast cancer? No   Did any man in your family have breast cancer? No   Did any woman in your family have breast and ovarian cancer? No   Did any woman in your family have breast cancer before age 50 y? No   Do you have 2 or more relatives with breast and/or ovarian cancer? No   Do you have 2 or more relatives with breast and/or bowel cancer? No       Continue annual screening  Pertinent mammograms are reviewed under the imaging tab.    History of abnormal Pap smear: NO - age 30-65 PAP every 5 years with negative HPV co-testing recommended  PAP / HPV Latest Ref Rng & Units 3/31/2021   PAP (Historical) - NIL   HPV16 NEG:Negative Negative   HPV18 NEG:Negative Negative   HRHPV NEG:Negative Negative     Reviewed and updated as needed this visit by clinical staff   Tobacco  Allergies  Meds  Problems  Med Hx  Surg Hx  Fam Hx  Soc   Hx        Reviewed and updated as needed this visit by Provider   Tobacco  Allergies  Meds  Problems  Med Hx  Surg Hx  Fam Hx         Past Medical History:   Diagnosis Date     Bacterial pneumonia 02/02/2010     Bitten by dog 09/2012    hospitalized     Chronic sinusitis     Chronic Sinusitis      Nonrheumatic mitral valve insufficiency 02/2005    Trace Mitral Regurgitation, Echo.  Does not require SBE     Other shoulder lesions, left shoulder     L shoulder RTC tendonitis     Personal history of other medical treatment (CODE)     prophylaxis     Personal history of other medical treatment (CODE) 03/11/2005    Echocardiogram 3/11/05 for family history of mitral valve and aortic valve replacement.  Trace mitral regurgitation.  Mitral valve appeared normal.  Tricuspid aortic valve without AI or AS.  No focal wall motion abnormalities.  Ejection fraction 72% 3/12/05.     Primary osteoarthritis of right knee     No Comments Provided     Tobacco use     quit 9/2012      Past Surgical History:   Procedure Laterality Date     ARTHROSCOPY KNEE Right 08/2001    Dr. 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     ARTHROSCOPY SHOULDER  2012    2011 / 2012,Left, supraspinatus repair     COLONOSCOPY  01/10/2013    hyperplastic, 1/10/23     ECHOCARDIOGRAM INTRAOPERATIVE IN OR  03/11/2005    for family history of mitral valve and aortic valve replacement.  Trace mitral regurgitation.  Mitral valve appeared normal.  Tricuspid aortic valve without AI or AS.  No focal wall motion abnormalities.  Ejection fraction 72%     OTHER SURGICAL HISTORY  03/13/2006    PREMALIG/BENIGN SKIN LESION EXCISION,Excision of a benign verrucose keratosis with actinic damage and lentiginous change.     OTHER SURGICAL HISTORY  12/2006    ND SODIUM HYALONURATE PER INJECTION,Synvisc injection     RELEASE TRIGGER FINGER N/A 5/27/2022    Procedure: Right Long (Middle) Trigger Finger Release;  Surgeon: Flako Reno MD;  Location: [facility] OR     TONSILLECTOMY      No Comments Provided     TOTAL KNEE ARTHROPLASTY Right        Review of Systems   Constitutional: Negative for chills and fever.   HENT: Negative for congestion, ear pain, hearing loss and sore throat.    Eyes: Negative for pain and visual disturbance.   Respiratory:  "Negative for cough and shortness of breath.    Cardiovascular: Negative for chest pain, palpitations and peripheral edema.   Gastrointestinal: Negative for abdominal pain, constipation, diarrhea, heartburn, hematochezia and nausea.   Breasts:  Negative for tenderness, breast mass and discharge.   Genitourinary: Negative for dysuria, frequency, genital sores, hematuria, pelvic pain, urgency, vaginal bleeding and vaginal discharge.   Musculoskeletal: Positive for arthralgias. Negative for joint swelling and myalgias.   Skin: Negative for rash.   Neurological: Negative for dizziness, weakness, headaches and paresthesias.   Psychiatric/Behavioral: Negative for mood changes. The patient is not nervous/anxious.           OBJECTIVE:   /72   Pulse 66   Temp 97  F (36.1  C) (Tympanic)   Resp 16   Ht 1.6 m (5' 3\")   Wt 81.6 kg (180 lb)   LMP  (LMP Unknown)   SpO2 98%   BMI 31.89 kg/m    Physical Exam  Pleasant female no acute distress.  Affect normal.  Alert and oriented x4.  Sclera nonicteric.  Conjunctiva noninflamed.  TMs clear.  Patient wearing facial mask secondary to pandemic but denies oral mucosal concerns.  Neck supple.  No adenopathy or thyromegaly.  No carotid bruits.  Lung fields clear to auscultation throughout.  Cardiovascular regular rate and rhythm with no S3.  Abdomen is soft and without masses, tenderness and organomegaly.  No abdominal bruits or pulsatile masses.  No axillary or inguinal adenopathy.  Extremities without edema.  DP PT 2+.  Gait stable.  Hands with osteoarthritic changes but no warmth redness or significant swelling.        ASSESSMENT/PLAN:       ICD-10-CM    1. Health care maintenance  Z00.00       2. Lupus (H)  M32.9       3. Mild intermittent asthma without complication  J45.20 fluticasone-salmeterol (ADVAIR DISKUS) 250-50 MCG/ACT inhaler      4. Pulmonary nodules  R91.8       5. NAFLD (nonalcoholic fatty liver disease)  K76.0       6. Gastroesophageal reflux disease without " esophagitis  K21.9 omeprazole (PRILOSEC) 20 MG DR capsule      7. Mixed hyperlipidemia  E78.2 Lipid Profile     Comprehensive metabolic panel     atorvastatin (LIPITOR) 20 MG tablet      8. Hyperglycemia  R73.9 Hemoglobin A1c     Comprehensive metabolic panel      9. Primary hypertension  I10 Comprehensive metabolic panel      10. Need for pneumococcal vaccination  Z23 PNEUMOCOCCAL 23 VALENT      11. Personal history of tobacco use  Z87.891 Prof fee: Shared Decision Making for Lung Cancer Screening     CT Chest Lung Cancer Scrn Low Dose wo     SMOKING CESSATION COUNSELING 3-10 MIN      12. Need for tetanus, diphtheria, and acellular pertussis (Tdap) vaccine  Z23 TDAP VACCINE (Adacel, Boostrix)  [[id number]]      13. Essential hypertension  I10 metoprolol succinate ER (TOPROL XL) 25 MG 24 hr tablet     losartan-hydrochlorothiazide (HYZAAR) 50-12.5 MG tablet      14. Tachycardia  R00.0 metoprolol succinate ER (TOPROL XL) 25 MG 24 hr tablet        Plan:  Fasting labs today include lipids, chemistry panel and A1c.  She has had impaired fasting glucose in the past with normal A1c.  She is congratulated on her lifestyle modifications.  Her weight is down.  She stopped drinking alcohol.  Medication refills are provided.  No changes to medication at this time.  We discussed tobacco cessation and scheduled her for lung CT for screening.  She is not interested in tobacco cessation at this time.  She does have nicotine patches.  Discussed other options to assist with tobacco cessation.  She will let me know when she is ready and I will be happy to help with cessation medications.  She has not tolerated Zyban in the past.  Tdap and Pneumovax 23 provided.  Up-to-date on other immunizations.  Continue to follow with cardiology and gastroenterology.  She is going to wait until next year to have bone density scan.  She will be due for colon cancer screening next year.    Patient has been advised of split billing requirements and  "indicates understanding: No    COUNSELING:  Reviewed preventive health counseling, as reflected in patient instructions       Regular exercise       Healthy diet/nutrition       Alcohol Use       Osteoporosis prevention/bone health       Colorectal Cancer Screening       Consider lung cancer screening for ages 55-80 years (77 for Medicare) and 20 pack-year smoking history        Advance Care Planning    Estimated body mass index is 31.89 kg/m  as calculated from the following:    Height as of this encounter: 1.6 m (5' 3\").    Weight as of this encounter: 81.6 kg (180 lb).    Weight management plan: Discussed healthy diet and exercise guidelines    She reports that she has been smoking cigarettes. She has a 33.75 pack-year smoking history. She has never used smokeless tobacco.  Nicotine/Tobacco Cessation Plan:   Information offered: Patient not interested at this time      Counseling Resources:  ATP IV Guidelines  Pooled Cohorts Equation Calculator  Breast Cancer Risk Calculator  BRCA-Related Cancer Risk Assessment: FHS-7 Tool  FRAX Risk Assessment  ICSI Preventive Guidelines  Dietary Guidelines for Americans, 2010  USDA's MyPlate  ASA Prophylaxis  Lung CA Screening    Elisa Doherty NP  [facility]  "

## 2022-10-17 NOTE — PATIENT INSTRUCTIONS
Lung Cancer Screening   Frequently Asked Questions  If you are at high-risk for lung cancer, getting screened with low-dose computed tomography (LDCT) every year can help save your life. This handout offers answers to some of the most common questions about lung cancer screening. If you have other questions, please call [phone] ([phone]).     What is it?  Lung cancer screening uses special X-ray technology to create an image of your lung tissue. The exam is quick and easy and takes less than 10 seconds. We don t give you any medicine or use any needles. You can eat before and after the exam. You don t need to change your clothes as long as the clothing on your chest doesn t contain metal. But, you do need to be able to hold your breath for at least 6 seconds during the exam.    What is the goal of lung cancer screening?  The goal of lung cancer screening is to save lives. Many times, lung cancer is not found until a person starts having physical symptoms. Lung cancer screening can help detect lung cancer in the earliest stages when it may be easier to treat.    Who should be screened for lung cancer?  We suggest lung cancer screening for anyone who is at high-risk for lung cancer. You are in the high-risk group if you:      are between the ages of 55 and 79, and    have smoked at least 1 pack of cigarettes a day for 20 or more years, and    still smoke or have quit within the past 15 years.    However, if you have a new cough or shortness of breath, you should talk to your doctor before being screened.    Why does it matter if I have symptoms?  Certain symptoms can be a sign that you have a condition in your lungs that should be checked and treated by your doctor. These symptoms include fever, chest pain, a new or changing cough, shortness of breath that you have never felt before, coughing up blood or unexplained weight loss. Having any of these symptoms can greatly affect the results of lung  cancer screening.       Should all smokers get an LDCT lung cancer screening exam?  It depends. Lung cancer screening is for a very specific group of men and women who have a history of heavy smoking over a long period of time (see  Who should be screened for lung cancer  above).  I am in the high-risk group, but have been diagnosed with cancer in the past. Is LDCT lung cancer screening right for me?  In some cases, you should not have LDCT lung screening, such as when your doctor is already following your cancer with CT scan studies. Your doctor will help you decide if LDCT lung screening is right for you.  Do I need to have a screening exam every year?  Yes. If you are in the high-risk group described earlier, you should get an LDCT lung cancer screening exam every year until you are 79, or are no longer willing or able to undergo screening and possible procedures to diagnose and treat lung cancer.  How effective is LDCT at preventing death from lung cancer?  Studies have shown that LDCT lung cancer screening can lower the risk of death from lung cancer by 20 percent in people who are at high-risk.  What are the risks?  There are some risks and limitations of LDCT lung cancer screening. We want to make sure you understand the risks and benefits, so please let us know if you have any questions. Your doctor may want to talk with you more about these risks.    Radiation exposure: As with any exam that uses radiation, there is a very small increased risk of cancer. The amount of radiation in LDCT is small--about the same amount a person would get from a mammogram. Your doctor orders the exam when he or she feels the potential benefits outweigh the risks.    False negatives: No test is perfect, including LDCT. It is possible that you may have a medical condition, including lung cancer, that is not found during your exam. This is called a false negative result.    False positives and more testing: LDCT very often finds  something in the lung that could be cancer, but in fact is not. This is called a false positive result. False positive tests often cause anxiety. To make sure these findings are not cancer, you may need to have more tests. These tests will be done only if you give us permission. Sometimes patients need a treatment that can have side effects, such as a biopsy. For more information on false positives, see  What can I expect from the results?     Findings not related to lung cancer: Your LDCT exam also takes pictures of areas of your body next to your lungs. In a very small number of cases, the CT scan will show an abnormal finding in one of these areas, such as your kidneys, adrenal glands, liver or thyroid. This finding may not be serious, but you may need more tests. Your doctor can help you decide what other tests you may need, if any.  What can I expect from the results?  About 1 out of 4 LDCT exams will find something that may need more tests. Most of the time, these findings are lung nodules. Lung nodules are very small collections of tissue in the lung. These nodules are very common, and the vast majority--more than 97 percent--are not cancer (benign). Most are normal lymph nodes or small areas of scarring from past infections.  But, if a small lung nodule is found to be cancer, the cancer can be cured more than 90 percent of the time. To know if the nodule is cancer, we may need to get more images before your next yearly screening exam. If the nodule has suspicious features (for example, it is large, has an odd shape or grows over time), we will refer you to a specialist for further testing.  Will my doctor also get the results?  Yes. Your doctor will get a copy of your results.  Is it okay to keep smoking now that there s a cancer screening exam?  No. Tobacco is one of the strongest cancer-causing agents. It causes not only lung cancer, but other cancers and cardiovascular (heart) diseases as well. The damage  caused by smoking builds over time. This means that the longer you smoke, the higher your risk of disease. While it is never too late to quit, the sooner you quit, the better.  Where can I find help to quit smoking?  The best way to prevent lung cancer is to stop smoking. If you have already quit smoking, congratulations and keep it up! For help on quitting smoking, please call [employer] at 9-579-QUITNOW (1-396.329.7134) or the American Cancer Society at 1-116.393.7375 to find local resources near you.  One-on-one health coaching:  If you d prefer to work individually with a health care provider on tobacco cessation, we offer:      Medication Therapy Management:  Our specially trained pharmacists work closely with you and your doctor to help you quit smoking.  Call [phone] or [phone] (toll free).

## 2022-10-19 DIAGNOSIS — K21.9 GASTROESOPHAGEAL REFLUX DISEASE WITHOUT ESOPHAGITIS: ICD-10-CM

## 2022-10-24 ENCOUNTER — TELEPHONE (OUTPATIENT)
Dept: CARDIOLOGY | Facility: OTHER | Age: 62
End: 2022-10-24

## 2022-10-24 NOTE — TELEPHONE ENCOUNTER
"Fax received from [facility] #[phone] in regards to omeprazole and it states: \"How many a day? Need directions\"    I took note that Elisa ZARATE-YONI refilled med since then to [facility] with directions.  Patient was contacted and states that she would like to pick this up at [facility].  [facility] notified to call [facility] to get this prilosec script transferred.  Phoebe Longoria RN......October 24, 2022...4:27 PM   "

## 2022-10-27 ENCOUNTER — HOSPITAL ENCOUNTER (OUTPATIENT)
Dept: CT IMAGING | Facility: OTHER | Age: 62
Discharge: HOME | End: 2022-10-27
Attending: NURSE PRACTITIONER | Admitting: NURSE PRACTITIONER
Payer: COMMERCIAL

## 2022-10-27 DIAGNOSIS — Z87.891 PERSONAL HISTORY OF TOBACCO USE: ICD-10-CM

## 2022-10-27 PROCEDURE — 71271 CT THORAX LUNG CANCER SCR C-: CPT

## 2022-11-01 RX ORDER — METHYLPREDNISOLONE ACETATE 40 MG/ML
40 INJECTION, SUSPENSION INTRA-ARTICULAR; INTRALESIONAL; INTRAMUSCULAR; SOFT TISSUE ONCE
Status: CANCELLED | OUTPATIENT
Start: 2022-11-04

## 2022-11-04 ENCOUNTER — OFFICE VISIT (OUTPATIENT)
Dept: ORTHOPEDICS | Facility: OTHER | Age: 62
End: 2022-11-04
Attending: SPECIALIST
Payer: COMMERCIAL

## 2022-11-04 DIAGNOSIS — M18.0 PRIMARY OSTEOARTHRITIS OF BOTH FIRST CARPOMETACARPAL JOINTS: Primary | ICD-10-CM

## 2022-11-04 PROCEDURE — 250N000011 HC RX IP 250 OP 636: Performed by: SPECIALIST

## 2022-11-04 PROCEDURE — 20600 DRAIN/INJ JOINT/BURSA W/O US: CPT | Mod: LT | Performed by: SPECIALIST

## 2022-11-04 PROCEDURE — 250N000009 HC RX 250: Performed by: SPECIALIST

## 2022-11-04 RX ORDER — METHYLPREDNISOLONE ACETATE 40 MG/ML
40 INJECTION, SUSPENSION INTRA-ARTICULAR; INTRALESIONAL; INTRAMUSCULAR; SOFT TISSUE ONCE
Status: COMPLETED | OUTPATIENT
Start: 2022-11-04 | End: 2022-11-04

## 2022-11-04 RX ORDER — BUPIVACAINE HYDROCHLORIDE 5 MG/ML
1 INJECTION, SOLUTION EPIDURAL; INTRACAUDAL ONCE
Status: COMPLETED | OUTPATIENT
Start: 2022-11-04 | End: 2022-11-04

## 2022-11-04 RX ADMIN — METHYLPREDNISOLONE ACETATE 40 MG: 40 INJECTION, SUSPENSION INTRA-ARTICULAR; INTRALESIONAL; INTRAMUSCULAR; INTRASYNOVIAL; SOFT TISSUE at 08:32

## 2022-11-04 RX ADMIN — BUPIVACAINE HYDROCHLORIDE 5 MG: 5 INJECTION, SOLUTION EPIDURAL; INTRACAUDAL; PERINEURAL at 08:32

## 2022-11-04 NOTE — PROGRESS NOTES
Visit Date: 2022    Arabella returns for followup.  We have injected bilateral basilar joints in the past.  She would like to proceed with an injection of the more symptomatic left side today.    PHYSICAL EXAMINATION:  Reveals a 62-year-old female.  She is alert and oriented x3, and appropriate.  Gait and station are appropriate.  She is well groomed and well kempt.  Examination of both upper extremities shows full and symmetric range of motion of elbows, wrists.  Examination of both thumbs reveals basilar joint prominence of the left side is more tender than the right.    IMAGING:  X-rays were reviewed previously show basilar joint arthrosis.    IMPRESSION:  Left thumb basal joint arthrosis.    PLAN:  The patient underwent injection.  Sterile prep was performed, and left basilar joint was injected with 40 mg of Depo-Medrol and 1 mL of 0.5% Marcaine with out epinephrine.  There were no complications.    PLAN:  Our plan will be to follow her clinically with repeat examination as symptoms warrant it.    Flako Reno MD        D: 2022   T: 2022   MT: EVIN    Name:     ARABELLA REYESMukul  MRN:      [id number]        Account:    [id number]   :      1960           Visit Date: 2022     Document: [id number]

## 2023-02-28 ENCOUNTER — TELEPHONE (OUTPATIENT)
Dept: CARDIOLOGY | Facility: OTHER | Age: 63
End: 2023-02-28
Payer: COMMERCIAL

## 2023-02-28 NOTE — TELEPHONE ENCOUNTER
[facility]-Reason for call: Medication or medication refill    Name of medication requested: Metoprolol     Are you out of the medication? yes    What pharmacy do you use? [facility]    Preferred method for responding to this message: Telephone Call    Phone number patient can be reached at: Home number on file [phone]    If we cannot reach you directly, may we leave a detailed response at the number you provided? Yes

## 2023-03-01 NOTE — TELEPHONE ENCOUNTER
Patient is in [place] and has a week supply on hand. Informed her of Rx on file at [facility]:   180 tablet 3 10/17/2022     Patient will contact her local pharmacy in [place] and request a refill transfer. Jenna Edwards RN on 3/1/2023 at 10:24 AM

## 2023-04-19 DIAGNOSIS — Z12.11 ENCOUNTER FOR SCREENING COLONOSCOPY: Primary | ICD-10-CM

## 2023-04-19 NOTE — TELEPHONE ENCOUNTER
Screening Questions for the Scheduling of Screening Colonoscopies   (If Colonoscopy is diagnostic, Provider should review the chart before scheduling.)  Are you younger than 50 or older than 80?  NO  Do you take aspirin or fish oil?  NO (if yes, tell patient to stop 1 week prior to Colonoscopy)  Do you take warfarin (Coumadin), clopidogrel (Plavix), apixaban (Eliquis), dabigatram (Pradaxa), rivaroxaban (Xarelto) or any blood thinner? NO  Do you use oxygen at home?  NO  Do you have kidney disease? NO  Are you on dialysis? NO  Have you had a stroke or heart attack in the last year? NO  Have you had a stent in your heart or any blood vessel in the last year? NO  Have you had a transplant of any organ? NO  Have you had a colonoscopy or upper endoscopy (EGD) before? YES         When?  2013  Date of scheduled Colonoscopy. 07/11/2023  Provider [facility]  [facility]armacy [facility]

## 2023-04-26 RX ORDER — POLYETHYLENE GLYCOL 3350, SODIUM CHLORIDE, SODIUM BICARBONATE, POTASSIUM CHLORIDE 420; 11.2; 5.72; 1.48 G/4L; G/4L; G/4L; G/4L
4000 POWDER, FOR SOLUTION ORAL ONCE
Qty: 4000 ML | Refills: 0 | Status: SHIPPED | OUTPATIENT
Start: 2023-07-04 | End: 2023-07-04

## 2023-04-26 RX ORDER — BISACODYL 5 MG/1
TABLET, DELAYED RELEASE ORAL
Qty: 2 TABLET | Refills: 0 | Status: ON HOLD | OUTPATIENT
Start: 2023-07-04 | End: 2023-07-11

## 2023-07-11 ENCOUNTER — HOSPITAL ENCOUNTER (OUTPATIENT)
Facility: OTHER | Age: 63
Discharge: HOME | End: 2023-07-11
Attending: SURGERY | Admitting: SURGERY
Payer: COMMERCIAL

## 2023-07-11 ENCOUNTER — ANESTHESIA (OUTPATIENT)
Dept: SURGERY | Facility: OTHER | Age: 63
End: 2023-07-11
Payer: COMMERCIAL

## 2023-07-11 ENCOUNTER — ANESTHESIA EVENT (OUTPATIENT)
Dept: SURGERY | Facility: OTHER | Age: 63
End: 2023-07-11
Payer: COMMERCIAL

## 2023-07-11 VITALS
HEART RATE: 68 BPM | TEMPERATURE: 97 F | BODY MASS INDEX: 33.49 KG/M2 | SYSTOLIC BLOOD PRESSURE: 125 MMHG | OXYGEN SATURATION: 98 % | RESPIRATION RATE: 16 BRPM | DIASTOLIC BLOOD PRESSURE: 70 MMHG | WEIGHT: 182 LBS | HEIGHT: 62 IN

## 2023-07-11 PROCEDURE — 45385 COLONOSCOPY W/LESION REMOVAL: CPT | Mod: PT | Performed by: SURGERY

## 2023-07-11 PROCEDURE — 250N000011 HC RX IP 250 OP 636: Performed by: NURSE ANESTHETIST, CERTIFIED REGISTERED

## 2023-07-11 PROCEDURE — 999N000010 HC STATISTIC ANES STAT CODE-CRNA PER MINUTE: Performed by: SURGERY

## 2023-07-11 PROCEDURE — 45380 COLONOSCOPY AND BIOPSY: CPT | Performed by: SURGERY

## 2023-07-11 PROCEDURE — 250N000009 HC RX 250: Performed by: NURSE ANESTHETIST, CERTIFIED REGISTERED

## 2023-07-11 PROCEDURE — 45385 COLONOSCOPY W/LESION REMOVAL: CPT | Performed by: NURSE ANESTHETIST, CERTIFIED REGISTERED

## 2023-07-11 PROCEDURE — 88305 TISSUE EXAM BY PATHOLOGIST: CPT

## 2023-07-11 PROCEDURE — 258N000003 HC RX IP 258 OP 636: Performed by: SURGERY

## 2023-07-11 RX ORDER — SODIUM CHLORIDE, SODIUM LACTATE, POTASSIUM CHLORIDE, CALCIUM CHLORIDE 600; 310; 30; 20 MG/100ML; MG/100ML; MG/100ML; MG/100ML
INJECTION, SOLUTION INTRAVENOUS CONTINUOUS
Status: DISCONTINUED | OUTPATIENT
Start: 2023-07-11 | End: 2023-07-11 | Stop reason: HOSPADM

## 2023-07-11 RX ORDER — LIDOCAINE HYDROCHLORIDE 20 MG/ML
INJECTION, SOLUTION INFILTRATION; PERINEURAL PRN
Status: DISCONTINUED | OUTPATIENT
Start: 2023-07-11 | End: 2023-07-11

## 2023-07-11 RX ORDER — LIDOCAINE 40 MG/G
CREAM TOPICAL
Status: DISCONTINUED | OUTPATIENT
Start: 2023-07-11 | End: 2023-07-11 | Stop reason: HOSPADM

## 2023-07-11 RX ORDER — PROPOFOL 10 MG/ML
INJECTION, EMULSION INTRAVENOUS PRN
Status: DISCONTINUED | OUTPATIENT
Start: 2023-07-11 | End: 2023-07-11

## 2023-07-11 RX ADMIN — LIDOCAINE HYDROCHLORIDE 100 MG: 20 INJECTION, SOLUTION INFILTRATION; PERINEURAL at 10:45

## 2023-07-11 RX ADMIN — PROPOFOL 50 MG: 10 INJECTION, EMULSION INTRAVENOUS at 10:47

## 2023-07-11 RX ADMIN — PROPOFOL 200 MCG/KG/MIN: 10 INJECTION, EMULSION INTRAVENOUS at 10:48

## 2023-07-11 RX ADMIN — SODIUM CHLORIDE, POTASSIUM CHLORIDE, SODIUM LACTATE AND CALCIUM CHLORIDE 30 ML/HR: 600; 310; 30; 20 INJECTION, SOLUTION INTRAVENOUS at 08:36

## 2023-07-11 RX ADMIN — PROPOFOL 100 MG: 10 INJECTION, EMULSION INTRAVENOUS at 10:46

## 2023-07-11 RX ADMIN — PROPOFOL 50 MG: 10 INJECTION, EMULSION INTRAVENOUS at 10:45

## 2023-07-11 ASSESSMENT — ACTIVITIES OF DAILY LIVING (ADL)
ADLS_ACUITY_SCORE: 35

## 2023-07-11 ASSESSMENT — LIFESTYLE VARIABLES: TOBACCO_USE: 1

## 2023-07-11 NOTE — DISCHARGE INSTRUCTIONS
[facility]  Adult Discharge Orders & Instructions    ________________________________________________________________          For 12 hours after surgery  Get plenty of rest.  A responsible adult must stay with you for at least 12 hours after you leave the hospital.   You may feel lightheaded.  IF so, sit for a few minutes before standing.  Have someone help you get up.   You may have a slight fever. Call the doctor if your fever is over 101 F (38.3 C) (taken under the tongue) or lasts longer than 24 hours.  You may have a dry mouth, a sore throat, muscle aches or trouble sleeping.  These should go away after 24 hours.  Do not make important or legal decisions.  6.   Do not drive or use heavy equipment.  If you have weakness or tingling, don't drive or use heavy equipment until this feeling goes away.    To contact a doctor, call   [phone]_______________________     Your doctor recommends that you eat 25 to 30 Grams of fiber daily. The following are some examples of fiber amounts in different foods.    Fruits: Apple (with skin) 1 medium = 4.4 Grams   Banana      1 medium = 3.1 Grams   Oranges     1 orange = 3.1 Grams   Prunes     1 cup, pitted = 12.4 Grams    Juices: Apple, unsweetened w/ added ascorbic acid  1 cup = 0.5 Grams    Grapefruit, white, canned,sweetened  1 cup = 0.2 Grams    Grape, unsweetened w/ added ascorbic acid  1 cup = 0.5 Grams    Orange     1 cup = 0.7 Grams    Vegetables:   Cooked: Green Beans   1 cup = 4.0 Grams       Carrots   1/2 cup sliced = 2.3 Grams       Peas       1 cup = 8.8 Grams       Potato (baked, with skin)  1 medium = 3.8 Grams    Raw: Cucumber (with peel)  1 cucumber = 1.5 Grams            Lettuce     1 cup shredded = 0.5 Grams            Tomato   1 medium tomato = 1.5 Grams            Spinach  1 cup = 0.7 Grams    Legumes: Baked beans, canned, no salt added  1 cup = 13.9 Grams         Kidney Beans, canned  1 cup = 13.6 Grams         Puga Beans, canned     1  cup = 11.6 Grams         Lentils, boiled   1 cup = 15.6 Grams    Breads, Pastas, Flours: Bran muffins   1 medium muffin = 5.2 Grams           Oatmeal, cooked  1 cup = 4.0 Grams           White Bread   1 slice = 0.6 Grams           Whole- wheat bread = 1.9 Grams    Pasta and rice, cooked: Macaroni  1 cup = 2.5 Grams           Rice, Brown  1 cup = 3.5 Grams           Rice, white   1 cup = 0.6 Grams           Spaghetti (regular) 1 cup = 2.5 Grams    Nuts: Almonds   1 cup = 17.4 Grams            Peanuts    1 cup = 12.4 Grams

## 2023-07-11 NOTE — H&P
GENERAL SURGERY CONSULTATION NOTE    Charleen Huddleston   [address]  [place] [address]  63 year old  female    Primary Care Provider:  Elisa Doherty      HPI: Charleen Huddleston presents to day surgery in need of screening colonoscopy.   Charleen Huddleston denies family history of colon cancer. Patient denies change in bowel habits or blood in stools. Previous colonoscopy was 2013, normal.     REVIEW OF SYSTEMS:    GENERAL: No fevers or chills. Denies fatigue, recent weight loss.  HEENT: No sinus drainage. No changes with vision or hearing. No difficulty swallowing.   LYMPHATICS:  Noswollen nodes in axilla, neck or groin.  CARDIOVASCULAR: Denies chest pain, palpitations and dyspnea on exertion.  PULMONARY: No shortness of breath or cough. No increase in sputum production.  GI: Denies melena,bright red blood in stools. No hematemesis. No constipation or diarrhea.  : No dysuria or hematuria.  SKIN: No recent rashes or ulcers.   HEMATOLOGY:  No history of easy bruising or bleeding.  ENDOCRINE:  No history of diabetes or thyroid problems.  NEUROLOGY:  No history of seizures or headaches. No motor or sensory changes.        Patient Active Problem List   Diagnosis     Elevated LFTs     HTN (hypertension)     Lupus (H)     Nicotine use disorder     Obesity     Disorder of bursae and tendons in shoulder region     Other affections of shoulder region, not elsewhere classified     Mixed hyperlipidemia     Hyperglycemia     Pulmonary nodules     NAFLD (nonalcoholic fatty liver disease)     Prediabetes     Gastroesophageal reflux disease without esophagitis     Mild intermittent asthma without complication     Allodynia     Other complicated headache syndrome       Past Medical History:   Diagnosis Date     Bacterial pneumonia 02/02/2010     Bitten by dog 09/2012    hospitalized     Chronic sinusitis     Chronic Sinusitis     Nonrheumatic mitral valve insufficiency 02/2005    Trace Mitral Regurgitation, Echo.   Does not require SBE     Other shoulder lesions, left shoulder     L shoulder RTC tendonitis     Personal history of other medical treatment (CODE)     prophylaxis     Personal history of other medical treatment (CODE) 03/11/2005    Echocardiogram 3/11/05 for family history of mitral valve and aortic valve replacement.  Trace mitral regurgitation.  Mitral valve appeared normal.  Tricuspid aortic valve without AI or AS.  No focal wall motion abnormalities.  Ejection fraction 72% 3/12/05.     Primary osteoarthritis of right knee     No Comments Provided     Tobacco use     quit 9/2012       Past Surgical History:   Procedure Laterality Date     ARTHROSCOPY KNEE Right 08/2001    Dr. 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     ARTHROSCOPY SHOULDER  2012    2011 / 2012,Left, supraspinatus repair     COLONOSCOPY  01/10/2013    hyperplastic, 1/10/23     ECHOCARDIOGRAM INTRAOPERATIVE IN OR  03/11/2005    for family history of mitral valve and aortic valve replacement.  Trace mitral regurgitation.  Mitral valve appeared normal.  Tricuspid aortic valve without AI or AS.  No focal wall motion abnormalities.  Ejection fraction 72%     OTHER SURGICAL HISTORY  03/13/2006    PREMALIG/BENIGN SKIN LESION EXCISION,Excision of a benign verrucose keratosis with actinic damage and lentiginous change.     OTHER SURGICAL HISTORY  12/2006    OR SODIUM HYALONURATE PER INJECTION,Synvisc injection     RELEASE TRIGGER FINGER N/A 5/27/2022    Procedure: Right Long (Middle) Trigger Finger Release;  Surgeon: Flako Reno MD;  Location: [facility]     TONSILLECTOMY      No Comments Provided     TOTAL KNEE ARTHROPLASTY Right        Family History   Problem Relation Age of Onset     Hyperlipidemia Mother         Hyperlipidemia,Hyperlipidemia     Other - See Comments Mother         Stroke,CVA 03/07     Heart Disease Mother         Heart Disease,aortic and mitral valve replacements, rheumatic fever     Mitral Valve  Replacement Mother      Diabetes Father         Diabetes     Mitral Valve Replacement Father      Rheumatic fever Father      Diabetes Sister      Polycystic ovary syndrome Child      Thyroid Disease Child      Heart Disease Sister         Heart Disease,Valvular heart disease and diffuse athersclerosis on brain MRI     Other - See Comments Sister         rheumatic fever     Rheumatic fever Sister      Cerebrovascular Disease Sister      Other - See Comments Maternal Grandmother         Stroke,CVA     Prostate Cancer Paternal Grandfather         Cancer-prostate     Hypertension Sister      Hyperlipidemia Sister        Social History     Social History Narrative    .  Works at the school district.   works at [employer].      She has 3 grown children.    [place] [address] [address]. Quit smoking 9/2012       Social History     Socioeconomic History     Marital status:      Spouse name: Not on file     Number of children: Not on file     Years of education: Not on file     Highest education level: Not on file   Occupational History     Not on file   Tobacco Use     Smoking status: Every Day     Packs/day: 0.75     Years: 45.00     Pack years: 33.75     Types: Cigarettes     Last attempt to quit: 10/1/2012     Years since quitting: 10.7     Smokeless tobacco: Never   Vaping Use     Vaping Use: Never used   Substance and Sexual Activity     Alcohol use: Not Currently     Alcohol/week: 3.0 standard drinks of alcohol     Drug use: No     Sexual activity: Yes     Partners: Male   Other Topics Concern     Not on file   Social History Narrative    .  Works at the school district.   works at [employer].      She has 3 grown children.    Lives [place] [address]. Quit smoking 9/2012     Social Determinants of Health     Financial Resource Strain: Not on file   Food Insecurity: Not on file   Transportation Needs: Not on file   Physical Activity: Not on file   Stress: Not on file   Social Connections: Not on  "file   Intimate Partner Violence: Not on file   Housing Stability: Not on file       No current facility-administered medications on file prior to encounter.  albuterol (PROAIR HFA/PROVENTIL HFA/VENTOLIN HFA) 108 (90 Base) MCG/ACT inhaler, INHALE 2 PUFFS BY MOUTH DAILY AS NEEDED  atorvastatin (LIPITOR) 20 MG tablet, Take 1 tablet (20 mg) by mouth At Bedtime  clobetasol (TEMOVATE) 0.05 % external ointment,   fluticasone-salmeterol (ADVAIR DISKUS) 250-50 MCG/ACT inhaler, TWICE DAILY Strength: 250-50 MCG/DOSE (Patient taking differently: 2 times daily as needed TWICE DAILY Strength: 250-50 MCG/DOSE)  losartan-hydrochlorothiazide (HYZAAR) 50-12.5 MG tablet, Take 1 tablet by mouth daily  metoprolol succinate ER (TOPROL XL) 25 MG 24 hr tablet, Take 1 tablet (25 mg) by mouth daily  omeprazole (PRILOSEC) 20 MG DR capsule, I capsule daily          ALLERGIES/SENSITIVITIES:   Allergies   Allergen Reactions     Lisinopril      cough       PHYSICAL EXAM:     /65   Pulse 55   Temp 97.2  F (36.2  C) (Tympanic)   Resp 12   Ht 1.575 m (5' 2\")   Wt 82.6 kg (182 lb)   LMP  (LMP Unknown)   SpO2 96%   BMI 33.29 kg/m      General Appearance:   Sitting up in bed, no apparent distress  HEENT: Pupils are equal and reactive, no scleral icterus   Heart & CV:  RRR, no murmur.  LUNGS: No increased work of breathing. Lungs are CTA B/L, no wheezing or crackles.  Abd:  soft, non-tender, no masses   Ext: no lower extremity edema   Neuro: alert and oriented, normal speech and mentation         CONSULTATION ASSESSMENT AND PLAN:    63 year old female with average risk for colon cancer.      The technical details of colonoscopy were discussed with the patient along with the risks and benefits to include bleeding, perforation and incomplete study. Charleen Huddleston demonstrated understanding and is willing to proceed.       Yemi Rojas MD on 7/11/2023 at 9:44 AM      "

## 2023-07-11 NOTE — ANESTHESIA CARE TRANSFER NOTE
Patient: Charleen Huddleston    Procedure: Procedure(s):  COLONOSCOPY, WITH POLYPECTOMY        Diagnosis: Encounter for screening colonoscopy [Z12.11]  Diagnosis Additional Information: No value filed.    Anesthesia Type:   MAC     Note:    Oropharynx: oropharynx clear of all foreign objects  Level of Consciousness: awake  Oxygen Supplementation: room air    Independent Airway: airway patency not satisfactory and stable  Dentition: dentition unchanged  Vital Signs Stable: post-procedure vital signs reviewed and stable  Report to RN Given: handoff report given            Vitals:  Vitals Value Taken Time   /70 07/11/23 1130   Temp 97  F (36.1  C) 07/11/23 1112   Pulse 68 07/11/23 1130   Resp 16 07/11/23 1112   SpO2 97 % 07/11/23 1139   Vitals shown include unvalidated device data.    Electronically Signed By: SMITHA FRYE CRNA  July 11, 2023  11:46 AM

## 2023-07-11 NOTE — ANESTHESIA PREPROCEDURE EVALUATION
Anesthesia Pre-Procedure Evaluation    Patient: Charleen Huddleston   MRN: [id number] : 1960        Procedure : Procedure(s):  Colonoscopy          Past Medical History:   Diagnosis Date     Bacterial pneumonia 2010     Bitten by dog 2012    hospitalized     Chronic sinusitis     Chronic Sinusitis     Nonrheumatic mitral valve insufficiency 2005    Trace Mitral Regurgitation, Echo.  Does not require SBE     Other shoulder lesions, left shoulder     L shoulder RTC tendonitis     Personal history of other medical treatment (CODE)     prophylaxis     Personal history of other medical treatment (CODE) 2005    Echocardiogram 3/11/05 for family history of mitral valve and aortic valve replacement.  Trace mitral regurgitation.  Mitral valve appeared normal.  Tricuspid aortic valve without AI or AS.  No focal wall motion abnormalities.  Ejection fraction 72% 3/12/05.     Primary osteoarthritis of right knee     No Comments Provided     Tobacco use     quit 2012      Past Surgical History:   Procedure Laterality Date     ARTHROSCOPY KNEE Right 2001    Dr. 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     ARTHROSCOPY SHOULDER  2012,Left, supraspinatus repair     COLONOSCOPY  01/10/2013    hyperplastic, 1/10/23     ECHOCARDIOGRAM INTRAOPERATIVE IN OR  2005    for family history of mitral valve and aortic valve replacement.  Trace mitral regurgitation.  Mitral valve appeared normal.  Tricuspid aortic valve without AI or AS.  No focal wall motion abnormalities.  Ejection fraction 72%     OTHER SURGICAL HISTORY  2006    PREMALIG/BENIGN SKIN LESION EXCISION,Excision of a benign verrucose keratosis with actinic damage and lentiginous change.     OTHER SURGICAL HISTORY  2006    CO SODIUM HYALONURATE PER INJECTION,Synvisc injection     RELEASE TRIGGER FINGER N/A 2022    Procedure: Right Long (Middle) Trigger Finger Release;  Surgeon: Shadia  MD Flako;  Location:  OR     TONSILLECTOMY      No Comments Provided     TOTAL KNEE ARTHROPLASTY Right       Allergies   Allergen Reactions     Lisinopril      cough      Social History     Tobacco Use     Smoking status: Every Day     Packs/day: 0.75     Years: 45.00     Pack years: 33.75     Types: Cigarettes     Last attempt to quit: 10/1/2012     Years since quitting: 10.7     Smokeless tobacco: Never   Substance Use Topics     Alcohol use: Not Currently     Alcohol/week: 3.0 standard drinks of alcohol      Wt Readings from Last 1 Encounters:   23 82.6 kg (182 lb)        Anesthesia Evaluation   Pt has had prior anesthetic.     No history of anesthetic complications       ROS/MED HX  ENT/Pulmonary:     (+) tobacco use, Current use, Intermittent, asthma Treatment: Inhaler prn,      Neurologic:  - neg neurologic ROS     Cardiovascular:     (+) hypertension-----Previous cardiac testing   Echo: Date: 22 Results:  Impression  No impression found.      Narrative  [id number]  [id number]  [id number]  [id number]^JALEN^PAULIE^BECKY     [facility]  [address] [place] [address]     Name: ARABELLA REYES  MRN: [id number]  : 1960  Study Date: 2022 08:25 AM  Age: 62 yrs  Gender: Female  Patient Location: [facility]  Reason For Study: Essential hypertension, Tachycardia, Systolic murmur, Aortic  jamey  Ordering Physician: PAULIE RAO  Referring Physician: PAULIE RAO  Performed By: Viv Seaman RDCS, RVT     BSA: 1.8 m2  Height: 62 in  Weight: 176 lb  HR: 70  BP: 134/72 mmHg  ______________________________________________________________________________  Procedure  Echocardiogram with two-dimensional, color and spectral Doppler performed.  ______________________________________________________________________________  Interpretation Summary  Global and regional left ventricular function is hyperkinetic with an EF of  65-70%.  Right ventricular function, chamber  size, wall motion, and thickness are  normal.  Pulmonary artery systolic pressure is normal.  The inferior vena cava is normal.  No pericardial effusion is present.  No significant changes noted.  ______________________________________________________________________________  Left Ventricle  Global and regional left ventricular function is hyperkinetic with an EF of  65-70%. Left ventricular size is normal. Relative wall thickness is increased  consistent with concentric remodeling. Left ventricular diastolic function is  indeterminate. Diastolic Doppler findings (E/E' ratio and/or other parameters)  suggest left ventricular filling pressures are normal. No regional wall motion  abnormalities are seen.     Right Ventricle  Right ventricular function, chamber size, wall motion, and thickness are  normal.     Atria  Both atria appear normal.     Mitral Valve  The mitral valve is normal.     Aortic Valve  Mild aortic valve calcification is present. The mean AoV pressure gradient is  9.5 mmHg. The calculated aortic valve are is 2.0 cm^2.     Tricuspid Valve  The tricuspid valve is normal. Trace to mild tricuspid insufficiency is  present. The right ventricular systolic pressure is approximated at 19.1 mmHg  plus the right atrial pressure. Pulmonary artery systolic pressure is normal.     Pulmonic Valve  The pulmonic valve is normal.     Vessels  The thoracic aorta is normal. The pulmonary artery and bifurcation cannot be  assessed. The inferior vena cava is normal.     Pericardium  No pericardial effusion is present.     Compared to Previous Study  No significant changes noted.     ______________________________________________________________________________  MMode/2D Measurements & Calculations  IVSd: 1.2 cm  LVIDd: 4.4 cm  LVIDs: 2.8 cm  LVPWd: 1.0 cm  FS: 36.9 %  LV mass(C)d: 178.0 grams  LV mass(C)dI: 98.3 grams/m2     Ao root diam: 2.8 cm  asc Aorta Diam: 3.3 cm  LVOT diam: 1.9 cm  LVOT area: 2.8 cm2  LA Volume  (BP): 39.4 ml  LA Volume Index (BP): 21.8 ml/m2  RWT: 0.46     Doppler Measurements & Calculations  MV E max larry: 65.5 cm/sec  MV A max larry: 62.6 cm/sec  MV E/A: 1.0  MV dec slope: 214.0 cm/sec2  MV dec time: 0.31 sec     Ao V2 max: 202.0 cm/sec  Ao max P.0 mmHg  Ao V2 mean: 145.5 cm/sec  Ao mean P.5 mmHg  Ao V2 VTI: 46.3 cm  JANI(I,D): 2.0 cm2  JANI(V,D): 2.1 cm2  LV V1 max P.6 mmHg  LV V1 max: 147.0 cm/sec  LV V1 VTI: 33.2 cm  SV(LVOT): 94.1 ml  SI(LVOT): 52.0 ml/m2  PA acc time: 0.11 sec  TR max larry: 218.3 cm/sec  TR max P.1 mmHg  AV Larry Ratio (DI): 0.73  JANI Index (cm2/m2): 1.1  E/E' av.6  Lateral E/e': 7.5  Medial E/e': 11.6     ______________________________________________________________________________  Report approved by: Danilo Sterling 2022 09:21 AM         Stress Test: Date: Results:    ECG Reviewed: Date: 22 Results:  Sinus rhythm   Nonspecific ST abnormality   Abnormal ECG   When compared with ECG of 2021 11:26,   Non-specific ST wave changes are now Present   Confirmed by MD MARCELL, ROBERT ([id number]) on 2022 11:49:04 AM   Cath: Date: Results:      METS/Exercise Tolerance:     Hematologic:  - neg hematologic  ROS     Musculoskeletal:  - neg musculoskeletal ROS     GI/Hepatic:     (+) GERD, Asymptomatic on medication, liver disease,     Renal/Genitourinary:  - neg Renal ROS     Endo: Comment: Lupus    (+) Obesity,     Psychiatric/Substance Use:  - neg psychiatric ROS     Infectious Disease:  - neg infectious disease ROS     Malignancy:  - neg malignancy ROS     Other:  - neg other ROS          Physical Exam    Airway        Mallampati: I   TM distance: > 3 FB   Neck ROM: full   Mouth opening: > 3 cm    Respiratory Devices and Support         Dental       (+) Minor Abnormalities - some fillings, tiny chips      Cardiovascular   cardiovascular exam normal       Rhythm and rate: regular and normal   (+) murmur       Pulmonary   pulmonary exam normal        breath  sounds clear to auscultation           OUTSIDE LABS:  CBC:   Lab Results   Component Value Date    WBC 4.0 05/16/2022    WBC 4.4 09/21/2021    HGB 16.1 (H) 05/16/2022    HGB 15.3 09/21/2021    HCT 47.1 (H) 05/16/2022    HCT 44.0 09/21/2021    PLT 97 (L) 05/16/2022    PLT 90 (L) 09/21/2021     BMP:   Lab Results   Component Value Date     10/17/2022     05/16/2022    POTASSIUM 4.2 10/17/2022    POTASSIUM 3.8 05/16/2022    CHLORIDE 103 10/17/2022    CHLORIDE 103 05/16/2022    CO2 28 10/17/2022    CO2 27 05/16/2022    BUN 12 10/17/2022    BUN 19 05/16/2022    CR 0.81 10/17/2022    CR 0.77 05/16/2022     (H) 10/17/2022     (H) 05/27/2022     COAGS:   Lab Results   Component Value Date    INR 1.04 05/16/2022     POC: No results found for: BGM, HCG, HCGS  HEPATIC:   Lab Results   Component Value Date    ALBUMIN 4.5 10/17/2022    PROTTOTAL 7.4 10/17/2022    ALT 21 10/17/2022    AST 23 10/17/2022    ALKPHOS 70 10/17/2022    BILITOTAL 1.2 (H) 10/17/2022     OTHER:   Lab Results   Component Value Date    A1C 5.4 10/17/2022    DOYLE 10.0 10/17/2022    MAG 1.6 (L) 03/18/2021    CRP 1.4 09/21/2021    SED 7 09/21/2021       Anesthesia Plan    ASA Status:  3   NPO Status:  NPO Appropriate    Anesthesia Type: MAC.     - Reason for MAC: straight local not clinically adequate   Induction: Intravenous.           Consents    Anesthesia Plan(s) and associated risks, benefits, and realistic alternatives discussed. Questions answered and patient/representative(s) expressed understanding.     - Discussed: Risks, Benefits and Alternatives for BOTH SEDATION and the PROCEDURE were discussed     - Discussed with:  Patient         Postoperative Care            Comments:                SMITHA MOHAN CRNA

## 2023-07-11 NOTE — OR NURSING
The patient was unable or refused to remove jewelry prior to their surgical procedure. The patient has been instructed on the risk of injury and possible infection. In the event jewelry or piercings are obstructing the surgical process or impeding circulation, the jewelry or piercings may need to be cut off. The surgeon and anesthesia provider have been notified that an item cannot be removed, or that the patient refuses to remove the jewelry or piercing. The surgeon and anesthesia provider will determine if it is in the patient's best interest to proceed with the procedure with the jewelry or piercings remaining in contact with the patient's body.     Bracelet right arm.

## 2023-07-11 NOTE — OP NOTE
COLONOSCOPY PROCEDURE NOTE    DATE OF SERVICE: 7/11/2023    SURGEON: DENICE Rojas MD     PRE-OP DIAGNOSIS:    Healthcare maintenance     POST-OP DIAGNOSIS:    Internal and External hemorrhoids  Sigmoid Diverticulosis  Polyps at transverse colon x2    PROCEDURE:   Colonoscopy with snare polypectomy      ASSISTANT:  Circulator: Shania Lopez RN  Scrub Person: Tiara Doty    ANESTHESIA:  MAC                            MACCRNA Independent: Jason Rocha APRN CRNA    INDICATION FOR THE PROCEDURE: The patient is a 63 year old female with average risk for colon cancer.     PROCEDURE: After adequate sedation, the patient was in the left lateral decubitus position.  Rectal exam was performed.  There was normal tone and no palpable masses, external hemorrhoids noted.  The colonoscope was introduced into the rectum and advanced to the cecum with Mild difficulty.  The patient's prep was excellent.  The terminal cecum was reached.  The cecum, ascending, transverse, descending and sigmoid colon were significant for 1 flat, 4 mm polyp in the proximal transverse colon removed with cold snare, one 6 mm polyp in the distal transverse colon removed with cold snare, mild sigmoid diverticulosis.  The scope was retroflexed in the rectum.  The anorectal junction was remarkable for likely grade 1 internal hemorrhoids.  The scope was straightened and removed.  The patient tolerated the procedure well.     ESTIMATED BLOOD LOSS: none    COMPLICATIONS:  None    TISSUE REMOVED:  Yes    RECOMMEND:    Follow-up pending pathology  Fiber  Given literature on diverticulosis      DENICE Rojas MD       ;

## 2023-07-11 NOTE — ANESTHESIA POSTPROCEDURE EVALUATION
Patient: Charleen Huddleston    Procedure: Procedure(s):  COLONOSCOPY, WITH POLYPECTOMY        Anesthesia Type:  MAC    Note:  Disposition: Outpatient   Postop Pain Control: Uneventful            Sign Out: Well controlled pain   PONV: No   Neuro/Psych: Uneventful            Sign Out: Acceptable/Baseline neuro status   Airway/Respiratory: Uneventful            Sign Out: Acceptable/Baseline resp. status   CV/Hemodynamics: Uneventful            Sign Out: Acceptable CV status; No obvious hypovolemia; No obvious fluid overload   Other NRE: NONE   DID A NON-ROUTINE EVENT OCCUR? No           Last vitals:  Vitals Value Taken Time   /70 07/11/23 1130   Temp 97  F (36.1  C) 07/11/23 1112   Pulse 68 07/11/23 1130   Resp 16 07/11/23 1112   SpO2 97 % 07/11/23 1139   Vitals shown include unvalidated device data.    Electronically Signed By: SMITHA MOHAN CRNA  July 11, 2023  1:02 PM

## 2023-07-13 LAB
PATH REPORT.COMMENTS IMP SPEC: NORMAL
PATH REPORT.FINAL DX SPEC: NORMAL
PATH REPORT.RELEVANT HX SPEC: NORMAL
PHOTO IMAGE: NORMAL

## 2023-08-18 ENCOUNTER — OFFICE VISIT (OUTPATIENT)
Dept: ORTHOPEDICS | Facility: OTHER | Age: 63
End: 2023-08-18
Attending: SPECIALIST
Payer: COMMERCIAL

## 2023-08-18 DIAGNOSIS — M18.0 PRIMARY OSTEOARTHRITIS OF BOTH FIRST CARPOMETACARPAL JOINTS: Primary | ICD-10-CM

## 2023-08-18 PROCEDURE — 250N000011 HC RX IP 250 OP 636: Performed by: SPECIALIST

## 2023-08-18 PROCEDURE — 99213 OFFICE O/P EST LOW 20 MIN: CPT | Mod: 25 | Performed by: SPECIALIST

## 2023-08-18 PROCEDURE — 20600 DRAIN/INJ JOINT/BURSA W/O US: CPT | Mod: LT | Performed by: SPECIALIST

## 2023-08-18 RX ORDER — METHYLPREDNISOLONE ACETATE 40 MG/ML
40 INJECTION, SUSPENSION INTRA-ARTICULAR; INTRALESIONAL; INTRAMUSCULAR; SOFT TISSUE ONCE
Status: COMPLETED | OUTPATIENT
Start: 2023-08-18 | End: 2023-08-18

## 2023-08-18 RX ORDER — BUPIVACAINE HYDROCHLORIDE 5 MG/ML
1 INJECTION, SOLUTION EPIDURAL; INTRACAUDAL ONCE
Status: COMPLETED | OUTPATIENT
Start: 2023-08-18 | End: 2023-08-18

## 2023-08-18 RX ADMIN — METHYLPREDNISOLONE ACETATE 40 MG: 40 INJECTION, SUSPENSION INTRA-ARTICULAR; INTRALESIONAL; INTRAMUSCULAR; INTRASYNOVIAL; SOFT TISSUE at 09:06

## 2023-08-18 RX ADMIN — BUPIVACAINE HYDROCHLORIDE 5 MG: 5 INJECTION, SOLUTION EPIDURAL; INTRACAUDAL; PERINEURAL at 09:06

## 2023-08-18 NOTE — PROGRESS NOTES
Subjective:    Charleen returns for follow-up she would like to schedule basilar joint reconstruction on the left in October.     Objective:    Physical examination reveals tenderness with palpation over the basilar joint with a positive grind maneuver.  Palmar abduction is mildly limited.  This is on the left side.  Imaging:     No new imaging  Assessment:    Left basal joint arthrosis    Procedure note: The thumb left  Was prepped with alcohol.  The basilar joint was injected with 1 cc of Depo-Medrol 40 mg/cc and 1 cc of 0.25% Marcaine without epinephrine.  A Band-Aid was applied.  There were no complications.     Plan:    Proceed with left thumb basal joint reconstruction at her convenience.    Flako Reno MD

## 2023-08-21 DIAGNOSIS — J45.20 MILD INTERMITTENT ASTHMA WITHOUT COMPLICATION: ICD-10-CM

## 2023-08-23 RX ORDER — ALBUTEROL SULFATE 90 UG/1
AEROSOL, METERED RESPIRATORY (INHALATION)
Qty: 54 G | Refills: 0 | Status: SHIPPED | OUTPATIENT
Start: 2023-08-23 | End: 2023-09-18

## 2023-08-23 NOTE — TELEPHONE ENCOUNTER
[id number] of [place] sent Rx request for the following:      Requested Prescriptions   Pending Prescriptions Disp Refills    albuterol (PROAIR HFA/PROVENTIL HFA/VENTOLIN HFA) 108 (90 Base) MCG/ACT inhaler [Pharmacy Med Name: ALBUTEROL HFA 90MCG/ACTU INH] 18 g 11     Sig: INHALE 2 PUFFS BY MOUTH DAILY AS NEEDED       Asthma Maintenance Inhalers - Anticholinergics Failed - 8/23/2023 10:53 AM    Short-Acting Beta Agonist Inhalers Protocol  Failed - 8/23/2023 10:53 AM        Failed - Asthma control assessment score within normal limits in last 6 months     Please review ACT score.      Last Prescription Date:   3/22/22  Last Fill Qty/Refills:         18 G, R-11    Last Office Visit:              10/17/22   Future Office visit:             Next 5 appointments (look out 90 days)      Sep 18, 2023  8:20 AM  Pre-Op physical with Elisa Doherty NP  [facility] ([facility] ) [address]  [place] [address]  [phone]          Dorothea Gaming RN .............. 8/23/2023  10:56 AM

## 2023-09-18 ENCOUNTER — OFFICE VISIT (OUTPATIENT)
Dept: INTERNAL MEDICINE | Facility: OTHER | Age: 63
End: 2023-09-18
Attending: NURSE PRACTITIONER
Payer: COMMERCIAL

## 2023-09-18 VITALS
WEIGHT: 187.6 LBS | DIASTOLIC BLOOD PRESSURE: 74 MMHG | HEIGHT: 64 IN | SYSTOLIC BLOOD PRESSURE: 139 MMHG | RESPIRATION RATE: 18 BRPM | OXYGEN SATURATION: 97 % | BODY MASS INDEX: 32.03 KG/M2 | TEMPERATURE: 97.8 F | HEART RATE: 65 BPM

## 2023-09-18 DIAGNOSIS — E78.2 MIXED HYPERLIPIDEMIA: ICD-10-CM

## 2023-09-18 DIAGNOSIS — Z01.818 PRE-OP EXAM: ICD-10-CM

## 2023-09-18 DIAGNOSIS — Z87.891 PERSONAL HISTORY OF TOBACCO USE: ICD-10-CM

## 2023-09-18 DIAGNOSIS — K21.9 GASTROESOPHAGEAL REFLUX DISEASE WITHOUT ESOPHAGITIS: ICD-10-CM

## 2023-09-18 DIAGNOSIS — I10 PRIMARY HYPERTENSION: ICD-10-CM

## 2023-09-18 DIAGNOSIS — K76.0 NAFLD (NONALCOHOLIC FATTY LIVER DISEASE): ICD-10-CM

## 2023-09-18 DIAGNOSIS — J45.20 MILD INTERMITTENT ASTHMA WITHOUT COMPLICATION: Primary | ICD-10-CM

## 2023-09-18 DIAGNOSIS — R91.8 PULMONARY NODULES: ICD-10-CM

## 2023-09-18 DIAGNOSIS — R73.03 PREDIABETES: ICD-10-CM

## 2023-09-18 DIAGNOSIS — Z12.31 VISIT FOR SCREENING MAMMOGRAM: ICD-10-CM

## 2023-09-18 LAB
ALBUMIN SERPL BCG-MCNC: 4.5 G/DL (ref 3.5–5.2)
ALP SERPL-CCNC: 91 U/L (ref 35–104)
ALT SERPL W P-5'-P-CCNC: 47 U/L (ref 0–50)
ANION GAP SERPL CALCULATED.3IONS-SCNC: 12 MMOL/L (ref 7–15)
AST SERPL W P-5'-P-CCNC: 33 U/L (ref 0–45)
BILIRUB SERPL-MCNC: 0.8 MG/DL
BUN SERPL-MCNC: 14.9 MG/DL (ref 8–23)
CALCIUM SERPL-MCNC: 9.6 MG/DL (ref 8.8–10.2)
CHLORIDE SERPL-SCNC: 102 MMOL/L (ref 98–107)
CHOLEST SERPL-MCNC: 173 MG/DL
CREAT SERPL-MCNC: 0.72 MG/DL (ref 0.51–0.95)
DEPRECATED HCO3 PLAS-SCNC: 25 MMOL/L (ref 22–29)
EGFRCR SERPLBLD CKD-EPI 2021: >90 ML/MIN/1.73M2
ERYTHROCYTE [DISTWIDTH] IN BLOOD BY AUTOMATED COUNT: 12.7 % (ref 10–15)
GLUCOSE SERPL-MCNC: 116 MG/DL (ref 70–99)
HBA1C MFR BLD: 5.6 % (ref 4–6.2)
HCT VFR BLD AUTO: 44.3 % (ref 35–47)
HDLC SERPL-MCNC: 75 MG/DL
HGB BLD-MCNC: 15.2 G/DL (ref 11.7–15.7)
INR PPP: 0.98 (ref 0.85–1.15)
LDLC SERPL CALC-MCNC: 74 MG/DL
MCH RBC QN AUTO: 32.3 PG (ref 26.5–33)
MCHC RBC AUTO-ENTMCNC: 34.3 G/DL (ref 31.5–36.5)
MCV RBC AUTO: 94 FL (ref 78–100)
NONHDLC SERPL-MCNC: 98 MG/DL
PLATELET # BLD AUTO: 92 10E3/UL (ref 150–450)
POTASSIUM SERPL-SCNC: 4.3 MMOL/L (ref 3.4–5.3)
PROT SERPL-MCNC: 7.3 G/DL (ref 6.4–8.3)
RBC # BLD AUTO: 4.7 10E6/UL (ref 3.8–5.2)
SODIUM SERPL-SCNC: 139 MMOL/L (ref 136–145)
TRIGL SERPL-MCNC: 121 MG/DL
WBC # BLD AUTO: 3.6 10E3/UL (ref 4–11)

## 2023-09-18 PROCEDURE — 85027 COMPLETE CBC AUTOMATED: CPT | Mod: ZL | Performed by: NURSE PRACTITIONER

## 2023-09-18 PROCEDURE — G0296 VISIT TO DETERM LDCT ELIG: HCPCS | Performed by: NURSE PRACTITIONER

## 2023-09-18 PROCEDURE — 93000 ELECTROCARDIOGRAM COMPLETE: CPT | Performed by: STUDENT IN AN ORGANIZED HEALTH CARE EDUCATION/TRAINING PROGRAM

## 2023-09-18 PROCEDURE — 99214 OFFICE O/P EST MOD 30 MIN: CPT | Mod: 25 | Performed by: NURSE PRACTITIONER

## 2023-09-18 PROCEDURE — 83036 HEMOGLOBIN GLYCOSYLATED A1C: CPT | Mod: ZL | Performed by: NURSE PRACTITIONER

## 2023-09-18 PROCEDURE — 82310 ASSAY OF CALCIUM: CPT | Mod: ZL | Performed by: NURSE PRACTITIONER

## 2023-09-18 PROCEDURE — 80061 LIPID PANEL: CPT | Mod: ZL | Performed by: NURSE PRACTITIONER

## 2023-09-18 PROCEDURE — 85610 PROTHROMBIN TIME: CPT | Mod: ZL | Performed by: NURSE PRACTITIONER

## 2023-09-18 PROCEDURE — 36415 COLL VENOUS BLD VENIPUNCTURE: CPT | Mod: ZL | Performed by: NURSE PRACTITIONER

## 2023-09-18 RX ORDER — LOSARTAN POTASSIUM AND HYDROCHLOROTHIAZIDE 12.5; 5 MG/1; MG/1
1 TABLET ORAL DAILY
Qty: 180 TABLET | Refills: 3 | Status: SHIPPED | OUTPATIENT
Start: 2023-09-18

## 2023-09-18 RX ORDER — ATORVASTATIN CALCIUM 20 MG/1
20 TABLET, FILM COATED ORAL AT BEDTIME
Qty: 180 TABLET | Refills: 3 | Status: SHIPPED | OUTPATIENT
Start: 2023-09-18

## 2023-09-18 RX ORDER — ALBUTEROL SULFATE 90 UG/1
AEROSOL, METERED RESPIRATORY (INHALATION)
Qty: 54 G | Refills: 3 | Status: SHIPPED | OUTPATIENT
Start: 2023-09-18 | End: 2023-09-22

## 2023-09-18 RX ORDER — FLUTICASONE PROPIONATE AND SALMETEROL 250; 50 UG/1; UG/1
POWDER RESPIRATORY (INHALATION)
Qty: 60 EACH | Refills: 11 | Status: SHIPPED | OUTPATIENT
Start: 2023-09-18

## 2023-09-18 RX ORDER — METOPROLOL SUCCINATE 25 MG/1
25 TABLET, EXTENDED RELEASE ORAL DAILY
Qty: 180 TABLET | Refills: 3 | Status: SHIPPED | OUTPATIENT
Start: 2023-09-18 | End: 2024-06-25

## 2023-09-18 ASSESSMENT — ENCOUNTER SYMPTOMS
SINUS PRESSURE: 0
ANAL BLEEDING: 0
DIAPHORESIS: 0
HEMATOCHEZIA: 0
CHILLS: 0
SHORTNESS OF BREATH: 0
ABDOMINAL PAIN: 0
SINUS PAIN: 0
PARESTHESIAS: 0
FEVER: 0
ARTHRALGIAS: 1
WHEEZING: 1
NERVOUS/ANXIOUS: 0
DYSPHORIC MOOD: 0
ADENOPATHY: 0
TROUBLE SWALLOWING: 0
DYSURIA: 0
DIFFICULTY URINATING: 0
HEARTBURN: 0
LIGHT-HEADEDNESS: 0
PALPITATIONS: 0
FLANK PAIN: 0
DIZZINESS: 0
CHEST TIGHTNESS: 0
SORE THROAT: 0
ENDOCRINE NEGATIVE: 1
HEMATURIA: 0
NUMBNESS: 0
UNEXPECTED WEIGHT CHANGE: 0

## 2023-09-18 ASSESSMENT — PAIN SCALES - GENERAL: PAINLEVEL: NO PAIN (0)

## 2023-09-18 ASSESSMENT — ASTHMA QUESTIONNAIRES: ACT_TOTALSCORE: 22

## 2023-09-18 NOTE — PATIENT INSTRUCTIONS
Lung Cancer Screening   Frequently Asked Questions  If you are at high-risk for lung cancer, getting screened with low-dose computed tomography (LDCT) every year can help save your life. This handout offers answers to some of the most common questions about lung cancer screening. If you have other questions, please call [phone] ([phone]).     What is it?  Lung cancer screening uses special X-ray technology to create an image of your lung tissue. The exam is quick and easy and takes less than 10 seconds. We don t give you any medicine or use any needles. You can eat before and after the exam. You don t need to change your clothes as long as the clothing on your chest doesn t contain metal. But, you do need to be able to hold your breath for at least 6 seconds during the exam.    What is the goal of lung cancer screening?  The goal of lung cancer screening is to save lives. Many times, lung cancer is not found until a person starts having physical symptoms. Lung cancer screening can help detect lung cancer in the earliest stages when it may be easier to treat.    Who should be screened for lung cancer?  We suggest lung cancer screening for anyone who is at high-risk for lung cancer. You are in the high-risk group if you:      are between the ages of 55 and 79, and    have smoked at least 1 pack of cigarettes a day for 20 or more years, and    still smoke or have quit within the past 15 years.    However, if you have a new cough or shortness of breath, you should talk to your doctor before being screened.    Why does it matter if I have symptoms?  Certain symptoms can be a sign that you have a condition in your lungs that should be checked and treated by your doctor. These symptoms include fever, chest pain, a new or changing cough, shortness of breath that you have never felt before, coughing up blood or unexplained weight loss. Having any of these symptoms can greatly affect the results of lung  cancer screening.       Should all smokers get an LDCT lung cancer screening exam?  It depends. Lung cancer screening is for a very specific group of men and women who have a history of heavy smoking over a long period of time (see  Who should be screened for lung cancer  above).  I am in the high-risk group, but have been diagnosed with cancer in the past. Is LDCT lung cancer screening right for me?  In some cases, you should not have LDCT lung screening, such as when your doctor is already following your cancer with CT scan studies. Your doctor will help you decide if LDCT lung screening is right for you.  Do I need to have a screening exam every year?  Yes. If you are in the high-risk group described earlier, you should get an LDCT lung cancer screening exam every year until you are 79, or are no longer willing or able to undergo screening and possible procedures to diagnose and treat lung cancer.  How effective is LDCT at preventing death from lung cancer?  Studies have shown that LDCT lung cancer screening can lower the risk of death from lung cancer by 20 percent in people who are at high-risk.  What are the risks?  There are some risks and limitations of LDCT lung cancer screening. We want to make sure you understand the risks and benefits, so please let us know if you have any questions. Your doctor may want to talk with you more about these risks.    Radiation exposure: As with any exam that uses radiation, there is a very small increased risk of cancer. The amount of radiation in LDCT is small--about the same amount a person would get from a mammogram. Your doctor orders the exam when he or she feels the potential benefits outweigh the risks.    False negatives: No test is perfect, including LDCT. It is possible that you may have a medical condition, including lung cancer, that is not found during your exam. This is called a false negative result.    False positives and more testing: LDCT very often finds  something in the lung that could be cancer, but in fact is not. This is called a false positive result. False positive tests often cause anxiety. To make sure these findings are not cancer, you may need to have more tests. These tests will be done only if you give us permission. Sometimes patients need a treatment that can have side effects, such as a biopsy. For more information on false positives, see  What can I expect from the results?     Findings not related to lung cancer: Your LDCT exam also takes pictures of areas of your body next to your lungs. In a very small number of cases, the CT scan will show an abnormal finding in one of these areas, such as your kidneys, adrenal glands, liver or thyroid. This finding may not be serious, but you may need more tests. Your doctor can help you decide what other tests you may need, if any.  What can I expect from the results?  About 1 out of 4 LDCT exams will find something that may need more tests. Most of the time, these findings are lung nodules. Lung nodules are very small collections of tissue in the lung. These nodules are very common, and the vast majority--more than 97 percent--are not cancer (benign). Most are normal lymph nodes or small areas of scarring from past infections.  But, if a small lung nodule is found to be cancer, the cancer can be cured more than 90 percent of the time. To know if the nodule is cancer, we may need to get more images before your next yearly screening exam. If the nodule has suspicious features (for example, it is large, has an odd shape or grows over time), we will refer you to a specialist for further testing.  Will my doctor also get the results?  Yes. Your doctor will get a copy of your results.  Is it okay to keep smoking now that there s a cancer screening exam?  No. Tobacco is one of the strongest cancer-causing agents. It causes not only lung cancer, but other cancers and cardiovascular (heart) diseases as well. The damage  caused by smoking builds over time. This means that the longer you smoke, the higher your risk of disease. While it is never too late to quit, the sooner you quit, the better.  Where can I find help to quit smoking?  The best way to prevent lung cancer is to stop smoking. If you have already quit smoking, congratulations and keep it up! For help on quitting smoking, please call [employer] at 1-030-QUITNOW (1-646.639.9080) or the [employer] at 1-302.605.5990 to find local resources near you.  One-on-one health coaching:  If you d prefer to work individually with a health care provider on tobacco cessation, we offer:      Medication Therapy Management:  Our specially trained pharmacists work closely with you and your doctor to help you quit smoking.  Call [phone] or [phone] (toll free).

## 2023-09-18 NOTE — NURSING NOTE
"Chief Complaint   Patient presents with    Pre-Op Exam         Initial /74 (BP Location: Right arm, Patient Position: Sitting, Cuff Size: Adult Regular)   Pulse 65   Temp 97.8  F (36.6  C) (Tympanic)   Resp 18   Ht 1.626 m (5' 4\")   Wt 85.1 kg (187 lb 9.6 oz)   LMP  (LMP Unknown)   SpO2 97%   Breastfeeding No   BMI 32.20 kg/m   Estimated body mass index is 32.2 kg/m  as calculated from the following:    Height as of this encounter: 1.626 m (5' 4\").    Weight as of this encounter: 85.1 kg (187 lb 9.6 oz).       FOOD SECURITY SCREENING QUESTIONS:    The next two questions are to help us understand your food security.  If you are feeling you need any assistance in this area, we have resources available to support you today.    Hunger Vital Signs:  Within the past 12 months we worried whether our food would run out before we got money to buy more. Never  Within the past 12 months the food we bought just didn't last and we didn't have money to get more. Never  Carmen Prather LPN on 9/18/2023 at 8:17 AM     Carmen Prather   "
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
## 2023-09-20 DIAGNOSIS — J45.20 MILD INTERMITTENT ASTHMA WITHOUT COMPLICATION: ICD-10-CM

## 2023-09-20 LAB
ATRIAL RATE - MUSE: 57 BPM
DIASTOLIC BLOOD PRESSURE - MUSE: NORMAL MMHG
INTERPRETATION ECG - MUSE: NORMAL
P AXIS - MUSE: 42 DEGREES
PR INTERVAL - MUSE: 142 MS
QRS DURATION - MUSE: 88 MS
QT - MUSE: 400 MS
QTC - MUSE: 389 MS
R AXIS - MUSE: 55 DEGREES
SYSTOLIC BLOOD PRESSURE - MUSE: NORMAL MMHG
T AXIS - MUSE: 50 DEGREES
VENTRICULAR RATE- MUSE: 57 BPM

## 2023-09-29 RX ORDER — ALBUTEROL SULFATE 90 UG/1
AEROSOL, METERED RESPIRATORY (INHALATION)
Qty: 54 G | Refills: 3 | Status: SHIPPED | OUTPATIENT
Start: 2023-09-18

## 2023-09-29 NOTE — TELEPHONE ENCOUNTER
"  Last Prescription Date: 9/18/23  Last Qty/Refills: 54 g / 3  Last Office Visit: 9/18/23 Chas  Future Office Visit: none     Requested Prescriptions   Pending Prescriptions Disp Refills    albuterol (PROAIR HFA/PROVENTIL HFA/VENTOLIN HFA) 108 (90 Base) MCG/ACT inhaler 54 g 3     Sig: INHALE 2 PUFFS BY MOUTH DAILY AS NEEDED       Asthma Maintenance Inhalers - Anticholinergics Passed - 9/22/2023  9:17 AM        Passed - Patient is age 12 years or older        Passed - Asthma control assessment score within normal limits in last 6 months     Please review ACT score.           Passed - Medication is active on med list        Passed - Recent (6 mo) or future (30 days) visit within the authorizing provider's specialty     Patient had office visit in the last 6 months or has a visit in the next 30 days with authorizing provider or within the authorizing provider's specialty.  See \"Patient Info\" tab in inbasket, or \"Choose Columns\" in Meds & Orders section of the refill encounter.           Short-Acting Beta Agonist Inhalers Protocol  Passed - 9/22/2023  9:17 AM        Passed - Patient is age 12 or older        Passed - Asthma control assessment score within normal limits in last 6 months     Please review ACT score.           Passed - Medication is active on med list        Passed - Recent (6 mo) or future (30 days) visit within the authorizing provider's specialty     Patient had office visit in the last 6 months or has a visit in the next 30 days with authorizing provider or within the authorizing provider's specialty.  See \"Patient Info\" tab in inbasket, or \"Choose Columns\" in Meds & Orders section of the refill encounter.                   "

## 2023-10-19 ENCOUNTER — TRANSFERRED RECORDS (OUTPATIENT)
Dept: HEALTH INFORMATION MANAGEMENT | Facility: OTHER | Age: 63
End: 2023-10-19
Payer: COMMERCIAL

## 2023-10-27 ENCOUNTER — TRANSFERRED RECORDS (OUTPATIENT)
Dept: HEALTH INFORMATION MANAGEMENT | Facility: OTHER | Age: 63
End: 2023-10-27

## 2023-10-27 ENCOUNTER — TELEPHONE (OUTPATIENT)
Dept: ORTHOPEDICS | Facility: OTHER | Age: 63
End: 2023-10-27
Payer: COMMERCIAL

## 2023-10-27 NOTE — TELEPHONE ENCOUNTER
[facility] would like Lorena's post op paperwork faxed to them at [phone]. Lorena had her post op on 10/09/2023.      Tiera Sr on 10/27/2023 at 10:30 AM

## 2023-10-30 ENCOUNTER — HOSPITAL ENCOUNTER (OUTPATIENT)
Dept: CT IMAGING | Facility: OTHER | Age: 63
Discharge: HOME | End: 2023-10-30
Attending: NURSE PRACTITIONER
Payer: COMMERCIAL

## 2023-10-30 ENCOUNTER — HOSPITAL ENCOUNTER (OUTPATIENT)
Dept: MAMMOGRAPHY | Facility: OTHER | Age: 63
Discharge: HOME | End: 2023-10-30
Attending: NURSE PRACTITIONER
Payer: COMMERCIAL

## 2023-10-30 DIAGNOSIS — Z87.891 PERSONAL HISTORY OF TOBACCO USE: ICD-10-CM

## 2023-10-30 DIAGNOSIS — Z12.31 VISIT FOR SCREENING MAMMOGRAM: ICD-10-CM

## 2023-10-30 PROCEDURE — 71271 CT THORAX LUNG CANCER SCR C-: CPT

## 2023-10-30 PROCEDURE — 77067 SCR MAMMO BI INCL CAD: CPT

## 2023-11-06 ENCOUNTER — TRANSFERRED RECORDS (OUTPATIENT)
Dept: HEALTH INFORMATION MANAGEMENT | Facility: OTHER | Age: 63
End: 2023-11-06
Payer: COMMERCIAL

## 2023-11-09 ENCOUNTER — OFFICE VISIT (OUTPATIENT)
Dept: ORTHOPEDICS | Facility: OTHER | Age: 63
End: 2023-11-09
Attending: SPECIALIST
Payer: COMMERCIAL

## 2023-11-09 DIAGNOSIS — M18.0 PRIMARY OSTEOARTHRITIS OF BOTH FIRST CARPOMETACARPAL JOINTS: Primary | ICD-10-CM

## 2023-11-09 PROCEDURE — 99024 POSTOP FOLLOW-UP VISIT: CPT | Performed by: SPECIALIST

## 2023-11-09 NOTE — PROGRESS NOTES
Subjective:    Patient returns for follow-up 4 weeks status post basal joint reconstruction on the left.  Patient is working with [email or web address] things are going well.  She has had some tenderness over the first web.    Objective:    Incisions healing nicely no evidence of infection.  Imaging:     No new imaging  Assessment:    4 and half weeks status post basal joint reconstruction on the left doing well    Plan:    Patient will see about modifying her hand-based neoprene splint.  She will meet with hand therapy regarding a gel pad may require a smaller size.  She and her  are leaving for [place] this week we will attempt to arrange therapy out there and she will contact us with an address and the protocol will be sent to them.  Follow-up when she returns.    Flako Reno MD

## 2023-11-13 ENCOUNTER — TRANSFERRED RECORDS (OUTPATIENT)
Dept: HEALTH INFORMATION MANAGEMENT | Facility: OTHER | Age: 63
End: 2023-11-13
Payer: COMMERCIAL

## 2023-12-17 ENCOUNTER — HEALTH MAINTENANCE LETTER (OUTPATIENT)
Age: 63
End: 2023-12-17

## 2024-02-25 ENCOUNTER — HEALTH MAINTENANCE LETTER (OUTPATIENT)
Age: 64
End: 2024-02-25

## 2024-06-25 ENCOUNTER — OFFICE VISIT (OUTPATIENT)
Dept: CARDIOLOGY | Facility: OTHER | Age: 64
End: 2024-06-25
Attending: NURSE PRACTITIONER
Payer: COMMERCIAL

## 2024-06-25 VITALS
BODY MASS INDEX: 31 KG/M2 | DIASTOLIC BLOOD PRESSURE: 68 MMHG | HEART RATE: 78 BPM | SYSTOLIC BLOOD PRESSURE: 113 MMHG | HEIGHT: 64 IN | WEIGHT: 181.6 LBS | RESPIRATION RATE: 16 BRPM | TEMPERATURE: 97.7 F | OXYGEN SATURATION: 97 %

## 2024-06-25 DIAGNOSIS — E78.2 MIXED HYPERLIPIDEMIA: ICD-10-CM

## 2024-06-25 DIAGNOSIS — R06.09 DOE (DYSPNEA ON EXERTION): ICD-10-CM

## 2024-06-25 DIAGNOSIS — R73.03 PREDIABETES: ICD-10-CM

## 2024-06-25 DIAGNOSIS — I35.9 AORTIC VALVE CALCIFICATION: ICD-10-CM

## 2024-06-25 DIAGNOSIS — F17.200 TOBACCO DEPENDENCE SYNDROME: ICD-10-CM

## 2024-06-25 DIAGNOSIS — R07.9 CHEST PAIN, UNSPECIFIED TYPE: ICD-10-CM

## 2024-06-25 DIAGNOSIS — I10 ESSENTIAL HYPERTENSION: Primary | ICD-10-CM

## 2024-06-25 DIAGNOSIS — R00.0 SINUS TACHYCARDIA: ICD-10-CM

## 2024-06-25 DIAGNOSIS — I10 PRIMARY HYPERTENSION: ICD-10-CM

## 2024-06-25 DIAGNOSIS — R01.1 HEART MURMUR: ICD-10-CM

## 2024-06-25 LAB
ATRIAL RATE - MUSE: 77 BPM
DIASTOLIC BLOOD PRESSURE - MUSE: NORMAL MMHG
INTERPRETATION ECG - MUSE: NORMAL
P AXIS - MUSE: 55 DEGREES
PR INTERVAL - MUSE: 136 MS
QRS DURATION - MUSE: 86 MS
QT - MUSE: 364 MS
QTC - MUSE: 411 MS
R AXIS - MUSE: 57 DEGREES
SYSTOLIC BLOOD PRESSURE - MUSE: NORMAL MMHG
T AXIS - MUSE: 34 DEGREES
VENTRICULAR RATE- MUSE: 77 BPM

## 2024-06-25 PROCEDURE — 93010 ELECTROCARDIOGRAM REPORT: CPT | Performed by: INTERNAL MEDICINE

## 2024-06-25 PROCEDURE — 93005 ELECTROCARDIOGRAM TRACING: CPT | Performed by: NURSE PRACTITIONER

## 2024-06-25 PROCEDURE — 99214 OFFICE O/P EST MOD 30 MIN: CPT | Performed by: NURSE PRACTITIONER

## 2024-06-25 PROCEDURE — G0463 HOSPITAL OUTPT CLINIC VISIT: HCPCS

## 2024-06-25 RX ORDER — METOPROLOL SUCCINATE 25 MG/1
25 TABLET, EXTENDED RELEASE ORAL DAILY
Qty: 90 TABLET | Refills: 3 | Status: SHIPPED | OUTPATIENT
Start: 2024-06-25 | End: 2024-10-07

## 2024-06-25 ASSESSMENT — PAIN SCALES - GENERAL: PAINLEVEL: NO PAIN (0)

## 2024-06-25 NOTE — PROGRESS NOTES
[facility] HEART CARE   CARDIOLOGY PROGRESS NOTE    Charleen Huddleston   [address] [facility] [id number]      Elisa Doherty     Chief Complaint   Patient presents with    Follow Up     Yearly follow up. Pt says she has developed pain in her left breast near the nipple. She was wondering if it was due to her heart.         HPI:   Ms. Huddleston is a 64 year old female who presents for cardiology follow-up to visit on 10/5/22. She was initially evaluated by cardiology with equivocal stress test and racing heart palpitations.  She has a history of hypertension, hyperlipidemia, prediabetes, SLE, ongoing tobacco abuse, elevated liver enzymes, hepatic steatosis and alcohol-related thrombocytopenia.  Patient does have a family history of structural heart disease, mitral disease in her father and sister.    Patient reported symptoms of racing heart back in March 2021.  She noted the racing heart with a heart rate up to 150s on her Fitbit.  She did not describe any chest pain or pressure at that time, no increased dyspnea.  She had also been identified to be hypertensive, she had been on losartan-hydrochlorothiazide 50-12.5 mg daily.    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 Her FibroScan was completed on 5/13/2021 revealing a F4 fibrosis. No hepatoma on recent US. She is working on weight loss and regular exercise, Mediterranean diet. She has also been abstinent of ETOH use.     Previous echocardiogram in November 2013 revealing normal LV size and function, estimated EF at 65% without regional wall motion abnormalities.  Normal RV size and function.  Mild LA enlargement.  Borderline LVH.  No significant valvular heart disease identified.  The aortic and mitral valves appeared structurally normal.    Last echocardiogram on 4/16/2021 revealing normal global and regional LV function, LVEF 60 to 65%.  Borderline concentric wall thickening of the LV.  No regional wall motion  abnormalities.  Normal RV size and function.  Both atria appeared normal with atrial septum intact.  The mitral valve is normal in structure and function.  Mild aortic valve calcification with a mean aortic valve pressure gradient of 10 mmHg and a calculated JANI 1.6 cm , RVSP 22.8 mmHg plus RAP, normal pulmonary artery systolic pressure.  No pericardial effusion.    Exercise stress test was ordered secondary to hypertension- uncontrolled and racing heart.  This was performed as an exercise stress echocardiogram on 5/4/2021.  This was a submaximal, likely low risk exercise stress echocardiogram.  Patient only achieved 80% of the maximum predicted heart rate.  Normal LV size and function at baseline.  Mild LVH.  The LVEF was 55 to 60% at rest and increased appropriately to greater than 70% after exercise with appropriate decrease in LV cavity size.  Normal heart rate response to exercise.  Hypertensive response to exercise.  No anginal symptoms were endorsed during the study.  Doppler exam with no significant valvular abnormalities.  Her resting ECG revealed nonspecific ST and T wave changes.  Tracings throughout exercise without ectopy of significance.  There was some slight ST depression at almost 1 mm in the inferior and lateral leads with peak exercise.  In the recovery phase, no ectopy and continued ST changes were seen.  No evidence of exercise-induced dysrhythmia.  Inconclusive EKG portion of the stress test for exercise-induced myocardial ischemia.    ZIO monitor ordered for racing heart palpitations (4/20-5/2/21) revealing underlying sinus rhythm.  Average heart rate 76 bpm.  The heart rate ranged from 53 to 116 bpm.  No patient triggered events.  3 nonsustained runs of SVT occurred with the longest lasting 5 beats.  No VT, AF/AFL, symptomatic bradycardia, pauses, second-degree Mobitz type II or third-degree AV block.  Rare SVE and VE, less than 1%.  Ventricular bigeminy was present lasting up to 5.1  seconds.    INTERVAL HISTORY:  Today she describes feeling a sharp pain specific to her left nipple. No drainage or other breast tenderness, no palpable lump, swelling or warmth at site.  No nipple discharge.  No precordial chest pains, no pain in the arm, jaw, neck or back.  Chronic dyspnea, largely unchanged.  No edema. No ETOH use since April 2021.    RELEVANT TESTING:  Echocardiogram 5/23/22  Interpretation Summary  Global and regional left ventricular function is hyperkinetic with an EF of  65-70%.  Right ventricular function, chamber size, wall motion, and thickness are  normal.  Pulmonary artery systolic pressure is normal.  The inferior vena cava is normal.  No pericardial effusion is present.  No significant changes noted.      PAST MEDICAL HISTORY:   Past Medical History:   Diagnosis Date    Bacterial pneumonia 02/02/2010    Bitten by dog 09/2012    hospitalized    Chronic sinusitis     Chronic Sinusitis    Nonrheumatic mitral valve insufficiency 02/2005    Trace Mitral Regurgitation, Echo.  Does not require SBE    Other shoulder lesions, left shoulder     L shoulder RTC tendonitis    Personal history of other medical treatment (CODE)     prophylaxis    Personal history of other medical treatment (CODE) 03/11/2005    Echocardiogram 3/11/05 for family history of mitral valve and aortic valve replacement.  Trace mitral regurgitation.  Mitral valve appeared normal.  Tricuspid aortic valve without AI or AS.  No focal wall motion abnormalities.  Ejection fraction 72% 3/12/05.    Primary osteoarthritis of right knee     No Comments Provided    Tobacco use     quit 9/2012          FAMILY HISTORY:   Family History   Problem Relation Age of Onset    Hyperlipidemia Mother         Hyperlipidemia,Hyperlipidemia    Other - See Comments Mother         Stroke,CVA 03/07    Heart Disease Mother         Heart Disease,aortic and mitral valve replacements, rheumatic fever    Mitral Valve Replacement Mother     Diabetes Father          Diabetes    Mitral Valve Replacement Father     Rheumatic fever Father     Diabetes Sister     Polycystic ovary syndrome Child     Thyroid Disease Child     Heart Disease Sister         Heart Disease,Valvular heart disease and diffuse athersclerosis on brain MRI    Other - See Comments Sister         rheumatic fever    Rheumatic fever Sister     Cerebrovascular Disease Sister     Other - See Comments Maternal Grandmother         Stroke,CVA    Prostate Cancer Paternal Grandfather         Cancer-prostate    Hypertension Sister     Hyperlipidemia Sister           PAST SURGICAL HISTORY:   Past Surgical History:   Procedure Laterality Date    ARTHROSCOPY KNEE Right 08/2001    Dr. 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    ARTHROSCOPY SHOULDER  2012    2011 / 2012,Left, supraspinatus repair    COLONOSCOPY  01/10/2013    hyperplastic, 1/10/23    COLONOSCOPY N/A 07/11/2023    1 small tubular adenoma, follow up 7/11/33    ECHOCARDIOGRAM INTRAOPERATIVE IN OR  03/11/2005    for family history of mitral valve and aortic valve replacement.  Trace mitral regurgitation.  Mitral valve appeared normal.  Tricuspid aortic valve without AI or AS.  No focal wall motion abnormalities.  Ejection fraction 72%    OTHER SURGICAL HISTORY  03/13/2006    PREMALIG/BENIGN SKIN LESION EXCISION,Excision of a benign verrucose keratosis with actinic damage and lentiginous change.    OTHER SURGICAL HISTORY  12/2006    OH SODIUM HYALONURATE PER INJECTION,Synvisc injection    RELEASE TRIGGER FINGER N/A 05/27/2022    Procedure: Right Long (Middle) Trigger Finger Release;  Surgeon: Flako Reno MD;  Location: [facility] OR    TONSILLECTOMY      No Comments Provided    TOTAL KNEE ARTHROPLASTY Right           SOCIAL HISTORY:   Social History     Socioeconomic History    Marital status:      Spouse name: None    Number of children: None    Years of education: None    Highest education level: None   Occupational  History    None   Social Needs    Financial resource strain: None    Food insecurity     Worry: None     Inability: None    Transportation needs     Medical: None     Non-medical: None   Tobacco Use    Smoking status: Current Every Day Smoker     Packs/day: 0.75     Years: 45.00     Pack years: 33.75     Types: Cigarettes     Last attempt to quit: 10/1/2012     Years since quittin.7    Smokeless tobacco: Never Used   Substance and Sexual Activity    Alcohol use: Yes     Alcohol/week: 3.0 standard drinks     Frequency: Monthly or less    Drug use: No    Sexual activity: Yes     Partners: Male   Lifestyle    Physical activity     Days per week: None     Minutes per session: None    Stress: None   Relationships    Social connections     Talks on phone: None     Gets together: None     Attends Mandaen service: None     Active member of club or organization: None     Attends meetings of clubs or organizations: None     Relationship status: None    Intimate partner violence     Fear of current or ex partner: None     Emotionally abused: None     Physically abused: None     Forced sexual activity: None   Other Topics Concern    None   Social History Narrative    .  Works at the school district.   works at [employer].      She has 3 grown children.    [place] on [address]. Quit smoking 2012          CURRENT MEDICATIONS:   Current Outpatient Medications   Medication Sig Dispense Refill    albuterol (PROAIR HFA/PROVENTIL HFA/VENTOLIN HFA) 108 (90 Base) MCG/ACT inhaler INHALE 2 PUFFS BY MOUTH DAILY AS NEEDED 54 g 3    atorvastatin (LIPITOR) 20 MG tablet Take 1 tablet (20 mg) by mouth At Bedtime 180 tablet 3    clobetasol (TEMOVATE) 0.05 % external ointment       fluticasone-salmeterol (ADVAIR DISKUS) 250-50 MCG/ACT inhaler TWICE DAILY Strength: 250-50 MCG/DOSE 60 each 11    losartan-hydrochlorothiazide (HYZAAR) 50-12.5 MG tablet Take 1 tablet by mouth daily 180 tablet 3    metoprolol succinate ER (TOPROL  "XL) 25 MG 24 hr tablet Take 1 tablet (25 mg) by mouth daily 180 tablet 3    omeprazole (PRILOSEC) 20 MG DR capsule I capsule daily 180 capsule 3     No current facility-administered medications for this visit.       ALLERGIES:   Allergies   Allergen Reactions    Lisinopril      cough          ROS:   CONSTITUTIONAL: No reported fever or chills. No changes in weight.  ENT: No visual disturbance, ear ache, epistaxis or sore throat.   CARDIOVASCULAR: No recurrence of chest pains or chest pressure.  No recent palpitations. No increased lower extremity edema.  Positive for sharp pain specific to her left nipple, see HPI.  RESPIRATORY: Chronic dyspnea.  No cough, wheezing or hemoptysis.  No reports of orthopnea or PND.  GI: No reported abdominal pain, melena or hematochezia.  : No reported hematuria or dysuria.  NEUROLOGICAL: No lightheadedness, dizziness, syncope, ataxia, paresthesias or weakness.   HEMATOLOGIC: No history of anemia. No bleeding or excessive bruising. No history of blood clots.   MUSCULOSKELETAL: No new joint pain or swelling, no muscle pain.  ENDOCRINOLOGIC: No temperature intolerance. No hair or skin changes.  SKIN: No abnormal rashes or sores, no unusual itching.  PSYCHIATRIC: No history of depression or anxiety. No changes in mood, feeling down or anxious. No changes in sleep.      PHYSICAL EXAM:   /68 (BP Location: Right arm, Patient Position: Sitting, Cuff Size: Adult Large)   Pulse 78   Temp 97.7  F (36.5  C) (Temporal)   Resp 16   Ht 1.626 m (5' 4\")   Wt 82.4 kg (181 lb 9.6 oz)   LMP  (LMP Unknown)   SpO2 97%   BMI 31.17 kg/m    GENERAL: The patient is a well-developed, well-nourished, in no apparent distress.  HEENT: Head is normocephalic and atraumatic. Eyes are symmetrical with normal visual tracking. No icterus, no xanthelasmas. Nares appeared normal without nasal drainage. Mucous membranes are moist, no cyanosis.  NECK: Supple. No cervical bruits, JVP not visible.   CHEST/ " LUNGS: Lungs clear to auscultation, no rales, rhonchi or wheezes, no use of accessory muscles, no retractions, respirations unlabored and normal respiratory rate.   CARDIO: Regular rate and rhythm normal with S1 and S2, presence of grade III holosystolic murmur with radiation to carotids, no click or rub.   ABD: Abdomen is nondistended.   EXTREMITIES: No peripheral edema.  MUSCULOSKELETAL: No visible joint swelling.   NEUROLOGIC: Alert and oriented X3. Normal speech, gait and affect. No focal neurologic deficits.   SKIN: No jaundice. No rashes or visible skin lesions present. No ecchymosis.     LAB RESULTS:   Office Visit on 09/18/2023   Component Date Value Ref Range Status    Ventricular Rate 09/18/2023 57  BPM Final    Atrial Rate 09/18/2023 57  BPM Final    AZ Interval 09/18/2023 142  ms Final    QRS Duration 09/18/2023 88  ms Final    QT 09/18/2023 400  ms Final    QTc 09/18/2023 389  ms Final    P Axis 09/18/2023 42  degrees Final    R AXIS 09/18/2023 55  degrees Final    T Axis 09/18/2023 50  degrees Final    Interpretation ECG 09/18/2023    Final                    Value:Sinus bradycardia  Otherwise normal ECG  When compared with ECG of 17-MAY-2022 09:11,  T wave inversion no longer evident in Inferior leads  Confirmed by Dyllan Moore ([id number]) on 9/20/2023 2:07:45 PM      Hemoglobin A1C 09/18/2023 5.6  4.0 - 6.2 % Final    Cholesterol 09/18/2023 173  <200 mg/dL Final    Triglycerides 09/18/2023 121  <150 mg/dL Final    Direct Measure HDL 09/18/2023 75  >=50 mg/dL Final    LDL Cholesterol Calculated 09/18/2023 74  <=100 mg/dL Final    Non HDL Cholesterol 09/18/2023 98  <130 mg/dL Final    INR 09/18/2023 0.98  0.85 - 1.15 Final    Sodium 09/18/2023 139  136 - 145 mmol/L Final    Potassium 09/18/2023 4.3  3.4 - 5.3 mmol/L Final    Chloride 09/18/2023 102  98 - 107 mmol/L Final    Carbon Dioxide (CO2) 09/18/2023 25  22 - 29 mmol/L Final    Anion Gap 09/18/2023 12  7 - 15 mmol/L Final    Urea Nitrogen 09/18/2023  14.9  8.0 - 23.0 mg/dL Final    Creatinine 09/18/2023 0.72  0.51 - 0.95 mg/dL Final    Calcium 09/18/2023 9.6  8.8 - 10.2 mg/dL Final    Glucose 09/18/2023 116 (H)  70 - 99 mg/dL Final    Alkaline Phosphatase 09/18/2023 91  35 - 104 U/L Final    AST 09/18/2023 33  0 - 45 U/L Final    Reference intervals for this test were updated on 6/12/2023 to more accurately reflect our healthy population. There may be differences in the flagging of prior results with similar values performed with this method. Interpretation of those prior results can be made in the context of the updated reference intervals.    ALT 09/18/2023 47  0 - 50 U/L Final    Reference intervals for this test were updated on 6/12/2023 to more accurately reflect our healthy population. There may be differences in the flagging of prior results with similar values performed with this method. Interpretation of those prior results can be made in the context of the updated reference intervals.      Protein Total 09/18/2023 7.3  6.4 - 8.3 g/dL Final    Albumin 09/18/2023 4.5  3.5 - 5.2 g/dL Final    Bilirubin Total 09/18/2023 0.8  <=1.2 mg/dL Final    GFR Estimate 09/18/2023 >90  >60 mL/min/1.73m2 Final    WBC Count 09/18/2023 3.6 (L)  4.0 - 11.0 10e3/uL Final    RBC Count 09/18/2023 4.70  3.80 - 5.20 10e6/uL Final    Hemoglobin 09/18/2023 15.2  11.7 - 15.7 g/dL Final    Hematocrit 09/18/2023 44.3  35.0 - 47.0 % Final    MCV 09/18/2023 94  78 - 100 fL Final    MCH 09/18/2023 32.3  26.5 - 33.0 pg Final    MCHC 09/18/2023 34.3  31.5 - 36.5 g/dL Final    RDW 09/18/2023 12.7  10.0 - 15.0 % Final    Platelet Count 09/18/2023 92 (L)  150 - 450 10e3/uL Final         ASSESSMENT:   Charlene Huddleston presents for cardiology follow-up to visit on 10/5/22. She was initially evaluated by cardiology with equivocal stress test and racing heart palpitations.  She has a history of hypertension, hyperlipidemia, prediabetes, SLE, ongoing tobacco abuse, elevated liver enzymes,  hepatic steatosis and alcohol-related thrombocytopenia.  Patient does have a family history of structural heart disease, mitral disease in her father and sister.  Today she describes feeling a sharp pain specific to her left nipple. No drainage or other breast tenderness, no palpable lump, swelling or warmth at site.  No nipple discharge.  No precordial chest pains, no pain in the arm, jaw, neck or back.  Chronic dyspnea, largely unchanged.  No edema. No ETOH use since April 2021.      1. Essential hypertension  2. Sinus tachycardia  3. Prediabetes  4. Mixed hyperlipidemia  5. Chest pain, unspecified type  6. CORDOVA (dyspnea on exertion)  7. Tobacco dependence syndrome  8. Primary hypertension  9. Aortic valve calcification  10. Heart murmur      PLAN:   1. Racing heart palpitations resolved and BP well controlled on Toprol XL 25 mg daily, she will also continue on Losartan-hydrochlorothiazide 50-12.5 mg daily for HTN.  It is not suspected that her nipple discomfort is cardiac related, atypical symptoms.  She does have a scheduled visit with Dr. Molina due to the symptoms.  2. Previously reviewed recent stress echocardiogram on 5/4/2021 which was submaximal, only 80% of the maximum predicted heart rate achieved. No evidence of stress induced myocardial ischemia or past infarct. The LVEF was 55 to 60% at rest and increased appropriately to greater than 70% after exercise with appropriate decrease in LV cavity size.  Normal heart rate response to exercise.  Hypertensive response to exercise.  No anginal symptoms were endorsed during the study.  Doppler exam with no significant valvular abnormalities.  Her resting ECG revealed nonspecific ST and T wave changes.  Tracings throughout exercise without ectopy of significance.  There was some slight ST depression at almost 1 mm in the inferior and lateral leads with peak exercise.  In the recovery phase, no ectopy and continued ST changes were seen.  No evidence of  exercise-induced dysrhythmia.  Inconclusive EKG portion of the stress test for exercise-induced myocardial ischemia.  Due to her concerns for CAD, exertional dyspnea and multiple CAD risk factors, she will proceed with repeat stress echocardiogram.  3.  Repeat echocardiogram to follow-up on aortic sclerosis with audible cardiac murmur.  4.  Continue on atorvastatin 20 mg at bedtime.  5.  Follow-up with cardiology in 1 year, certainly sooner if needed.    Orders Placed This Encounter   Procedures    EKG 12-lead, tracing only    Echocardiogram Complete     Thank you for allowing me to participate in the care of your patient. Please do not hesitate to contact me if you have any questions.     Bianca Whitney, APRN CNP [facility]

## 2024-06-25 NOTE — PATIENT INSTRUCTIONS
You will receive a phone call to schedule stress test.   Follow-up with cardiology in one year, sooner if needed.

## 2024-06-25 NOTE — NURSING NOTE
"Chief Complaint   Patient presents with    Follow Up     Yearly follow up. Pt says she has developed pain in her left breast near the nipple. She was wondering if it was due to her heart.      Chief Complaint   Patient presents with    Follow Up     Yearly follow up. Pt says she has developed pain in her left breast near the nipple. She was wondering if it was due to her heart.        Initial /68 (BP Location: Right arm, Patient Position: Sitting, Cuff Size: Adult Large)   Pulse 78   Temp 97.7  F (36.5  C) (Temporal)   Resp 16   Ht 1.626 m (5' 4\")   Wt 82.4 kg (181 lb 9.6 oz)   LMP  (LMP Unknown)   SpO2 97%   BMI 31.17 kg/m   Estimated body mass index is 31.17 kg/m  as calculated from the following:    Height as of this encounter: 1.626 m (5' 4\").    Weight as of this encounter: 82.4 kg (181 lb 9.6 oz).  Meds Reconciled: complete  Pt is not on Aspirin  Pt is on a Statin  PHQ and/or SARAH reviewed. Pt referred to PCP/MH Provider as appropriate.    Aiden Davis       Initial /68 (BP Location: Right arm, Patient Position: Sitting, Cuff Size: Adult Large)   Pulse 78   Temp 97.7  F (36.5  C) (Temporal)   Resp 16   Ht 1.626 m (5' 4\")   Wt 82.4 kg (181 lb 9.6 oz)   LMP  (LMP Unknown)   SpO2 97%   BMI 31.17 kg/m   Estimated body mass index is 31.17 kg/m  as calculated from the following:    Height as of this encounter: 1.626 m (5' 4\").    Weight as of this encounter: 82.4 kg (181 lb 9.6 oz).  Medication Review: complete    The next two questions are to help us understand your food security.  If you are feeling you need any assistance in this area, we have resources available to support you today.          6/25/2024   SDOH- Food Insecurity   Within the past 12 months, did you worry that your food would run out before you got money to buy more? N   Within the past 12 months, did the food you bought just not last and you didn t have money to get more? N            Health Care Directive:  Patient " has a Health Care Directive on file      Aiden Davis

## 2024-07-08 ENCOUNTER — TELEPHONE (OUTPATIENT)
Dept: CARDIOLOGY | Facility: OTHER | Age: 64
End: 2024-07-08
Payer: COMMERCIAL

## 2024-07-08 NOTE — TELEPHONE ENCOUNTER
Patient verified .  Reminder call for stress test with instructions given. Emphasis on NO caffeine for 12 hours before test.  No food for 4 hours before.  Ok to have water and take all medications as usual.  Pt will bring her inhaler.  Patient verbalized understanding.

## 2024-07-09 ENCOUNTER — OFFICE VISIT (OUTPATIENT)
Dept: SURGERY | Facility: OTHER | Age: 64
End: 2024-07-09
Attending: SURGERY
Payer: COMMERCIAL

## 2024-07-09 VITALS
TEMPERATURE: 98 F | WEIGHT: 180 LBS | RESPIRATION RATE: 16 BRPM | OXYGEN SATURATION: 95 % | BODY MASS INDEX: 30.9 KG/M2 | SYSTOLIC BLOOD PRESSURE: 130 MMHG | HEART RATE: 76 BPM | DIASTOLIC BLOOD PRESSURE: 68 MMHG

## 2024-07-09 DIAGNOSIS — N64.4 BREAST PAIN, LEFT: Primary | ICD-10-CM

## 2024-07-09 DIAGNOSIS — N63.21 BREAST LUMP ON LEFT SIDE AT 2 O'CLOCK POSITION: ICD-10-CM

## 2024-07-09 PROCEDURE — 99203 OFFICE O/P NEW LOW 30 MIN: CPT | Performed by: SURGERY

## 2024-07-09 PROCEDURE — G0463 HOSPITAL OUTPT CLINIC VISIT: HCPCS

## 2024-07-09 ASSESSMENT — PAIN SCALES - GENERAL: PAINLEVEL: NO PAIN (0)

## 2024-07-09 NOTE — PROGRESS NOTES
Primary Care Physician: Elisa Doherty NP    I was requested to see this patient in consultation by Elisa Doherty NP for evaluation of left breast pain. A copy of this note will be sent to Elisa Doherty NP.    HPI:   The patient is 64 year old female with pain in her left breast. She has had this for more than a month. The pain is zinging pain. It happens randomly and is unrelated to activity or position. Sometimes it is worse with movement. It was happening up to 10 times a day. It lasts about 10 seconds or less. Over the last week, it hasn't happened. She has no nipple changes or nipple drainage bilaterally. She has not noted any new skin lesions on the breasts. She hasn't noted any new lumps or bumps. She has had her shingles vaccine.  No family history of breast cancer.   The patient hasn't had biopsy performed.       CONSULTATION ASSESSMENT AND PLAN/RECOMMENDATIONS: left breast pain and lump on exam  I discussed with the patient the pathophysiology of breast pain and breast disease. We specifically discussed that most pain is not related to breast cancer. We discussed options for treatment including warmth, NSAIDs, hormonal manipulation. I explained that good support is important in preventing breast pain. We discussed t performing diagnostic mammogram and US of the left breast . I recommend a follow up with me in the office after that to discuss results and plan. The patient's questions were answered.The patient expressed understanding. She will call with questions or concerns before follow up.    REVIEW OF SYSTEMS  GENERAL: No fevers or chills. Denies fatigue, recent weight loss.  HEENT: No sinus drainage. No changes with vision orhearing. No difficulty swallowing.   LYMPHATICS:  No swollen nodes in axilla, neck or groin.  CARDIOVASCULAR: Denies chest pain, palpitations and dyspnea on exertion.  PULMONARY: No shortness of breath or cough. Noincrease in sputum production.  GI: Denies  melena, bright red blood in stools. No hematemesis. No constipation or diarrhea.  : No dysuria or hematuria.  SKIN: No recent rashes or ulcers.   HEMATOLOGY:  No history of easy bruising or bleeding.  ENDOCRINE:  No history of diabetes or thyroid problems.  NEUROLOGY:  No history of seizures or headaches. No motor or sensory changes.  BREAST: as above  Past Medical History:   Diagnosis Date    Bacterial pneumonia 02/02/2010    Bitten by dog 09/2012    hospitalized    Chronic sinusitis     Chronic Sinusitis    Nonrheumatic mitral valve insufficiency 02/2005    Trace Mitral Regurgitation, Echo.  Does not require SBE    Other shoulder lesions, left shoulder     L shoulder RTC tendonitis    Personal history of other medical treatment (CODE)     prophylaxis    Personal history of other medical treatment (CODE) 03/11/2005    Echocardiogram 3/11/05 for family history of mitral valve and aortic valve replacement.  Trace mitral regurgitation.  Mitral valve appeared normal.  Tricuspid aortic valve without AI or AS.  No focal wall motion abnormalities.  Ejection fraction 72% 3/12/05.    Primary osteoarthritis of right knee     No Comments Provided    Tobacco use     quit 9/2012        Past Surgical History:   Procedure Laterality Date    ARTHROSCOPY KNEE Right 08/2001    Dr. 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    ARTHROSCOPY SHOULDER  2012    2011 / 2012,Left, supraspinatus repair    COLONOSCOPY  01/10/2013    hyperplastic, 1/10/23    COLONOSCOPY N/A 07/11/2023    1 small tubular adenoma, follow up 7/11/33    ECHOCARDIOGRAM INTRAOPERATIVE IN OR  03/11/2005    for family history of mitral valve and aortic valve replacement.  Trace mitral regurgitation.  Mitral valve appeared normal.  Tricuspid aortic valve without AI or AS.  No focal wall motion abnormalities.  Ejection fraction 72%    OTHER SURGICAL HISTORY  03/13/2006    PREMALIG/BENIGN SKIN LESION EXCISION,Excision of a benign verrucose  keratosis with actinic damage and lentiginous change.    OTHER SURGICAL HISTORY  12/2006    AL SODIUM HYALONURATE PER INJECTION,Synvisc injection    RELEASE TRIGGER FINGER N/A 05/27/2022    Procedure: Right Long (Middle) Trigger Finger Release;  Surgeon: Flako Reno MD;  Location: [facility] OR    TONSILLECTOMY      No Comments Provided    TOTAL KNEE ARTHROPLASTY Right        Current Outpatient Medications   Medication Sig Dispense Refill    albuterol (PROAIR HFA/PROVENTIL HFA/VENTOLIN HFA) 108 (90 Base) MCG/ACT inhaler INHALE 2 PUFFS BY MOUTH DAILY AS NEEDED 54 g 3    atorvastatin (LIPITOR) 20 MG tablet Take 1 tablet (20 mg) by mouth At Bedtime 180 tablet 3    clobetasol (TEMOVATE) 0.05 % external ointment       fluticasone-salmeterol (ADVAIR DISKUS) 250-50 MCG/ACT inhaler TWICE DAILY Strength: 250-50 MCG/DOSE 60 each 11    losartan-hydrochlorothiazide (HYZAAR) 50-12.5 MG tablet Take 1 tablet by mouth daily 180 tablet 3    metoprolol succinate ER (TOPROL XL) 25 MG 24 hr tablet Take 1 tablet (25 mg) by mouth daily 90 tablet 3    omeprazole (PRILOSEC) 20 MG DR capsule I capsule daily 180 capsule 3     No current facility-administered medications for this visit.       Allergies   Allergen Reactions    Lisinopril      cough       Family History   Problem Relation Age of Onset    Hyperlipidemia Mother         Hyperlipidemia,Hyperlipidemia    Other - See Comments Mother         Stroke,CVA 03/07    Heart Disease Mother         Heart Disease,aortic and mitral valve replacements, rheumatic fever    Mitral Valve Replacement Mother     Diabetes Father         Diabetes    Mitral Valve Replacement Father     Rheumatic fever Father     Diabetes Sister     Polycystic ovary syndrome Child     Thyroid Disease Child     Heart Disease Sister         Heart Disease,Valvular heart disease and diffuse athersclerosis on brain MRI    Other - See Comments Sister         rheumatic fever    Rheumatic fever Sister     Cerebrovascular Disease  Sister     Other - See Comments Maternal Grandmother         Stroke,CVA    Prostate Cancer Paternal Grandfather         Cancer-prostate    Hypertension Sister     Hyperlipidemia Sister        Social History     Socioeconomic History    Marital status:      Spouse name: None    Number of children: None    Years of education: None    Highest education level: None   Tobacco Use    Smoking status: Every Day     Current packs/day: 0.00     Average packs/day: 0.8 packs/day for 45.0 years (33.8 ttl pk-yrs)     Types: Cigarettes     Start date: 10/1/1967     Last attempt to quit: 10/1/2012     Years since quittin.7    Smokeless tobacco: Never   Vaping Use    Vaping status: Never Used   Substance and Sexual Activity    Alcohol use: Not Currently     Alcohol/week: 3.0 standard drinks of alcohol    Drug use: No    Sexual activity: Yes     Partners: Male   Social History Narrative    .  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      She has 3 grown children.    [place] [address]. Quit smoking 2012     Social Determinants of Health     Food Insecurity: Low Risk  (2024)    Food Insecurity     Within the past 12 months, did you worry that your food would run out before you got money to buy more?: No     Within the past 12 months, did the food you bought just not last and you didn t have money to get more?: No   Interpersonal Safety: Low Risk  (2024)    Interpersonal Safety     Do you feel physically and emotionally safe where you currently live?: Yes     Within the past 12 months, have you been hit, slapped, kicked or otherwise physically hurt by someone?: No     Within the past 12 months, have you been humiliated or emotionally abused in other ways by your partner or ex-partner?: No     The above history was reviewed and updated today, 2024  PHYSICAL EXAM  Vitals: /68 (BP Location: Right arm, Patient Position: Sitting, Cuff Size: Adult Regular)   Pulse 76   Temp 98   F (36.7  C) (Tympanic)   Resp 16   Wt 81.6 kg (180 lb)   LMP  (LMP Unknown)   SpO2 95%   BMI 30.90 kg/m    GENERAL: Healthy appearing patient in no acute distress. Pleasant and cooperative with exam and interview.   HEENT: Head-normocephalic. Eyes-no scleral icterus, pupils equal, round, andreactive to light. Nose-no nasal drainage. No lesions. Mouth-oral mucosa pink and moist, no lesions.  NECK: Supple. No thyroid nodules. Trachea midline.  LYMPHATICS:  No cervical, axillary or supraclavicular adenopathy.  CV: Regular rate and rhythm, no murmurs. No peripheral edema.  LUNGS:  No respiratory distress. Clear bilaterally to auscultation.  SKIN: Pink, warm and dry. No jaundice. No rash.  NEURO:  Cranial nerves II-XII grossly intact. Alert and oriented.  PSYCH: Appropriate mood and affect.  BREAST: Breasts were examined in the seated and supine position. Less than 1 cm tender mass noted left side 1-2 cm from the nipple. No mass on right. No nipple changes or discharge bilaterally. No diffuse tenderness noted.    IMAGING/LAB  I personally reviewed patient's 10/30/2023 screening mammogram images and report.

## 2024-07-09 NOTE — PATIENT INSTRUCTIONS
You will get a call to schedule US and mammogram. Susi will get you into my clinic after that to discuss the results and any recommendations.   Call for concerns before that .

## 2024-07-09 NOTE — NURSING NOTE
"Chief Complaint   Patient presents with    Consult     Left nipple pain       Initial /68 (BP Location: Right arm, Patient Position: Sitting, Cuff Size: Adult Regular)   Pulse 76   Temp 98  F (36.7  C) (Tympanic)   Resp 16   Wt 81.6 kg (180 lb)   LMP  (LMP Unknown)   SpO2 95%   BMI 30.90 kg/m   Estimated body mass index is 30.9 kg/m  as calculated from the following:    Height as of 6/25/24: 1.626 m (5' 4\").    Weight as of this encounter: 81.6 kg (180 lb).  Medication Reconciliation: complete    At what age did you start menopause? 47  What age did your menstrual cycle start? 11  Are you on or have you ever taken any hormone replacement or birth control? Birth control  How many children do you have? 3  How old were you when your first child was born? 24  Did you breast feed? yes  Do you have a family history of breast cancer? no  Jovanna Pat LPN..........7/9/2024  10:23 AM  "

## 2024-07-11 ENCOUNTER — HOSPITAL ENCOUNTER (OUTPATIENT)
Dept: NUCLEAR MEDICINE | Facility: OTHER | Age: 64
Discharge: HOME | End: 2024-07-11
Attending: NURSE PRACTITIONER
Payer: MEDICARE

## 2024-07-11 VITALS — HEIGHT: 64 IN | BODY MASS INDEX: 30.73 KG/M2 | WEIGHT: 180 LBS

## 2024-07-11 DIAGNOSIS — R07.9 CHEST PAIN, UNSPECIFIED TYPE: ICD-10-CM

## 2024-07-11 DIAGNOSIS — R06.09 DOE (DYSPNEA ON EXERTION): ICD-10-CM

## 2024-07-11 DIAGNOSIS — F17.200 TOBACCO DEPENDENCE SYNDROME: ICD-10-CM

## 2024-07-11 DIAGNOSIS — E78.2 MIXED HYPERLIPIDEMIA: ICD-10-CM

## 2024-07-11 DIAGNOSIS — I10 ESSENTIAL HYPERTENSION: ICD-10-CM

## 2024-07-11 LAB
CV BLOOD PRESSURE: 70 MMHG
CV STRESS MAX HR HE: 85
NUC STRESS EJECTION FRACTION: 85 %
RATE PRESSURE PRODUCT: NORMAL
STRESS ECHO BASELINE DIASTOLIC HE: 73
STRESS ECHO BASELINE HR: 56 BPM
STRESS ECHO BASELINE SYSTOLIC BP: 127
STRESS ECHO CALCULATED PERCENT HR: 55 %
STRESS ECHO LAST STRESS DIASTOLIC BP: 72
STRESS ECHO LAST STRESS SYSTOLIC BP: 133
STRESS ECHO POST ESTIMATED WORKLOAD: 1 METS
STRESS ECHO TARGET HR: 155

## 2024-07-11 PROCEDURE — 93018 CV STRESS TEST I&R ONLY: CPT | Performed by: INTERNAL MEDICINE

## 2024-07-11 PROCEDURE — 343N000001 HC RX 343: Performed by: NURSE PRACTITIONER

## 2024-07-11 PROCEDURE — A9500 TC99M SESTAMIBI: HCPCS | Performed by: NURSE PRACTITIONER

## 2024-07-11 PROCEDURE — 250N000011 HC RX IP 250 OP 636: Performed by: INTERNAL MEDICINE

## 2024-07-11 PROCEDURE — 93017 CV STRESS TEST TRACING ONLY: CPT

## 2024-07-11 PROCEDURE — 78452 HT MUSCLE IMAGE SPECT MULT: CPT | Mod: ME

## 2024-07-11 RX ORDER — REGADENOSON 0.08 MG/ML
0.4 INJECTION, SOLUTION INTRAVENOUS ONCE
Status: COMPLETED | OUTPATIENT
Start: 2024-07-11 | End: 2024-07-11

## 2024-07-11 RX ADMIN — KIT FOR THE PREPARATION OF TECHNETIUM TC99M SESTAMIBI 7.99 MILLICURIE: 1 INJECTION, POWDER, LYOPHILIZED, FOR SOLUTION PARENTERAL at 07:00

## 2024-07-11 RX ADMIN — REGADENOSON 0.4 MG: 0.08 INJECTION, SOLUTION INTRAVENOUS at 08:26

## 2024-07-11 RX ADMIN — KIT FOR THE PREPARATION OF TECHNETIUM TC99M SESTAMIBI 33.4 MILLICURIE: 1 INJECTION, POWDER, LYOPHILIZED, FOR SOLUTION PARENTERAL at 08:30

## 2024-07-11 NOTE — PROGRESS NOTES
0650 The patient arrived for a Lexiscan Cardiolite stress test.  The procedure, risks, and benefits were discussed with the patient ,and the consent was signed.  A saline lock was started,and the Cardiolite was injected by Nuclear Medicine.  The patient was taken to the waiting area, to await resting images at 0715.  0818 The patient returned from Nuclear Medicine and was prepped for the stress test.   Shania Freeman rn arrived, and the patient was administered the Lexiscan per procedure.  The patient tolerated the procedure.  She was given a snack and taken to Nuclear Medicine in stable condition for stress images.  The saline lock will be removed by Nuclear Medicine for proper disposal.  The patient was instructed that the ordering MD will call with results in one to two days.  Please see the chart for the complete test results.

## 2024-07-15 ENCOUNTER — HOSPITAL ENCOUNTER (OUTPATIENT)
Dept: CARDIOLOGY | Facility: OTHER | Age: 64
Discharge: HOME | End: 2024-07-15
Attending: NURSE PRACTITIONER | Admitting: NURSE PRACTITIONER
Payer: MEDICARE

## 2024-07-15 DIAGNOSIS — I35.9 AORTIC VALVE CALCIFICATION: ICD-10-CM

## 2024-07-15 DIAGNOSIS — R01.1 HEART MURMUR: ICD-10-CM

## 2024-07-15 LAB — LVEF ECHO: NORMAL

## 2024-07-15 PROCEDURE — 93306 TTE W/DOPPLER COMPLETE: CPT | Mod: 26 | Performed by: INTERNAL MEDICINE

## 2024-07-15 PROCEDURE — 93306 TTE W/DOPPLER COMPLETE: CPT

## 2024-07-23 ENCOUNTER — HOSPITAL ENCOUNTER (OUTPATIENT)
Dept: ULTRASOUND IMAGING | Facility: OTHER | Age: 64
Discharge: HOME | End: 2024-07-23
Attending: SURGERY
Payer: MEDICARE

## 2024-07-23 ENCOUNTER — HOSPITAL ENCOUNTER (OUTPATIENT)
Dept: MAMMOGRAPHY | Facility: OTHER | Age: 64
Discharge: HOME | End: 2024-07-23
Attending: SURGERY
Payer: MEDICARE

## 2024-07-23 DIAGNOSIS — N64.4 BREAST PAIN, LEFT: ICD-10-CM

## 2024-07-23 DIAGNOSIS — N63.21 BREAST LUMP ON LEFT SIDE AT 2 O'CLOCK POSITION: ICD-10-CM

## 2024-07-23 PROCEDURE — 77062 BREAST TOMOSYNTHESIS BI: CPT

## 2024-07-23 PROCEDURE — 76642 ULTRASOUND BREAST LIMITED: CPT | Mod: LT

## 2024-08-07 ENCOUNTER — HOSPITAL ENCOUNTER (OUTPATIENT)
Dept: MAMMOGRAPHY | Facility: OTHER | Age: 64
Discharge: HOME | End: 2024-08-07
Attending: SURGERY
Payer: MEDICARE

## 2024-08-07 ENCOUNTER — HOSPITAL ENCOUNTER (OUTPATIENT)
Dept: ULTRASOUND IMAGING | Facility: OTHER | Age: 64
Discharge: HOME | End: 2024-08-07
Attending: SURGERY
Payer: MEDICARE

## 2024-08-07 DIAGNOSIS — R92.8 ABNORMAL FINDING ON BREAST IMAGING: ICD-10-CM

## 2024-08-07 PROCEDURE — 88305 TISSUE EXAM BY PATHOLOGIST: CPT

## 2024-08-07 PROCEDURE — 272N000032 MA STEREOTACTIC BREAST BIOPSY VACUUM RT

## 2024-08-07 PROCEDURE — 999N000065 MA POST PROCEDURE RIGHT

## 2024-08-07 PROCEDURE — 272N000716 US BREAST BIOPSY CORE NEEDLE LEFT

## 2024-08-07 PROCEDURE — 76098 X-RAY EXAM SURGICAL SPECIMEN: CPT

## 2024-08-07 PROCEDURE — 250N000009 HC RX 250: Performed by: STUDENT IN AN ORGANIZED HEALTH CARE EDUCATION/TRAINING PROGRAM

## 2024-08-07 PROCEDURE — 19081 BX BREAST 1ST LESION STRTCTC: CPT | Mod: RT

## 2024-08-07 PROCEDURE — 999N000065 MA POST PROCEDURE LEFT

## 2024-08-07 RX ORDER — LIDOCAINE HYDROCHLORIDE 10 MG/ML
20 INJECTION, SOLUTION INFILTRATION; PERINEURAL ONCE
Status: COMPLETED | OUTPATIENT
Start: 2024-08-07 | End: 2024-08-07

## 2024-08-07 RX ORDER — LIDOCAINE HYDROCHLORIDE AND EPINEPHRINE 10; 10 MG/ML; UG/ML
20 INJECTION, SOLUTION INFILTRATION; PERINEURAL ONCE
Status: COMPLETED | OUTPATIENT
Start: 2024-08-07 | End: 2024-08-07

## 2024-08-07 RX ADMIN — LIDOCAINE HYDROCHLORIDE 2 ML: 10 INJECTION, SOLUTION EPIDURAL; INFILTRATION; INTRACAUDAL; PERINEURAL at 13:05

## 2024-08-07 RX ADMIN — LIDOCAINE HYDROCHLORIDE,EPINEPHRINE BITARTRATE 5 ML: 10; .01 INJECTION, SOLUTION INFILTRATION; PERINEURAL at 13:14

## 2024-08-07 RX ADMIN — LIDOCAINE HYDROCHLORIDE,EPINEPHRINE BITARTRATE 20 ML: 10; .01 INJECTION, SOLUTION INFILTRATION; PERINEURAL at 12:40

## 2024-08-07 RX ADMIN — LIDOCAINE HYDROCHLORIDE 3 ML: 10 INJECTION, SOLUTION EPIDURAL; INFILTRATION; INTRACAUDAL; PERINEURAL at 12:32

## 2024-08-07 NOTE — PROGRESS NOTES
Patient here for stereotactic guided biopsy of right breast and ultrasound breast biopsy of left breast.  Procedure reviewed with patient by writer and radiologist, questions answered.  Time out performed prior to biopsy. Stereotactic Biopsy completed by radiologist, clip placed.  Pressure held to biopsy site for 10 minutes.  Medipore dressing and steristrips  applied.   Post clip mammogram completed.  Ultrasound Biopsy completed by radiologist, clip placed.  Pressure held to biopsy site for 10 minutes.  Medipore dressing and steristrips  applied.   Post clip mammogram completed. Sports bra and ice pack applied over dressings.  Discharge instructions reviewed with patient, patient verbalizes understanding of instructions.  Discharged to home in stable condition with no evidence of bleeding from biopsy site.   Susi Parks RN.

## 2024-08-07 NOTE — DISCHARGE INSTRUCTIONS
"NEEDLE BIOPSY BREAST    Activity: Rest the remainder of the day. You may resume normal activity after the next day. Avoid any vigorous/strenuous physical activity for 24 hours.    Comfort: If you have discomfort or tenderness at the site you may take your usual or recommended pain medication. Do not take aspirin the day of the procedure or for 48 hours following the biopsy.    Diet: You may resume your usual diet.    Care of site: Leave ice pack in place for 4 hours, or until it is no longer cold. The ice pack is reusable and may be refrozen.  Keep your bra and the dressing on for 24 hours. Then you may remove the bandage and shower. If there are steri-strips you may remove them in 3 to 5 days.  You may have some discomfort and a small amount of bruising where the biopsy was performed. This is normal. For several days or even a couple of weeks, you may have tenderness or \"twinges\" and a tiny bump where the needle went into the skin. This can be bothersome, but is not abnormal. You can use warm moist washcloths, as this may help. Do Not Use A Heating Pad.    RETURN TO THE EMERGENCY ROOM FOR:   Shortness of breath   Rapid heart rate   If pain becomes worse    Call Your Doctor For:    A fever over 101 degrees   Increased redness, increased swelling, and/or persistent drainage/discomfort  around the site    Other: At the end of your breast biopsy, a tiny titanium clip will be inserted through the biopsy needle and placed at the biopsy site within your breast. The marker provides a landmark of the biopsy for further mammograms or surgical procedures. This marker is MRI compatible and poses no known health risks.    You will be receiving a letter in the mail from [facility] with your biopsy results.  In 6 months a mammogram will be needed to establish a new baseline and to recheck the area where the biopsy occurred. Our radiology department will call you to schedule an appointment.    For " questions, problems or concerns, contact the Radiology Department at [phone].

## 2024-08-08 ENCOUNTER — TELEPHONE (OUTPATIENT)
Dept: SURGERY | Facility: OTHER | Age: 64
End: 2024-08-08
Payer: COMMERCIAL

## 2024-08-08 NOTE — TELEPHONE ENCOUNTER
Called patient to check on status post  breast biopsy.  Patient reports pain 1/10.  Patient  using Tylenol with good relief.  Patient reports no bleeding.  Patient verbalizes understanding of importance of attending results appointment with Dr. Molina on 8/20 at 0840.  Susi Parks RN.

## 2024-08-09 LAB
PATH REPORT.COMMENTS IMP SPEC: NORMAL
PATH REPORT.FINAL DX SPEC: NORMAL
PHOTO IMAGE: NORMAL

## 2024-08-20 ENCOUNTER — OFFICE VISIT (OUTPATIENT)
Dept: SURGERY | Facility: OTHER | Age: 64
End: 2024-08-20
Attending: SURGERY
Payer: COMMERCIAL

## 2024-08-20 VITALS
SYSTOLIC BLOOD PRESSURE: 138 MMHG | TEMPERATURE: 98.1 F | WEIGHT: 180 LBS | DIASTOLIC BLOOD PRESSURE: 70 MMHG | BODY MASS INDEX: 30.9 KG/M2 | HEART RATE: 76 BPM | RESPIRATION RATE: 16 BRPM | OXYGEN SATURATION: 95 %

## 2024-08-20 DIAGNOSIS — D24.1 BREAST FIBROADENOMA, RIGHT: ICD-10-CM

## 2024-08-20 DIAGNOSIS — R92.8 ABNORMAL MAMMOGRAM OF RIGHT BREAST: Primary | ICD-10-CM

## 2024-08-20 DIAGNOSIS — N64.4 BREAST PAIN, LEFT: ICD-10-CM

## 2024-08-20 DIAGNOSIS — D24.2 BREAST FIBROADENOMA, LEFT: ICD-10-CM

## 2024-08-20 PROCEDURE — G0463 HOSPITAL OUTPT CLINIC VISIT: HCPCS

## 2024-08-20 PROCEDURE — 99203 OFFICE O/P NEW LOW 30 MIN: CPT | Performed by: SURGERY

## 2024-08-20 ASSESSMENT — PAIN SCALES - GENERAL: PAINLEVEL: MILD PAIN (2)

## 2024-08-20 NOTE — PROGRESS NOTES
Primary Care Physician: Elisa Doherty NP    I was requested to see this patient in consultation by Elisa Doherty NP for evaluation of left breast nodule and right breast calcifications. A copy of this note will be sent to Elisa Doherty NP.    HPI:   The patient is 64 year old female with changing calcifications noted in the right breast on recent mammogram and a new nodule in the left breast found on diagnostic US for breast pain. The patient hasn't noted any skin, nipple or breast changes. No previous breast cancer. No previous breast biopsy. No family history of breast cancer. The patient had biopsy performed that showed fibroadenoma at both sites. The right breast fibroadenoma contained calcifications. No atypia or malignancy was noted on either exam.      CONSULTATION ASSESSMENT AND PLAN/RECOMMENDATIONS: Fibroadenoma bilateral breasts  I discussed with the patient the pathophysiology of breast nodules and breast disease. We specifically discussed that most abnormalities seen on mammogram and US are not breast cancer. I explained that fibroadenomas are not a precancerous condition and that they don't turn into breast cancer. I recommended a 6 month bilateral breast mammogram to follow up breast changes after the recent biopsy. She will have this exam in March when they return from their winter travel. The patient expressed understanding and denies further questions. The patient will call with questions or concerns.     REVIEW OF SYSTEMS  GENERAL: No fevers or chills. Denies fatigue, recent weight loss.  HEENT: No sinus drainage. No changes with vision or hearing. No difficulty swallowing.   LYMPHATICS:  No swollen nodes in axilla, neck or groin.  CARDIOVASCULAR: Denies chest pain, palpitations and dyspnea on exertion.  PULMONARY: No increased shortness of breath or cough. No increase in sputum production.  GI:Denies melena, bright red blood in stools. No hematemesis. No constipation or  diarrhea.  : No dysuria or hematuria.  SKIN: No recent rashes or ulcers.   HEMATOLOGY:  No history of easy bruising or bleeding.  ENDOCRINE:  No history of diabetes or thyroid problems.  NEUROLOGY:  No history of seizures or headaches. No motor or sensory changes.  BREAST:  as above.    Past Medical History:   Diagnosis Date    Bacterial pneumonia 02/02/2010    Bitten by dog 09/2012    hospitalized    Chronic sinusitis     Chronic Sinusitis    Nonrheumatic mitral valve insufficiency 02/2005    Trace Mitral Regurgitation, Echo.  Does not require SBE    Other shoulder lesions, left shoulder     L shoulder RTC tendonitis    Personal history of other medical treatment (CODE)     prophylaxis    Personal history of other medical treatment (CODE) 03/11/2005    Echocardiogram 3/11/05 for family history of mitral valve and aortic valve replacement.  Trace mitral regurgitation.  Mitral valve appeared normal.  Tricuspid aortic valve without AI or AS.  No focal wall motion abnormalities.  Ejection fraction 72% 3/12/05.    Primary osteoarthritis of right knee     No Comments Provided    Tobacco use     quit 9/2012       Past Surgical History:   Procedure Laterality Date    ARTHROSCOPY KNEE Right 08/2001    Dr. 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    ARTHROSCOPY SHOULDER  2012    2011 / 2012,Left, supraspinatus repair    COLONOSCOPY  01/10/2013    hyperplastic, 1/10/23    COLONOSCOPY N/A 07/11/2023    1 small tubular adenoma, follow up 7/11/33    ECHOCARDIOGRAM INTRAOPERATIVE IN OR  03/11/2005    for family history of mitral valve and aortic valve replacement.  Trace mitral regurgitation.  Mitral valve appeared normal.  Tricuspid aortic valve without AI or AS.  No focal wall motion abnormalities.  Ejection fraction 72%    OTHER SURGICAL HISTORY  03/13/2006    PREMALIG/BENIGN SKIN LESION EXCISION,Excision of a benign verrucose keratosis with actinic damage and lentiginous change.    OTHER SURGICAL  HISTORY  12/2006    RI SODIUM HYALONURATE PER INJECTION,Synvisc injection    RELEASE TRIGGER FINGER N/A 05/27/2022    Procedure: Right Long (Middle) Trigger Finger Release;  Surgeon: Flako Reno MD;  Location: [facility] OR    TONSILLECTOMY      No Comments Provided    TOTAL KNEE ARTHROPLASTY Right        Current Outpatient Medications   Medication Sig Dispense Refill    albuterol (PROAIR HFA/PROVENTIL HFA/VENTOLIN HFA) 108 (90 Base) MCG/ACT inhaler INHALE 2 PUFFS BY MOUTH DAILY AS NEEDED 54 g 3    atorvastatin (LIPITOR) 20 MG tablet Take 1 tablet (20 mg) by mouth At Bedtime 180 tablet 3    clobetasol (TEMOVATE) 0.05 % external ointment       fluticasone-salmeterol (ADVAIR DISKUS) 250-50 MCG/ACT inhaler TWICE DAILY Strength: 250-50 MCG/DOSE 60 each 11    losartan-hydrochlorothiazide (HYZAAR) 50-12.5 MG tablet Take 1 tablet by mouth daily 180 tablet 3    metoprolol succinate ER (TOPROL XL) 25 MG 24 hr tablet Take 1 tablet (25 mg) by mouth daily 90 tablet 3    omeprazole (PRILOSEC) 20 MG DR capsule I capsule daily 180 capsule 3     No current facility-administered medications for this visit.       Allergies   Allergen Reactions    Lisinopril      cough       Family History   Problem Relation Age of Onset    Hyperlipidemia Mother         Hyperlipidemia,Hyperlipidemia    Other - See Comments Mother         Stroke,CVA 03/07    Heart Disease Mother         Heart Disease,aortic and mitral valve replacements, rheumatic fever    Mitral Valve Replacement Mother     Diabetes Father         Diabetes    Mitral Valve Replacement Father     Rheumatic fever Father     Diabetes Sister     Polycystic ovary syndrome Child     Thyroid Disease Child     Heart Disease Sister         Heart Disease,Valvular heart disease and diffuse athersclerosis on brain MRI    Other - See Comments Sister         rheumatic fever    Rheumatic fever Sister     Cerebrovascular Disease Sister     Other - See Comments Maternal Grandmother         Stroke,CVA     Prostate Cancer Paternal Grandfather         Cancer-prostate    Hypertension Sister     Hyperlipidemia Sister        Social History     Socioeconomic History    Marital status:      Spouse name: None    Number of children: None    Years of education: None    Highest education level: None   Tobacco Use    Smoking status: Every Day     Current packs/day: 0.00     Average packs/day: 0.8 packs/day for 45.0 years (33.8 ttl pk-yrs)     Types: Cigarettes     Start date: 10/1/1967     Last attempt to quit: 10/1/2012     Years since quittin.8    Smokeless tobacco: Never   Vaping Use    Vaping status: Never Used   Substance and Sexual Activity    Alcohol use: Not Currently     Alcohol/week: 3.0 standard drinks of alcohol    Drug use: No    Sexual activity: Yes     Partners: Male   Social History Narrative    .  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      She has 3 grown children.    Lives on [address]. Quit smoking 2012     Social Determinants of Health     Food Insecurity: Low Risk  (2024)    Food Insecurity     Within the past 12 months, did you worry that your food would run out before you got money to buy more?: No     Within the past 12 months, did the food you bought just not last and you didn t have money to get more?: No   Interpersonal Safety: Low Risk  (2024)    Interpersonal Safety     Do you feel physically and emotionally safe where you currently live?: Yes     Within the past 12 months, have you been hit, slapped, kicked or otherwise physically hurt by someone?: No     Within the past 12 months, have you been humiliated or emotionally abused in other ways by your partner or ex-partner?: No     The above history was reviewed today, 2024    PHYSICAL EXAM  Vitals: /70 (BP Location: Right arm, Patient Position: Sitting, Cuff Size: Adult Regular)   Pulse 76   Temp 98.1  F (36.7  C) (Tympanic)   Resp 16   Wt 81.6 kg (180 lb)   LMP  (LMP Unknown)    SpO2 95%   BMI 30.90 kg/m    GENERAL: Healthy appearing patient in no acute distress. Pleasant and cooperative with exam and interview.   HEENT: Head-normocephalic. Eyes-no scleral icterus. Nose-no nasal drainage. No lesions. Mouth-oral mucosa pink and moist, no lesions.  NECK: Supple. No thyroid nodules. Trachea midline.  LYMPHATICS:  No cervical, axillary or supraclavicular adenopathy.  CV: Regular rate and rhythm, + murmur. No peripheral edema.  LUNGS:  No respiratory distress. Clear bilaterally to auscultation.  ABDOMEN: Non distended. Bowel sounds active. Soft, non-tender, no hepatosplenomegaly or hernias. No peritoneal signs.  SKIN: Pink, warm and dry. No jaundice. No rash.  NEURO:  Cranial nerves II-XII grossly intact. Alert and oriented.  PSYCH: Appropriate mood and affect.  BREAST: Breasts were examined in theseated and supine position. No mass noted bilaterally. No nipple changes or discharge bilaterally. Post biopsy changes noted bilateral breasts. Resolving ecchymosis with no sign of infection. Appropriate tenderness noted.    IMAGING/LAB  I personally reviewed patient's recent mammogram, US and biopsy images and report and pathology report.

## 2024-08-20 NOTE — NURSING NOTE
"Chief Complaint   Patient presents with    RECHECK     Left breast       Initial /70 (BP Location: Right arm, Patient Position: Sitting, Cuff Size: Adult Regular)   Pulse 76   Temp 98.1  F (36.7  C) (Tympanic)   Resp 16   Wt 81.6 kg (180 lb)   LMP  (LMP Unknown)   SpO2 95%   BMI 30.90 kg/m   Estimated body mass index is 30.9 kg/m  as calculated from the following:    Height as of 7/11/24: 1.626 m (5' 4\").    Weight as of this encounter: 81.6 kg (180 lb).  Medication Reconciliation: complete    Jovanna Pat LPN  "

## 2024-08-21 NOTE — PATIENT INSTRUCTIONS
You will be due for a bilateral mammogram in about 6 months, ok to do when you get back to [place] in March.   If you have a question or concern before that time-please call or send a message!

## 2024-10-03 DIAGNOSIS — J45.20 MILD INTERMITTENT ASTHMA WITHOUT COMPLICATION: ICD-10-CM

## 2024-10-03 RX ORDER — ALBUTEROL SULFATE 90 UG/1
INHALANT RESPIRATORY (INHALATION)
Qty: 54 G | Refills: 3 | Status: CANCELLED | OUTPATIENT
Start: 2024-10-03

## 2024-10-07 ENCOUNTER — OFFICE VISIT (OUTPATIENT)
Dept: INTERNAL MEDICINE | Facility: OTHER | Age: 64
End: 2024-10-07
Attending: NURSE PRACTITIONER
Payer: MEDICARE

## 2024-10-07 VITALS
SYSTOLIC BLOOD PRESSURE: 128 MMHG | RESPIRATION RATE: 16 BRPM | OXYGEN SATURATION: 98 % | WEIGHT: 179.2 LBS | BODY MASS INDEX: 31.75 KG/M2 | HEART RATE: 71 BPM | DIASTOLIC BLOOD PRESSURE: 72 MMHG | HEIGHT: 63 IN | TEMPERATURE: 97.8 F

## 2024-10-07 DIAGNOSIS — K75.81 LIVER CIRRHOSIS SECONDARY TO NASH (H): ICD-10-CM

## 2024-10-07 DIAGNOSIS — Z87.891 PERSONAL HISTORY OF NICOTINE DEPENDENCE: ICD-10-CM

## 2024-10-07 DIAGNOSIS — R73.03 PREDIABETES: ICD-10-CM

## 2024-10-07 DIAGNOSIS — E78.2 MIXED HYPERLIPIDEMIA: ICD-10-CM

## 2024-10-07 DIAGNOSIS — Z87.891 PERSONAL HISTORY OF TOBACCO USE: ICD-10-CM

## 2024-10-07 DIAGNOSIS — Z78.0 POSTMENOPAUSAL STATUS: ICD-10-CM

## 2024-10-07 DIAGNOSIS — K74.60 LIVER CIRRHOSIS SECONDARY TO NASH (H): ICD-10-CM

## 2024-10-07 DIAGNOSIS — Z00.00 MEDICARE WELCOME VISIT: Primary | ICD-10-CM

## 2024-10-07 DIAGNOSIS — J45.20 MILD INTERMITTENT ASTHMA WITHOUT COMPLICATION: ICD-10-CM

## 2024-10-07 DIAGNOSIS — F17.200 NICOTINE USE DISORDER: ICD-10-CM

## 2024-10-07 DIAGNOSIS — M32.9 SYSTEMIC LUPUS ERYTHEMATOSUS, UNSPECIFIED SLE TYPE, UNSPECIFIED ORGAN INVOLVEMENT STATUS (H): ICD-10-CM

## 2024-10-07 DIAGNOSIS — Z23 NEED FOR PROPHYLACTIC VACCINATION AND INOCULATION AGAINST INFLUENZA: ICD-10-CM

## 2024-10-07 DIAGNOSIS — Z13.220 SCREENING FOR HYPERLIPIDEMIA: ICD-10-CM

## 2024-10-07 DIAGNOSIS — I10 PRIMARY HYPERTENSION: ICD-10-CM

## 2024-10-07 DIAGNOSIS — K21.9 GASTROESOPHAGEAL REFLUX DISEASE WITHOUT ESOPHAGITIS: ICD-10-CM

## 2024-10-07 DIAGNOSIS — Z23 ENCOUNTER FOR IMMUNIZATION: ICD-10-CM

## 2024-10-07 DIAGNOSIS — R19.5 LOOSE STOOLS: ICD-10-CM

## 2024-10-07 DIAGNOSIS — D69.6 THROMBOCYTOPENIA (H): ICD-10-CM

## 2024-10-07 DIAGNOSIS — R91.8 PULMONARY NODULES: ICD-10-CM

## 2024-10-07 DIAGNOSIS — Z12.2 SCREENING FOR LUNG CANCER: ICD-10-CM

## 2024-10-07 LAB
CHOLEST SERPL-MCNC: 157 MG/DL
EST. AVERAGE GLUCOSE BLD GHB EST-MCNC: 108 MG/DL
FASTING STATUS PATIENT QL REPORTED: YES
HBA1C MFR BLD: 5.4 %
HDLC SERPL-MCNC: 74 MG/DL
LDLC SERPL CALC-MCNC: 60 MG/DL
NONHDLC SERPL-MCNC: 83 MG/DL
TRIGL SERPL-MCNC: 113 MG/DL
TSH SERPL DL<=0.005 MIU/L-ACNC: 2.74 UIU/ML (ref 0.3–4.2)

## 2024-10-07 PROCEDURE — 84443 ASSAY THYROID STIM HORMONE: CPT | Mod: ZL | Performed by: NURSE PRACTITIONER

## 2024-10-07 PROCEDURE — G0008 ADMIN INFLUENZA VIRUS VAC: HCPCS

## 2024-10-07 PROCEDURE — 99406 BEHAV CHNG SMOKING 3-10 MIN: CPT | Performed by: NURSE PRACTITIONER

## 2024-10-07 PROCEDURE — 90480 ADMN SARSCOV2 VAC 1/ONLY CMP: CPT

## 2024-10-07 PROCEDURE — 99214 OFFICE O/P EST MOD 30 MIN: CPT | Mod: 25 | Performed by: NURSE PRACTITIONER

## 2024-10-07 PROCEDURE — 36415 COLL VENOUS BLD VENIPUNCTURE: CPT | Mod: ZL | Performed by: NURSE PRACTITIONER

## 2024-10-07 PROCEDURE — 91320 SARSCV2 VAC 30MCG TRS-SUC IM: CPT

## 2024-10-07 PROCEDURE — 80061 LIPID PANEL: CPT | Mod: ZL | Performed by: NURSE PRACTITIONER

## 2024-10-07 PROCEDURE — G0463 HOSPITAL OUTPT CLINIC VISIT: HCPCS | Mod: 25

## 2024-10-07 PROCEDURE — G0296 VISIT TO DETERM LDCT ELIG: HCPCS | Performed by: NURSE PRACTITIONER

## 2024-10-07 PROCEDURE — G0402 INITIAL PREVENTIVE EXAM: HCPCS | Performed by: NURSE PRACTITIONER

## 2024-10-07 PROCEDURE — 83036 HEMOGLOBIN GLYCOSYLATED A1C: CPT | Mod: ZL | Performed by: NURSE PRACTITIONER

## 2024-10-07 RX ORDER — ALBUTEROL SULFATE 90 UG/1
INHALANT RESPIRATORY (INHALATION)
Qty: 54 G | Refills: 3 | Status: SHIPPED | OUTPATIENT
Start: 2024-10-07

## 2024-10-07 RX ORDER — METOPROLOL SUCCINATE 25 MG/1
25 TABLET, EXTENDED RELEASE ORAL DAILY
Qty: 90 TABLET | Refills: 4 | Status: SHIPPED | OUTPATIENT
Start: 2024-10-07

## 2024-10-07 RX ORDER — LOSARTAN POTASSIUM AND HYDROCHLOROTHIAZIDE 12.5; 5 MG/1; MG/1
1 TABLET ORAL DAILY
Qty: 90 TABLET | Refills: 4 | Status: SHIPPED | OUTPATIENT
Start: 2024-10-07

## 2024-10-07 RX ORDER — ATORVASTATIN CALCIUM 20 MG/1
20 TABLET, FILM COATED ORAL AT BEDTIME
Qty: 90 TABLET | Refills: 4 | Status: SHIPPED | OUTPATIENT
Start: 2024-10-07

## 2024-10-07 RX ORDER — FLUTICASONE PROPIONATE AND SALMETEROL 250; 50 UG/1; UG/1
POWDER RESPIRATORY (INHALATION)
Qty: 60 EACH | Refills: 11 | Status: SHIPPED | OUTPATIENT
Start: 2024-10-07

## 2024-10-07 RX ORDER — FLUTICASONE PROPIONATE AND SALMETEROL 250; 50 UG/1; UG/1
POWDER RESPIRATORY (INHALATION)
Refills: 11 | OUTPATIENT
Start: 2024-10-07

## 2024-10-07 SDOH — HEALTH STABILITY: PHYSICAL HEALTH: ON AVERAGE, HOW MANY DAYS PER WEEK DO YOU ENGAGE IN MODERATE TO STRENUOUS EXERCISE (LIKE A BRISK WALK)?: 4 DAYS

## 2024-10-07 ASSESSMENT — ASTHMA QUESTIONNAIRES
QUESTION_4 LAST FOUR WEEKS HOW OFTEN HAVE YOU USED YOUR RESCUE INHALER OR NEBULIZER MEDICATION (SUCH AS ALBUTEROL): TWO OR THREE TIMES PER WEEK
QUESTION_1 LAST FOUR WEEKS HOW MUCH OF THE TIME DID YOUR ASTHMA KEEP YOU FROM GETTING AS MUCH DONE AT WORK, SCHOOL OR AT HOME: NONE OF THE TIME
ACT_TOTALSCORE: 18
QUESTION_3 LAST FOUR WEEKS HOW OFTEN DID YOUR ASTHMA SYMPTOMS (WHEEZING, COUGHING, SHORTNESS OF BREATH, CHEST TIGHTNESS OR PAIN) WAKE YOU UP AT NIGHT OR EARLIER THAN USUAL IN THE MORNING: TWO OR THREE NIGHTS A WEEK
QUESTION_2 LAST FOUR WEEKS HOW OFTEN HAVE YOU HAD SHORTNESS OF BREATH: NOT AT ALL
QUESTION_5 LAST FOUR WEEKS HOW WOULD YOU RATE YOUR ASTHMA CONTROL: SOMEWHAT CONTROLLED
ACT_TOTALSCORE: 18

## 2024-10-07 ASSESSMENT — SOCIAL DETERMINANTS OF HEALTH (SDOH): HOW OFTEN DO YOU GET TOGETHER WITH FRIENDS OR RELATIVES?: TWICE A WEEK

## 2024-10-07 ASSESSMENT — PAIN SCALES - GENERAL: PAINLEVEL: NO PAIN (0)

## 2024-10-07 NOTE — TELEPHONE ENCOUNTER
[id number] of [place] sent Rx request for the following:      fluticasone-salmeterol (ADVAIR DISKUS) 250-50 MCG/ACT [id number]/7/2024--NoSig: TWICE DAILY Strength: 250-50 MCG/DOSESent to pharmacy as: Fluticasone-Salmeterol 250-50 MCG/ACT Inhalation Aerosol Powder Breath ActivatedClass: E-PrescribeEarliest Fill Date: 10/7/2024Notes to Pharmacy: Prescription renewal, patient will call for refills.Order: [id number] Status: Receipt confirmed by pharmacy (10/7/2024  9:59 AM CDT)     Duplicate    Donna Holt RN on 10/7/2024 at 4:14 PM

## 2024-10-07 NOTE — NURSING NOTE
"Chief Complaint   Patient presents with    Wellness Visit       FOOD SECURITY SCREENING QUESTIONS  Hunger Vital Signs:  Within the past 12 months we worried whether our food would run out before we got money to buy more. Never  Within the past 12 months the food we bought just didn't last and we didn't have money to get more. Never  Annemarie White RN 10/7/2024 9:31 AM      Initial /72 (BP Location: Right arm, Patient Position: Sitting, Cuff Size: Adult Large)   Pulse 71   Temp 97.8  F (36.6  C) (Tympanic)   Resp 16   Ht 1.6 m (5' 3\")   Wt 81.3 kg (179 lb 3.2 oz)   LMP  (LMP Unknown)   SpO2 98%   BMI 31.74 kg/m   Estimated body mass index is 31.74 kg/m  as calculated from the following:    Height as of this encounter: 1.6 m (5' 3\").    Weight as of this encounter: 81.3 kg (179 lb 3.2 oz).  Medication Reconciliation: complete    Annemarie White RN    "

## 2024-10-07 NOTE — PROGRESS NOTES
Lung Cancer Screening Shared Decision Making Visit     Charleen Huddleston, a 64 year old female, is eligible for lung cancer screening    History   Smoking Status     Every Day     Types: Cigarettes   Smokeless Tobacco     Never       I have discussed with patient the risks and benefits of screening for lung cancer with low-dose CT.     The risks include:    radiation exposure: one low dose chest CT has as much ionizing radiation as about 15 chest x-rays, or 6 months of background radiation living in [place]      false positives: most findings/nodules are NOT cancer, but some might still require additional diagnostic evaluation, including biopsy    over-diagnosis: some slow growing cancers that might never have been clinically significant will be detected and treated unnecessarily     The benefit of early detection of lung cancer is contingent upon adherence to annual screening or more frequent follow up if indicated.     Furthermore, to benefit from screening, Charleen must be willing and able to undergo diagnostic procedures, if indicated. Although no specific guide is available for determining severity of comorbidities, it is reasonable to withhold screening in patients who have greater mortality risk from other diseases.     We did discuss that the best way to prevent lung cancer is to not smoke.    Some patients may value a numeric estimation of lung cancer risk when evaluating if lung cancer screening is right for them, here is one calculator:    ShouldIScreen

## 2024-10-07 NOTE — PROGRESS NOTES
Preventive Care Visit  [facility]  Elisa Doherty NP, Internal Medicine  Oct 7, 2024        ICD-10-CM    1. Medicare welcome visit  Z00.00       2. Mixed hyperlipidemia  E78.2 atorvastatin (LIPITOR) 20 MG tablet     TSH with free T4 reflex      3. Primary hypertension  I10 losartan-hydrochlorothiazide (HYZAAR) 50-12.5 MG tablet     metoprolol succinate ER (TOPROL XL) 25 MG 24 hr tablet      4. Mild intermittent asthma without complication  J45.20 fluticasone-salmeterol (ADVAIR DISKUS) 250-50 MCG/ACT inhaler     albuterol (PROAIR HFA/PROVENTIL HFA/VENTOLIN HFA) 108 (90 Base) MCG/ACT inhaler      5. Gastroesophageal reflux disease without esophagitis  K21.9 omeprazole (PRILOSEC) 20 MG DR capsule      6. Encounter for immunization  Z23 COVID-19 12+ (PFIZER)      7. Screening for hyperlipidemia  Z13.220 Lipid Profile      8. Nicotine use disorder  F17.200 SMOKING CESSATION COUNSELING 3-10 MIN      9. 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 Thrombocytopenia (H)  D69.6       11. Systemic lupus erythematosus, unspecified SLE type, unspecified organ involvement status (H)  M32.9       12. Pulmonary nodules  R91.8       13. Prediabetes  R73.03       14. Loose stools  R19.5       15. Postmenopausal status  Z78.0 DEXA HIP/PELVIS/SPINE - Future      16. Screening for lung cancer  Z12.2 CT Chest Lung Cancer Screen Low Dose Without      17. Personal history of tobacco use  Z87.891 Prof fee: Shared Decision Making for Lung Cancer Screening     CANCELED: CT Chest Lung Cancer Scrn Low Dose wo      18. Personal history of nicotine dependence  Z87.891 CT Chest Lung Cancer Screen Low Dose Without      19. Need for prophylactic vaccination and inoculation against influenza  Z23 INFLUENZA HIGH DOSE, TRIVALENT, PF (FLUZONE)         Plan:  -Medicare wellness visit complete.  Follow-up annually.  Fasting labs ordered.  Also will check TSH due to reports of loose stools.  She has had  negative stool testing by her report.  She is also followed by gastroenterology and has a follow-up appointment next week.  She will discuss this at gastroenterology appointment.  -Blood pressure well-controlled with current medication.  Recent echocardiogram showed no valvular heart disease.  Has history of chronic murmur.  Medication refills completed.  -Bone density scan and lung CT ordered.  3 minutes of time spent on tobacco cessation, she is not interested in additional assistance at this time.  She does have nicotine patches at home.  -Continue to follow with gastroenterology for THOMAS and loose stools  -WBC slowly increasing.  [place] by gastroenterologist to be a result of THOMAS.  -A1c obtained for history of prediabetes.  -Influenza and COVID-19 vaccine provided today.  Recommended she get RSV vaccine within the next 2-4 weeks.  -Remote history of lupus not currently active.      Anali Villaseñor is a 64 year old, presenting for the following:  Wellness Visit        10/7/2024     9:21 AM   Additional Questions   Roomed by Annemarie SADLER        Health Care Directive  Patient does not have a Health Care Directive or Living Will: Discussed advance care planning with patient; information given to patient to review.    HPI  Patient is here today for Medicare wellness visit and chronic disease management.  She usually lassiter in the [facility] he will be leaving in the near future.  She is followed by gastroenterology for THOMAS and also chronic diarrhea.  She states her diarrhea began last year after colonoscopy.  She was having about 30 watery greasy stools per day.  Gastroenterology had her change her diet to low-fat diet and those greasy stools are now down to approximately 7 times per day.  She had negative stool studies completed.  White count and platelets have been low intermittently.  It has been felt by gastroenterology that this was related to the THOMAS according to patient.  She was also seen by  cardiology for hypertension.  She had echocardiogram obtained.  No new valvular disease.  Has chronic murmur which was diagnosed when she was pregnant with her daughter.  Continues to use tobacco and not interested in tobacco cessation.  She has nicotine patches at home.  She is due for lung cancer screening next month.  Also history of prediabetes and HLD, on statin due for labs.  Due for vaccine update.    Wellness Visit Notes:    -Mammo done 7/23/24 (impression: 4 suspicious abnormality).  Follow up March 2025    -DEXA done Never     -Colon cancer screening done via colonoscopy on 7/21/23   (impression: Tubular adenoma). Follow up in 10 years    -Immunizations: Patient is due for the following: RSV, flu and covid    -Derm: Does patient regularly see dermatologist? yearly    -Refills pended for requested medications.  -Labs pended.   -Education provided on diet, exeresis, and safety at home        10/7/2024   General Health   How would you rate your overall physical health? Good   Feel stress (tense, anxious, or unable to sleep) Not at all            10/7/2024   Nutrition   Diet: Regular (no restrictions)            10/7/2024   Exercise   Days per week of moderate/strenous exercise 4 days            10/7/2024   Social Factors   Frequency of gathering with friends or relatives Twice a week   Worry food won't last until get money to buy more No   Food not last or not have enough money for food? No   Do you have housing? (Housing is defined as stable permanent housing and does not include staying ouside in a car, in a tent, in an abandoned building, in an overnight shelter, or couch-surfing.) Yes   Are you worried about losing your housing? No   Lack of transportation? No   Unable to get utilities (heat,electricity)? No            10/7/2024   Fall Risk   Fallen 2 or more times in the past year? No    No   Trouble with walking or balance? No    No       Multiple values from one day are sorted in reverse-chronological  order          10/7/2024   Activities of Daily Living- Home Safety   Needs help with the following daily activites None of the above   Safety concerns in the home None of the above            10/7/2024   Dental   Dentist two times every year? Yes            10/7/2024   Hearing Screening   Hearing concerns? None of the above            10/7/2024   Driving Risk Screening   Patient/family members have concerns about driving No            10/7/2024   General Alertness/Fatigue Screening   Have you been more tired than usual lately? No            10/7/2024   Urinary Incontinence Screening   Bothered by leaking urine in past 6 months No            10/7/2024   TB Screening   Were you born outside of the [place]? No            Today's PHQ-2 Score:       10/7/2024     9:16 AM   PHQ-2 (  [employer])   Q1: Little interest or pleasure in doing things 0   Q2: Feeling down, depressed or hopeless 0   PHQ-2 Score 0   Q1: Little interest or pleasure in doing things Not at all   Q2: Feeling down, depressed or hopeless Not at all   PHQ-2 Score 0           10/7/2024   Substance Use   Alcohol more than 3/day or more than 7/wk No   Do you have a current opioid prescription? No   How severe/bad is pain from 1 to 10? 0/10 (No Pain)   Do you use any other substances recreationally? No        Social History     Tobacco Use    Smoking status: Every Day     Current packs/day: 0.00     Average packs/day: 0.8 packs/day for 45.0 years (33.8 ttl pk-yrs)     Types: Cigarettes     Start date: 10/1/1967     Last attempt to quit: 10/1/2012     Years since quittin.0    Smokeless tobacco: Never   Vaping Use    Vaping status: Never Used   Substance Use Topics    Alcohol use: Not Currently     Alcohol/week: 3.0 standard drinks of alcohol    Drug use: No           10/30/2023   LAST FHS-7 RESULTS   1st degree relative breast or ovarian cancer No    No   Any relative bilateral breast cancer No    No   Any male have breast cancer No    No   Any ONE woman have  BOTH breast AND ovarian cancer No    No   Any woman with breast cancer before 50yrs No    No   2 or more relatives with breast AND/OR ovarian cancer No    No   2 or more relatives with breast AND/OR bowel cancer No    No       Multiple values from one day are sorted in reverse-chronological order        Will be due for mammogram again in March.  She had breast biopsy with Dr. Molina in the recent past that was negative.  This proved to be fibroadenoma.      History of abnormal Pap smear: No - age 65 or older with adequate negative prior screening test results (3 consecutive negative cytology results, 2 consecutive negative cotesting results, or 2 consecutive negative HrHPV test results within 10 years, with the most recent test occurring within the recommended screening interval for the test used)        Latest Ref Rng & Units 3/31/2021     8:29 AM   PAP / HPV   PAP (Historical)  NIL    HPV 16 DNA NEG^Negative Negative    HPV 18 DNA NEG^Negative Negative    Other HR HPV NEG^Negative Negative      ASCVD Risk   The 10-year ASCVD risk score (Bryant CASTELLANO, et al., 2019) is: 12.7%    Values used to calculate the score:      Age: 64 years      Sex: Female      Is Non- : No      Diabetic: No      Tobacco smoker: Yes      Systolic Blood Pressure: 138 mmHg      Is BP treated: Yes      HDL Cholesterol: 75 mg/dL      Total Cholesterol: 173 mg/dL            Reviewed and updated as needed this visit by Provider                    Past Medical History:   Diagnosis Date    Bacterial pneumonia 02/02/2010    Bitten by dog 09/2012    hospitalized    Chronic sinusitis     Chronic Sinusitis    Nonrheumatic mitral valve insufficiency 02/2005    Trace Mitral Regurgitation, Echo.  Does not require SBE    Other shoulder lesions, left shoulder     L shoulder RTC tendonitis    Personal history of other medical treatment (CODE)     prophylaxis    Personal history of other medical treatment (CODE) 03/11/2005     Echocardiogram 3/11/05 for family history of mitral valve and aortic valve replacement.  Trace mitral regurgitation.  Mitral valve appeared normal.  Tricuspid aortic valve without AI or AS.  No focal wall motion abnormalities.  Ejection fraction 72% 3/12/05.    Primary osteoarthritis of right knee     No Comments Provided    Tobacco use     quit 9/2012     Past Surgical History:   Procedure Laterality Date    ARTHROSCOPY KNEE Right 08/2001    Dr. 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    ARTHROSCOPY SHOULDER  2012    2011 / 2012,Left, supraspinatus repair    COLONOSCOPY  01/10/2013    hyperplastic, 1/10/23    COLONOSCOPY N/A 07/11/2023    1 small tubular adenoma, follow up 7/11/33    ECHOCARDIOGRAM INTRAOPERATIVE IN OR  03/11/2005    for family history of mitral valve and aortic valve replacement.  Trace mitral regurgitation.  Mitral valve appeared normal.  Tricuspid aortic valve without AI or AS.  No focal wall motion abnormalities.  Ejection fraction 72%    OTHER SURGICAL HISTORY  03/13/2006    PREMALIG/BENIGN SKIN LESION EXCISION,Excision of a benign verrucose keratosis with actinic damage and lentiginous change.    OTHER SURGICAL HISTORY  12/2006    UT SODIUM HYALONURATE PER INJECTION,Synvisc injection    RELEASE TRIGGER FINGER N/A 05/27/2022    Procedure: Right Long (Middle) Trigger Finger Release;  Surgeon: Flako Reno MD;  Location: [facility] OR    TONSILLECTOMY      No Comments Provided    TOTAL KNEE ARTHROPLASTY Right      Patient Active Problem List   Diagnosis    Elevated LFTs    HTN (hypertension)    Systemic lupus erythematosus, unspecified (H)    Nicotine use disorder    Obesity    Disorder of bursae and tendons in shoulder region    Other affections of shoulder region, not elsewhere classified    Mixed hyperlipidemia    Hyperglycemia    Pulmonary nodules    NAFLD (nonalcoholic fatty liver disease)    Prediabetes    Gastroesophageal reflux disease without esophagitis     Mild intermittent asthma without complication    Allodynia    Other complicated headache syndrome    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    ARTHROSCOPY SHOULDER  2012    2011,Left, supraspinatus repair    COLONOSCOPY  01/10/2013    hyperplastic, 1/10/23    COLONOSCOPY N/A 2023    1 small tubular adenoma, follow up 32    ECHOCARDIOGRAM INTRAOPERATIVE IN OR  2005    for family history of mitral valve and aortic valve replacement.  Trace mitral regurgitation.  Mitral valve appeared normal.  Tricuspid aortic valve without AI or AS.  No focal wall motion abnormalities.  Ejection fraction 72%    OTHER SURGICAL HISTORY  2006    PREMALIG/BENIGN SKIN LESION EXCISION,Excision of a benign verrucose keratosis with actinic damage and lentiginous change.    OTHER SURGICAL HISTORY  2006    [place] SODIUM HYALONURATE PER INJECTION,Synvisc injection    RELEASE TRIGGER FINGER N/A 2022    Procedure: Right Long (Middle) Trigger Finger Release;  Surgeon: Flako Reno MD;  Location: [facility] OR    TONSILLECTOMY      No Comments Provided    TOTAL KNEE ARTHROPLASTY Right        Social History     Tobacco Use    Smoking status: Every Day     Current packs/day: 0.00     Average packs/day: 0.8 packs/day for 45.0 years (33.8 ttl pk-yrs)     Types: Cigarettes     Start date: 10/1/1967     Last attempt to quit: 10/1/2012     Years since quittin.0    Smokeless tobacco: Never   Substance Use Topics    Alcohol use: Not Currently     Alcohol/week: 3.0 standard drinks of alcohol     Family History   Problem Relation Age of Onset    Hyperlipidemia Mother         Hyperlipidemia,Hyperlipidemia    Other - See Comments Mother         Stroke,CVA     Heart Disease Mother         Heart Disease,aortic and mitral valve  replacements, rheumatic fever    Mitral Valve Replacement Mother     Diabetes Father         Diabetes    Mitral Valve Replacement Father     Rheumatic fever Father     Diabetes Sister     Polycystic ovary syndrome Child     Thyroid Disease Child     Heart Disease Sister         Heart Disease,Valvular heart disease and diffuse athersclerosis on brain MRI    Other - See Comments Sister         rheumatic fever    Rheumatic fever Sister     Cerebrovascular Disease Sister     Other - See Comments Maternal Grandmother         Stroke,CVA    Prostate Cancer Paternal Grandfather         Cancer-prostate    Hypertension Sister     Hyperlipidemia Sister          Current Outpatient Medications   Medication Sig Dispense Refill    albuterol (PROAIR HFA/PROVENTIL HFA/VENTOLIN HFA) 108 (90 Base) MCG/ACT inhaler INHALE 2 PUFFS BY MOUTH DAILY AS NEEDED 54 g 3    atorvastatin (LIPITOR) 20 MG tablet Take 1 tablet (20 mg) by mouth at bedtime. 90 tablet 4    clobetasol (TEMOVATE) 0.05 % external ointment       fluticasone-salmeterol (ADVAIR DISKUS) 250-50 MCG/ACT inhaler TWICE DAILY Strength: 250-50 MCG/DOSE 60 each 11    losartan-hydrochlorothiazide (HYZAAR) 50-12.5 MG tablet Take 1 tablet by mouth daily. 90 tablet 4    metoprolol succinate ER (TOPROL XL) 25 MG 24 hr tablet Take 1 tablet (25 mg) by mouth daily. 90 tablet 4    omeprazole (PRILOSEC) 20 MG DR capsule I capsule daily 180 capsule 4     Allergies   Allergen Reactions    Lisinopril      cough     Current providers sharing in care for this patient include:  Patient Care Team:  Elisa Doherty NP as PCP - General (Nurse Practitioner)  Flako Reno MD as MD (Orthopaedic Surgery)  Flako Reno MD as Assigned Musculoskeletal Provider  Elisa Doherty NP as Assigned PCP  Bianca Whitney APRN CNP as Assigned Heart and Vascular Provider  Noris Molina MD as Assigned Surgical Provider    The following health maintenance items are reviewed in Epic and  "correct as of today:  Health Maintenance   Topic Date Due    DEXA  Never done    HEPATITIS B IMMUNIZATION (1 of 3 - Risk 3-dose series) Never done    RSV VACCINE (1 - Risk 60-74 years 1-dose series) Never done    ASTHMA ACTION PLAN  10/16/2021    NICOTINE/TOBACCO CESSATION COUNSELING Q 1 YR  10/17/2023    MEDICARE ANNUAL WELLNESS VISIT  01/02/2024    COVID-19 Vaccine (5 - 2024-25 season) 09/01/2024    BMP  09/18/2024    LIPID  09/18/2024    ADVANCE CARE PLANNING  09/18/2024    LUNG CANCER SCREENING  10/30/2024    ASTHMA CONTROL TEST  04/07/2025    FALL RISK ASSESSMENT  10/07/2025    MAMMO SCREENING  07/23/2026    GLUCOSE  09/18/2026    Pneumococcal Vaccine: 65+ Years (3 of 3 - PPSV23 or PCV20) 10/17/2027    DTAP/TDAP/TD IMMUNIZATION (4 - Td or Tdap) 10/17/2032    COLORECTAL CANCER SCREENING  07/11/2033    HEPATITIS C SCREENING  Completed    PHQ-2 (once per calendar year)  Completed    ZOSTER IMMUNIZATION  Completed    HEPATITIS A IMMUNIZATION  Completed    HPV IMMUNIZATION  Aged Out    MENINGITIS IMMUNIZATION  Aged Out    RSV MONOCLONAL ANTIBODY  Aged Out    HIV SCREENING  Discontinued    PAP  Discontinued    INFLUENZA VACCINE  Discontinued         Review of Systems  Constitutional, HEENT, cardiovascular, pulmonary, GI, , musculoskeletal, neuro, skin, endocrine and psych systems are negative, except as otherwise noted.     Objective    Exam  LMP  (LMP Unknown)    Estimated body mass index is 30.9 kg/m  as calculated from the following:    Height as of 7/11/24: 1.626 m (5' 4\").    Weight as of 8/20/24: 81.6 kg (180 lb).    Physical Exam  Pleasant female no acute distress.  Affect normal.  Alert and oriented x 4.  Sclera nonicteric.  Conjunctiva noninflamed.  Ear canals with small amount of cerumen, TMs clear.  Oral mucosa pink and moist.  Neck supple without adenopathy.  No thyromegaly.  No carotid bruits.  Lung fields clear to auscultation throughout.  Cardiovascular regular rate and rhythm with a 2-3 /6 systolic " ejection murmur heard throughout the precordium.  Abdomen soft and nontender.  No abdominal masses or organomegaly.  No abdominal bruits or pulsatile masses.  No axillary or inguinal adenopathy.  Extremities without edema.  DP PT intact.  Gait stable.        10/7/2024   Mini Cog   Clock Draw Score 2 Normal   3 Item Recall 3 objects recalled   Mini Cog Total Score 5            Vision Screen  Reason Vision Screen Not Completed: Patient had exam in last 12 months      Signed Electronically by: Elisa Doherty NP

## 2024-10-07 NOTE — PATIENT INSTRUCTIONS
Lung Cancer Screening   Frequently Asked Questions  If you are at high-risk for lung cancer, getting screened with low-dose computed tomography (LDCT) every year can help save your life. This handout offers answers to some of the most common questions about lung cancer screening. If you have other questions, please call [phone] ([phone]).     What is it?  Lung cancer screening uses special X-ray technology to create an image of your lung tissue. The exam is quick and easy and takes less than 10 seconds. We don t give you any medicine or use any needles. You can eat before and after the exam. You don t need to change your clothes as long as the clothing on your chest doesn t contain metal. But, you do need to be able to hold your breath for at least 6 seconds during the exam.    What is the goal of lung cancer screening?  The goal of lung cancer screening is to save lives. Many times, lung cancer is not found until a person starts having physical symptoms. Lung cancer screening can help detect lung cancer in the earliest stages when it may be easier to treat.    Who should be screened for lung cancer?  We suggest lung cancer screening for anyone who is at high-risk for lung cancer. You are in the high-risk group if you:      are between the ages of 55 and 79, and    have smoked at least 1 pack of cigarettes a day for 20 or more years, and    still smoke or have quit within the past 15 years.    However, if you have a new cough or shortness of breath, you should talk to your doctor before being screened.    Why does it matter if I have symptoms?  Certain symptoms can be a sign that you have a condition in your lungs that should be checked and treated by your doctor. These symptoms include fever, chest pain, a new or changing cough, shortness of breath that you have never felt before, coughing up blood or unexplained weight loss. Having any of these symptoms can greatly affect the results of lung  cancer screening.       Should all smokers get an LDCT lung cancer screening exam?  It depends. Lung cancer screening is for a very specific group of men and women who have a history of heavy smoking over a long period of time (see  Who should be screened for lung cancer  above).  I am in the high-risk group, but have been diagnosed with cancer in the past. Is LDCT lung cancer screening right for me?  In some cases, you should not have LDCT lung screening, such as when your doctor is already following your cancer with CT scan studies. Your doctor will help you decide if LDCT lung screening is right for you.  Do I need to have a screening exam every year?  Yes. If you are in the high-risk group described earlier, you should get an LDCT lung cancer screening exam every year until you are 79, or are no longer willing or able to undergo screening and possible procedures to diagnose and treat lung cancer.  How effective is LDCT at preventing death from lung cancer?  Studies have shown that LDCT lung cancer screening can lower the risk of death from lung cancer by 20 percent in people who are at high-risk.  What are the risks?  There are some risks and limitations of LDCT lung cancer screening. We want to make sure you understand the risks and benefits, so please let us know if you have any questions. Your doctor may want to talk with you more about these risks.    Radiation exposure: As with any exam that uses radiation, there is a very small increased risk of cancer. The amount of radiation in LDCT is small--about the same amount a person would get from a mammogram. Your doctor orders the exam when he or she feels the potential benefits outweigh the risks.    False negatives: No test is perfect, including LDCT. It is possible that you may have a medical condition, including lung cancer, that is not found during your exam. This is called a false negative result.    False positives and more testing: LDCT very often finds  something in the lung that could be cancer, but in fact is not. This is called a false positive result. False positive tests often cause anxiety. To make sure these findings are not cancer, you may need to have more tests. These tests will be done only if you give us permission. Sometimes patients need a treatment that can have side effects, such as a biopsy. For more information on false positives, see  What can I expect from the results?     Findings not related to lung cancer: Your LDCT exam also takes pictures of areas of your body next to your lungs. In a very small number of cases, the CT scan will show an abnormal finding in one of these areas, such as your kidneys, adrenal glands, liver or thyroid. This finding may not be serious, but you may need more tests. Your doctor can help you decide what other tests you may need, if any.  What can I expect from the results?  About 1 out of 4 LDCT exams will find something that may need more tests. Most of the time, these findings are lung nodules. Lung nodules are very small collections of tissue in the lung. These nodules are very common, and the vast majority--more than 97 percent--are not cancer (benign). Most are normal lymph nodes or small areas of scarring from past infections.  But, if a small lung nodule is found to be cancer, the cancer can be cured more than 90 percent of the time. To know if the nodule is cancer, we may need to get more images before your next yearly screening exam. If the nodule has suspicious features (for example, it is large, has an odd shape or grows over time), we will refer you to a specialist for further testing.  Will my doctor also get the results?  Yes. Your doctor will get a copy of your results.  Is it okay to keep smoking now that there s a cancer screening exam?  No. Tobacco is one of the strongest cancer-causing agents. It causes not only lung cancer, but other cancers and cardiovascular (heart) diseases as well. The damage  caused by smoking builds over time. This means that the longer you smoke, the higher your risk of disease. While it is never too late to quit, the sooner you quit, the better.  Where can I find help to quit smoking?  The best way to prevent lung cancer is to stop smoking. If you have already quit smoking, congratulations and keep it up! For help on quitting smoking, please call [employer] at 0-289-QUITNOW (1-449.506.5691) or the [employer] at 1-466.714.8415 to find local resources near you.  One-on-one health coaching:  If you d prefer to work individually with a health care provider on tobacco cessation, we offer:      Medication Therapy Management:  Our specially trained pharmacists work closely with you and your doctor to help you quit smoking.  Call [phone] or [phone] (toll free).

## 2024-10-11 DIAGNOSIS — J45.20 MILD INTERMITTENT ASTHMA WITHOUT COMPLICATION: ICD-10-CM

## 2024-10-16 RX ORDER — ALBUTEROL SULFATE 90 UG/1
INHALANT RESPIRATORY (INHALATION)
Qty: 54 G | Refills: 3 | OUTPATIENT
Start: 2024-10-16

## 2024-10-16 NOTE — TELEPHONE ENCOUNTER
[id number] of [place] sent Rx request for the following:      Redundant refill request refused: Too soon:  albuterol (PROAIR HFA/PROVENTIL HFA/VENTOLIN HFA) 108 (90 Base) MCG/ACT inhaler 54 g 3 10/7/2024 -- No   Sig: INHALE 2 PUFFS BY MOUTH DAILY AS NEEDED   Sent to pharmacy as: Albuterol Sulfate  (90 Base) MCG/ACT Aerosol Solution (PROAIR HFA/PROVENTIL HFA/VENTOLIN HFA)   Class: E-Prescribe   Notes to Pharmacy: Pharmacy may dispense brand covered by insurance (Proair, or proventil or ventolin or generic albuterol inhaler)   Order: [id number]   E-Prescribing Status: Receipt confirmed by pharmacy (10/7/2024  9:59 AM CDT)     Printout Tracking    External Result Report     Medication Administration Instructions    INHALE 2 PUFFS BY MOUTH DAILY AS NEEDED     Pharmacy    [id number] - [place], [place] - [address] .............. 10/16/2024  4:07 PM

## 2024-10-27 DIAGNOSIS — K21.9 GASTROESOPHAGEAL REFLUX DISEASE WITHOUT ESOPHAGITIS: ICD-10-CM

## 2024-10-30 NOTE — TELEPHONE ENCOUNTER
[id number] of [place] sent Rx request for the following:      omeprazole (PRILOSEC) 20 MG DR capsule 180 capsule 4 10/7/2024 -- No   Sig: I capsule daily   Sent to pharmacy as: Omeprazole 20 MG Oral Capsule Delayed Release (PriLOSEC)   Class: E-Prescribe   Notes to Pharmacy: Prescription renewal, patient will call for refills.   Order: [id number]   E-Prescribing Status: Receipt confirmed by pharmacy (10/7/2024  9:59 AM CDT)       Should have refills on file.  Donna Holt RN on 10/30/2024 at 11:12 AM

## 2024-11-08 ENCOUNTER — HOSPITAL ENCOUNTER (OUTPATIENT)
Dept: CT IMAGING | Facility: OTHER | Age: 64
Discharge: HOME | End: 2024-11-08
Attending: NURSE PRACTITIONER
Payer: MEDICARE

## 2024-11-08 ENCOUNTER — HOSPITAL ENCOUNTER (OUTPATIENT)
Dept: BONE DENSITY | Facility: OTHER | Age: 64
Discharge: HOME | End: 2024-11-08
Attending: NURSE PRACTITIONER
Payer: MEDICARE

## 2024-11-08 DIAGNOSIS — Z78.0 POSTMENOPAUSAL STATUS: ICD-10-CM

## 2024-11-08 DIAGNOSIS — Z87.891 PERSONAL HISTORY OF NICOTINE DEPENDENCE: ICD-10-CM

## 2024-11-08 DIAGNOSIS — Z12.2 SCREENING FOR LUNG CANCER: ICD-10-CM

## 2024-11-08 PROCEDURE — 71271 CT THORAX LUNG CANCER SCR C-: CPT

## 2024-11-08 PROCEDURE — 77080 DXA BONE DENSITY AXIAL: CPT

## 2025-04-05 ENCOUNTER — HEALTH MAINTENANCE LETTER (OUTPATIENT)
Age: 65
End: 2025-04-05

## 2025-04-08 ENCOUNTER — HOSPITAL ENCOUNTER (OUTPATIENT)
Dept: MAMMOGRAPHY | Facility: OTHER | Age: 65
Discharge: HOME | End: 2025-04-08
Attending: SURGERY | Admitting: SURGERY
Payer: MEDICARE

## 2025-04-08 DIAGNOSIS — R92.8 ABNORMAL FINDING ON BREAST IMAGING: ICD-10-CM

## 2025-04-08 DIAGNOSIS — Z09 FOLLOW-UP EXAM, 3-6 MONTHS SINCE PREVIOUS EXAM: ICD-10-CM

## 2025-04-08 PROCEDURE — 77066 DX MAMMO INCL CAD BI: CPT

## 2025-06-20 DIAGNOSIS — J45.20 MILD INTERMITTENT ASTHMA WITHOUT COMPLICATION: ICD-10-CM

## 2025-06-20 NOTE — TELEPHONE ENCOUNTER
Reason for call: Medication or medication refill    Have you contacted your pharmacy regarding this refill? Yes    If No, direct the patient to contact the Pharmacy and discontinue telephone note using Erroneous Encounter.  If Yes, continue.    Name of medication requested: ALbuterol    How many days of medication do you have left? Unknown    What pharmacy do you use? [facility]    Preferred method for responding to this message: Telephone Call    Phone number patient can be reached at: Cell number on file:    Telephone Information:   Mobile [phone]       If we cannot reach you directly, may we leave a detailed response at the number you provided? Yes    Shauna Kaufman on 6/20/2025 at 2:40 PM

## 2025-06-20 NOTE — TELEPHONE ENCOUNTER
"[facility] sent Rx request for the following:      Requested Prescriptions   Pending Prescriptions Disp Refills    albuterol (PROAIR HFA/PROVENTIL HFA/VENTOLIN HFA) 108 (90 Base) MCG/ACT inhaler 54 g 3     Sig: INHALE 2 PUFFS BY MOUTH DAILY AS NEEDED       Short-Acting Beta Agonist Inhalers Protocol  Failed - 6/20/2025  3:10 PM        Failed - Asthma control assessment score within normal limits in last 6 months     Please review ACT score.       10/17/2022 9/18/2023 10/7/2024   Asthma Control Test   ACT Total Score (Goal Greater than or Equal to 20) 18 22 18             Failed - Recent (6 month) or future (90 days) visit with the authorizing provider's specialty (provided they have been seen in the past 9 months)     Patient had office visit in the last 6 months or has a visit in the next 30 days with authorizing provider or within the authorizing provider's specialty.  See \"Patient Info\" tab in inbasket, or \"Choose Columns\" in Meds & Orders section of the refill encounter.         Last Prescription Date:   10/7/24  Last Fill Qty/Refills:         54g, R-3    Last Office Visit:              10/7/24   Future Office visit:           none     Routing refill request to provider for review/approval.     Elizabeth Maxwell RN on 6/20/2025 at 3:12 PM           "

## 2025-06-22 RX ORDER — ALBUTEROL SULFATE 90 UG/1
INHALANT RESPIRATORY (INHALATION)
Qty: 54 G | Refills: 3 | Status: SHIPPED | OUTPATIENT
Start: 2025-06-22

## 2025-07-09 ENCOUNTER — OFFICE VISIT (OUTPATIENT)
Dept: CARDIOLOGY | Facility: OTHER | Age: 65
End: 2025-07-09
Attending: NURSE PRACTITIONER
Payer: MEDICARE

## 2025-07-09 VITALS
HEART RATE: 72 BPM | TEMPERATURE: 97.3 F | OXYGEN SATURATION: 96 % | SYSTOLIC BLOOD PRESSURE: 116 MMHG | DIASTOLIC BLOOD PRESSURE: 66 MMHG | BODY MASS INDEX: 28.42 KG/M2 | RESPIRATION RATE: 16 BRPM | WEIGHT: 166.5 LBS | HEIGHT: 64 IN

## 2025-07-09 DIAGNOSIS — E78.2 MIXED HYPERLIPIDEMIA: ICD-10-CM

## 2025-07-09 DIAGNOSIS — I51.89 GRADE I DIASTOLIC DYSFUNCTION: ICD-10-CM

## 2025-07-09 DIAGNOSIS — I51.89 HYPERKINETIC HEART DISEASE: Primary | ICD-10-CM

## 2025-07-09 DIAGNOSIS — R00.0 SINUS TACHYCARDIA: ICD-10-CM

## 2025-07-09 DIAGNOSIS — I10 ESSENTIAL HYPERTENSION: ICD-10-CM

## 2025-07-09 DIAGNOSIS — R73.03 PREDIABETES: ICD-10-CM

## 2025-07-09 PROCEDURE — G0463 HOSPITAL OUTPT CLINIC VISIT: HCPCS

## 2025-07-09 ASSESSMENT — PAIN SCALES - GENERAL: PAINLEVEL_OUTOF10: NO PAIN (0)

## 2025-07-09 NOTE — NURSING NOTE
"Chief Complaint   Patient presents with    Yearly Exam     Cardiac Care visit     Hypertension     Essential        Initial /66 (BP Location: Right arm, Patient Position: Sitting, Cuff Size: Adult Regular)   Pulse 72   Temp 97.3  F (36.3  C) (Temporal)   Resp 16   Ht 1.626 m (5' 4\")   Wt 75.5 kg (166 lb 8 oz)   LMP  (LMP Unknown)   SpO2 96%   BMI 28.58 kg/m   Estimated body mass index is 28.58 kg/m  as calculated from the following:    Height as of this encounter: 1.626 m (5' 4\").    Weight as of this encounter: 75.5 kg (166 lb 8 oz).  Meds Reconciled: complete  Pt is not on Aspirin  Pt is on a Statin  Pt is not on Xarelto or Eliquis  Pt is not on a Warfarin   PHQ and/or SARAH reviewed. Pt referred to PCP/MH Provider as appropriate.    Agatha Duque LPN         "

## 2025-07-09 NOTE — PATIENT INSTRUCTIONS
No medication changes at this time.  Repeat echocardiogram in June 2026.  Follow-up with cardiology in one year, echocardiogram to be completed prior.

## 2025-07-10 LAB
ATRIAL RATE - MUSE: 73 BPM
DIASTOLIC BLOOD PRESSURE - MUSE: NORMAL MMHG
INTERPRETATION ECG - MUSE: NORMAL
P AXIS - MUSE: 56 DEGREES
PR INTERVAL - MUSE: 130 MS
QRS DURATION - MUSE: 92 MS
QT - MUSE: 376 MS
QTC - MUSE: 414 MS
R AXIS - MUSE: 61 DEGREES
SYSTOLIC BLOOD PRESSURE - MUSE: NORMAL MMHG
T AXIS - MUSE: 39 DEGREES
VENTRICULAR RATE- MUSE: 73 BPM

## 2025-07-29 DIAGNOSIS — I10 PRIMARY HYPERTENSION: ICD-10-CM

## 2025-07-31 RX ORDER — LOSARTAN POTASSIUM AND HYDROCHLOROTHIAZIDE 12.5; 5 MG/1; MG/1
1 TABLET ORAL DAILY
Qty: 90 TABLET | Refills: 0 | OUTPATIENT
Start: 2025-07-31

## 2025-07-31 NOTE — TELEPHONE ENCOUNTER
Redundant refill request refused: Too soon:   Disp Refills Start End MIKE   losartan-hydrochlorothiazide (HYZAAR) 50-12.5 MG tablet 90 tablet 4 10/7/2024 -- No   Sig - Route: Take 1 tablet by mouth daily. - Oral   Sent to pharmacy as: Losartan Potassium-HCTZ 50-12.5 MG Oral Tablet (HYZAAR)   Class: E-Prescribe   Notes to Pharmacy: Prescription renewal, patient will call for refills.   Order: [id number]   E-Prescribing Status: Receipt confirmed by pharmacy (10/7/2024  9:59 AM CDT)     Printout Tracking    External Result Report     Pharmacy    [id number] - [place], [place] - [address]       Unable to complete prescription refill per RN Medication Refill Policy.     Lowell Mayberry RN on 7/31/2025 at 5:47 PM

## 2025-08-04 DIAGNOSIS — I10 PRIMARY HYPERTENSION: ICD-10-CM

## 2025-08-04 RX ORDER — LOSARTAN POTASSIUM AND HYDROCHLOROTHIAZIDE 12.5; 5 MG/1; MG/1
1 TABLET ORAL DAILY
Qty: 90 TABLET | Refills: 0 | Status: SHIPPED | OUTPATIENT
Start: 2025-08-04

## (undated) DEVICE — ENDO TRAP POLYP E-TRAP 00711099

## (undated) DEVICE — ENDO KIT COMPLIANCE DYKENDOCMPLY

## (undated) DEVICE — PACK MAJOR EXTREMITY SOP15MEFCA

## (undated) DEVICE — ENDO SNARE EXACTO COLD 9MM LOOP 2.4MMX230CM 00711115

## (undated) DEVICE — DRSG GAUZE 4X4" TRAY 6939

## (undated) DEVICE — SU ETHILON 4-0 FS-2 18" 662H

## (undated) DEVICE — DRSG XEROFORM 1X8"

## (undated) DEVICE — TOURNIQUET SGL BLADDER 18"X4" RED 5921-218-135

## (undated) DEVICE — CAST PADDING-STERILE 2"

## (undated) DEVICE — SOL WATER 1500ML

## (undated) DEVICE — ESU GROUND PAD ADULT W/CORD E7507

## (undated) DEVICE — ENDO FORCEP ENDOJAW BIOPSY 2.8MMX230CM FB-220U

## (undated) DEVICE — DRAPE STERI TOWEL LG 1010

## (undated) DEVICE — GLOVE SENSICARE 8.5 MSG1085 LATEX FREE

## (undated) DEVICE — TUBING SUCTION 10'X3/16" N510

## (undated) DEVICE — COVER LIGHT HANDLE LT-F02

## (undated) DEVICE — SUCTION MANIFOLD NEPTUNE 2 SYS 4 PORT 0702-020-000

## (undated) DEVICE — ENDO BRUSH CHANNEL MASTER CLEANING 2-4.2MM BW-412T

## (undated) DEVICE — PREP CHLORAPREP 26ML TINTED ORANGE  260815

## (undated) RX ORDER — METHYLPREDNISOLONE ACETATE 40 MG/ML
INJECTION, SUSPENSION INTRA-ARTICULAR; INTRALESIONAL; INTRAMUSCULAR; SOFT TISSUE
Status: DISPENSED
Start: 2022-05-12

## (undated) RX ORDER — LIDOCAINE HYDROCHLORIDE 10 MG/ML
INJECTION, SOLUTION INFILTRATION; PERINEURAL
Status: DISPENSED
Start: 2022-05-27

## (undated) RX ORDER — BUPIVACAINE HYDROCHLORIDE 2.5 MG/ML
INJECTION, SOLUTION EPIDURAL; INFILTRATION; INTRACAUDAL
Status: DISPENSED
Start: 2022-05-27

## (undated) RX ORDER — PROPOFOL 10 MG/ML
INJECTION, EMULSION INTRAVENOUS
Status: DISPENSED
Start: 2023-07-11

## (undated) RX ORDER — BUPIVACAINE HYDROCHLORIDE 5 MG/ML
INJECTION, SOLUTION EPIDURAL; INTRACAUDAL
Status: DISPENSED
Start: 2022-11-04

## (undated) RX ORDER — ONDANSETRON 2 MG/ML
INJECTION INTRAMUSCULAR; INTRAVENOUS
Status: DISPENSED
Start: 2022-05-27

## (undated) RX ORDER — BUPIVACAINE HYDROCHLORIDE 5 MG/ML
INJECTION, SOLUTION EPIDURAL; INTRACAUDAL
Status: DISPENSED
Start: 2022-05-12

## (undated) RX ORDER — BUPIVACAINE HYDROCHLORIDE 5 MG/ML
INJECTION, SOLUTION EPIDURAL; INTRACAUDAL
Status: DISPENSED
Start: 2023-08-18

## (undated) RX ORDER — REGADENOSON 0.08 MG/ML
INJECTION, SOLUTION INTRAVENOUS
Status: DISPENSED
Start: 2024-07-11

## (undated) RX ORDER — FENTANYL CITRATE 50 UG/ML
INJECTION, SOLUTION INTRAMUSCULAR; INTRAVENOUS
Status: DISPENSED
Start: 2022-05-27

## (undated) RX ORDER — METHYLPREDNISOLONE ACETATE 40 MG/ML
INJECTION, SUSPENSION INTRA-ARTICULAR; INTRALESIONAL; INTRAMUSCULAR; SOFT TISSUE
Status: DISPENSED
Start: 2022-11-04

## (undated) RX ORDER — METHYLPREDNISOLONE ACETATE 40 MG/ML
INJECTION, SUSPENSION INTRA-ARTICULAR; INTRALESIONAL; INTRAMUSCULAR; SOFT TISSUE
Status: DISPENSED
Start: 2023-08-18

## (undated) RX ORDER — LIDOCAINE HYDROCHLORIDE 20 MG/ML
INJECTION, SOLUTION EPIDURAL; INFILTRATION; INTRACAUDAL; PERINEURAL
Status: DISPENSED
Start: 2022-05-27

## (undated) RX ORDER — PROPOFOL 10 MG/ML
INJECTION, EMULSION INTRAVENOUS
Status: DISPENSED
Start: 2022-05-27